# Patient Record
Sex: MALE | Race: BLACK OR AFRICAN AMERICAN | NOT HISPANIC OR LATINO | Employment: OTHER | URBAN - METROPOLITAN AREA
[De-identification: names, ages, dates, MRNs, and addresses within clinical notes are randomized per-mention and may not be internally consistent; named-entity substitution may affect disease eponyms.]

---

## 2017-06-22 ENCOUNTER — HOSPITAL ENCOUNTER (EMERGENCY)
Facility: HOSPITAL | Age: 47
Discharge: HOME | End: 2017-06-22
Attending: EMERGENCY MEDICINE

## 2017-06-22 VITALS
RESPIRATION RATE: 20 BRPM | BODY MASS INDEX: 22.53 KG/M2 | HEIGHT: 76 IN | HEART RATE: 83 BPM | DIASTOLIC BLOOD PRESSURE: 74 MMHG | SYSTOLIC BLOOD PRESSURE: 135 MMHG | OXYGEN SATURATION: 100 % | WEIGHT: 185 LBS | TEMPERATURE: 97 F

## 2017-06-22 DIAGNOSIS — R55 SYNCOPE: ICD-10-CM

## 2017-06-22 DIAGNOSIS — E87.1 HYPONATREMIA: Primary | ICD-10-CM

## 2017-06-22 DIAGNOSIS — R41.0 CONFUSION: ICD-10-CM

## 2017-06-22 LAB
ALBUMIN SERPL BCP-MCNC: 3.3 G/DL
ALP SERPL-CCNC: 98 U/L
ALT SERPL W/O P-5'-P-CCNC: 54 U/L
AMPHET+METHAMPHET UR QL: NEGATIVE
ANION GAP SERPL CALC-SCNC: 11 MMOL/L
AST SERPL-CCNC: 92 U/L
BARBITURATES UR QL SCN>200 NG/ML: NEGATIVE
BASOPHILS # BLD AUTO: 0 K/UL
BASOPHILS NFR BLD: 0.5 %
BENZODIAZ UR QL SCN>200 NG/ML: NEGATIVE
BILIRUB SERPL-MCNC: 0.8 MG/DL
BUN SERPL-MCNC: 2 MG/DL
BZE UR QL SCN: NEGATIVE
CALCIUM SERPL-MCNC: 8 MG/DL
CANNABINOIDS UR QL SCN: ABNORMAL
CHLORIDE SERPL-SCNC: 85 MMOL/L
CO2 SERPL-SCNC: 23 MMOL/L
CREAT SERPL-MCNC: 0.7 MG/DL
CREAT UR-MCNC: 20.4 MG/DL
D DIMER PPP IA.FEU-MCNC: 0.26 MG/L FEU
DIFFERENTIAL METHOD: ABNORMAL
EOSINOPHIL # BLD AUTO: 0 K/UL
EOSINOPHIL NFR BLD: 0.4 %
ERYTHROCYTE [DISTWIDTH] IN BLOOD BY AUTOMATED COUNT: 12.6 %
EST. GFR  (AFRICAN AMERICAN): >60 ML/MIN/1.73 M^2
EST. GFR  (NON AFRICAN AMERICAN): >60 ML/MIN/1.73 M^2
GLUCOSE SERPL-MCNC: 117 MG/DL
HCT VFR BLD AUTO: 34.1 %
HGB BLD-MCNC: 11.8 G/DL
LYMPHOCYTES # BLD AUTO: 0.7 K/UL
LYMPHOCYTES NFR BLD: 19.4 %
MAGNESIUM SERPL-MCNC: 1.5 MG/DL
MCH RBC QN AUTO: 35.8 PG
MCHC RBC AUTO-ENTMCNC: 34.7 %
MCV RBC AUTO: 103 FL
METHADONE UR QL SCN>300 NG/ML: NEGATIVE
MONOCYTES # BLD AUTO: 0.4 K/UL
MONOCYTES NFR BLD: 11.4 %
NEUTROPHILS # BLD AUTO: 2.6 K/UL
NEUTROPHILS NFR BLD: 68.3 %
OPIATES UR QL SCN: NEGATIVE
PCP UR QL SCN>25 NG/ML: NEGATIVE
PLATELET # BLD AUTO: 113 K/UL
PMV BLD AUTO: 7.4 FL
POTASSIUM SERPL-SCNC: 3.6 MMOL/L
PROT SERPL-MCNC: 5.7 G/DL
RBC # BLD AUTO: 3.31 M/UL
SODIUM SERPL-SCNC: 119 MMOL/L
SODIUM SERPL-SCNC: 122 MMOL/L
TOXICOLOGY INFORMATION: ABNORMAL
TROPONIN I SERPL DL<=0.01 NG/ML-MCNC: 0.03 NG/ML
WBC # BLD AUTO: 3.8 K/UL

## 2017-06-22 PROCEDURE — 80053 COMPREHEN METABOLIC PANEL: CPT

## 2017-06-22 PROCEDURE — 25000003 PHARM REV CODE 250: Performed by: EMERGENCY MEDICINE

## 2017-06-22 PROCEDURE — 84484 ASSAY OF TROPONIN QUANT: CPT

## 2017-06-22 PROCEDURE — 83735 ASSAY OF MAGNESIUM: CPT

## 2017-06-22 PROCEDURE — 99285 EMERGENCY DEPT VISIT HI MDM: CPT | Mod: 25

## 2017-06-22 PROCEDURE — 36415 COLL VENOUS BLD VENIPUNCTURE: CPT

## 2017-06-22 PROCEDURE — 96361 HYDRATE IV INFUSION ADD-ON: CPT

## 2017-06-22 PROCEDURE — 93005 ELECTROCARDIOGRAM TRACING: CPT

## 2017-06-22 PROCEDURE — 85025 COMPLETE CBC W/AUTO DIFF WBC: CPT

## 2017-06-22 PROCEDURE — 84295 ASSAY OF SERUM SODIUM: CPT

## 2017-06-22 PROCEDURE — 85379 FIBRIN DEGRADATION QUANT: CPT

## 2017-06-22 PROCEDURE — 80307 DRUG TEST PRSMV CHEM ANLYZR: CPT

## 2017-06-22 PROCEDURE — 96360 HYDRATION IV INFUSION INIT: CPT

## 2017-06-22 RX ORDER — SODIUM CHLORIDE 9 MG/ML
1000 INJECTION, SOLUTION INTRAVENOUS
Status: COMPLETED | OUTPATIENT
Start: 2017-06-22 | End: 2017-06-22

## 2017-06-22 RX ADMIN — SODIUM CHLORIDE 1000 ML: 0.9 INJECTION, SOLUTION INTRAVENOUS at 07:06

## 2017-06-22 RX ADMIN — SODIUM CHLORIDE 1000 ML: 0.9 INJECTION, SOLUTION INTRAVENOUS at 04:06

## 2017-06-22 NOTE — ED PROVIDER NOTES
Encounter Date: 6/22/2017    SCRIBE #1 NOTE: I Briasophia Guidry, am scribing for, and in the presence of, Dr. Hernandez.       History     Chief Complaint   Patient presents with    Loss of Consciousness     Found unconscious while in shower [place] the [employer].  Currently confused.  Hematoma to R forehead, bruising to right lower lip.       6/22/2017  4:04 PM     Chief Complaint: LOC    The patient is a 46 y.o. male who presents with loss of consciousness. EMS reports the patient was found unconscious while in the shower [place] the [employer]. Pt has swelling to the upper and lower lip. Patient reports he was cleaning and picking up towels before passing out. He is unaware why he passed out. He was feeling fine prior to the event. He denies any headache. He complains of constant, mild shortness of breath with palpations since passing out. No chest pain. Denies any recent fever, chills, abdominal pain, nausea, vomiting or diarrhea. Pt states he drunk some beer yesterday evening but denies any drug usage. No PMHx. No shx.      The history is provided by the patient and the EMS personnel.     Review of patient's allergies indicates:  Allergies not on file  No past medical history on file.  No past surgical history on file.  No family history on file.  Social History   Substance Use Topics    Smoking status: Not on file    Smokeless tobacco: Not on file    Alcohol use Not on file     Review of Systems   Constitutional: Negative for appetite change, chills and fever.   HENT: Negative for congestion, rhinorrhea and sore throat.         + Upper and lower lip swelling.   Respiratory: Positive for shortness of breath. Negative for cough.    Cardiovascular: Positive for palpitations. Negative for chest pain.   Gastrointestinal: Negative for abdominal pain, diarrhea, nausea and vomiting.   Genitourinary: Negative for dysuria.   Musculoskeletal: Negative for back pain and myalgias.   Skin: Negative for rash.   Neurological:  Positive for syncope. Negative for weakness, numbness and headaches.   Hematological: Does not bruise/bleed easily.   All other systems reviewed and are negative.      Physical Exam     Initial Vitals   BP Pulse Resp Temp SpO2   -- -- -- -- --      MAP       --         Physical Exam    Nursing note and vitals reviewed.  Constitutional: He appears well-developed and well-nourished. No distress.   HENT:   Head: Normocephalic and atraumatic.   Mouth/Throat: Uvula is midline, oropharynx is clear and moist and mucous membranes are normal. No lacerations.   Swelling to the upper and lower lip. No evidence of dental injury.   Eyes: EOM are normal. Pupils are equal, round, and reactive to light.   Neck: Normal range of motion.   Cardiovascular: Normal rate, regular rhythm, normal heart sounds and intact distal pulses. Exam reveals no gallop and no friction rub.    No murmur heard.  Pulmonary/Chest: Breath sounds normal. He has no wheezes. He has no rhonchi. He has no rales.   Abdominal: Soft. He exhibits no distension. There is no tenderness.   Musculoskeletal: Normal range of motion. He exhibits no edema.   Neurological: He is alert and oriented to person, place, and time.   Skin: Skin is dry. No rash noted.   Psychiatric: He has a normal mood and affect.         ED Course   Procedures  Labs Reviewed - No data to display  EKG Readings: (Independently Interpreted)   Rhythm: Normal Sinus Rhythm. Heart Rate: 83. Ectopy: No Ectopy. Conduction: Normal. ST Segments: Normal ST Segments. T Waves: Normal. Axis: Normal. Clinical Impression: Normal Sinus Rhythm          Medical Decision Making:   Clinical Tests:   Lab Tests: Ordered and Reviewed  The following lab test(s) were unremarkable: CBC, CMP, Troponin and D-Dimer  ED Management:  Josh Coulter is a 46 y.o. male who presents with  syncopal episode with contusion to the lip.  Circumstances leading up to the loss of consciousness remain unclear.  He initially denies any symptoms  prior to syncopal episode; however, prior to discharge she reports that he has had a 2 day history of nausea vomiting and diarrhea.  He also states he was working in very warm conditions making hyponatremia most likely hypovolemic hyponatremia.  The severity of the hyponatremia warrants admission.  He is strongly encouraged to seek admission for hydration with serial sodiums and evaluation of the etiology.  He refuses admission and understands that worsening of his hyponatremia may result in  seizures, brain damage or even death.            Scribe Attestation:   Scribe #1: I performed the above scribed service and the documentation accurately describes the services I performed. I attest to the accuracy of the note.    Attending Attestation:           Physician Attestation for Scribe:  Physician Attestation Statement for Scribe #1: I, Dr. Hernandez, reviewed documentation, as scribed by Brai Guidry in my presence, and it is both accurate and complete.                 ED Course     Clinical Impression:   The primary encounter diagnosis was Hyponatremia. Diagnoses of Syncope and Confusion were also pertinent to this visit.                           Guerrero Hernandez III, MD  06/22/17 9740

## 2019-11-22 ENCOUNTER — OFFICE VISIT (OUTPATIENT)
Dept: FAMILY MEDICINE | Facility: CLINIC | Age: 49
End: 2019-11-22
Payer: MEDICAID

## 2019-11-22 VITALS
OXYGEN SATURATION: 99 % | HEIGHT: 76 IN | TEMPERATURE: 98 F | DIASTOLIC BLOOD PRESSURE: 82 MMHG | WEIGHT: 163.19 LBS | HEART RATE: 103 BPM | SYSTOLIC BLOOD PRESSURE: 142 MMHG | BODY MASS INDEX: 19.87 KG/M2

## 2019-11-22 DIAGNOSIS — Z86.2 HISTORY OF ANEMIA: ICD-10-CM

## 2019-11-22 DIAGNOSIS — N52.9 ERECTILE DYSFUNCTION, UNSPECIFIED ERECTILE DYSFUNCTION TYPE: ICD-10-CM

## 2019-11-22 DIAGNOSIS — Z23 NEED FOR INFLUENZA VACCINATION: ICD-10-CM

## 2019-11-22 DIAGNOSIS — M43.16 SPONDYLOLISTHESIS OF LUMBAR REGION: Primary | ICD-10-CM

## 2019-11-22 DIAGNOSIS — Z23 NEED FOR TDAP VACCINATION: ICD-10-CM

## 2019-11-22 DIAGNOSIS — Z13.220 ENCOUNTER FOR LIPID SCREENING FOR CARDIOVASCULAR DISEASE: ICD-10-CM

## 2019-11-22 DIAGNOSIS — Z13.6 ENCOUNTER FOR LIPID SCREENING FOR CARDIOVASCULAR DISEASE: ICD-10-CM

## 2019-11-22 DIAGNOSIS — R79.89 ABNORMAL LFTS: ICD-10-CM

## 2019-11-22 DIAGNOSIS — Z12.11 ENCOUNTER FOR SCREENING COLONOSCOPY: ICD-10-CM

## 2019-11-22 DIAGNOSIS — H61.20 IMPACTED CERUMEN, UNSPECIFIED LATERALITY: ICD-10-CM

## 2019-11-22 PROCEDURE — 90472 IMMUNIZATION ADMIN EACH ADD: CPT | Mod: PBBFAC | Performed by: NURSE PRACTITIONER

## 2019-11-22 PROCEDURE — 99205 OFFICE O/P NEW HI 60 MIN: CPT | Performed by: NURSE PRACTITIONER

## 2019-11-22 PROCEDURE — 99203 PR OFFICE/OUTPT VISIT, NEW, LEVL III, 30-44 MIN: ICD-10-PCS | Mod: S$PBB,,, | Performed by: NURSE PRACTITIONER

## 2019-11-22 PROCEDURE — 99203 OFFICE O/P NEW LOW 30 MIN: CPT | Mod: S$PBB,,, | Performed by: NURSE PRACTITIONER

## 2019-11-22 PROCEDURE — 90715 TDAP VACCINE 7 YRS/> IM: CPT | Mod: PBBFAC | Performed by: NURSE PRACTITIONER

## 2019-11-22 PROCEDURE — 90471 IMMUNIZATION ADMIN: CPT | Mod: PBBFAC | Performed by: NURSE PRACTITIONER

## 2019-11-22 RX ORDER — SILDENAFIL CITRATE 20 MG/1
20 TABLET ORAL DAILY PRN
Qty: 30 TABLET | Refills: 1 | Status: SHIPPED | OUTPATIENT
Start: 2019-11-22 | End: 2021-03-22 | Stop reason: DRUGHIGH

## 2019-11-22 RX ORDER — CYCLOBENZAPRINE HCL 5 MG
10 TABLET ORAL 3 TIMES DAILY PRN
Qty: 30 TABLET | Refills: 0 | Status: SHIPPED | OUTPATIENT
Start: 2019-11-22 | End: 2020-05-12 | Stop reason: SDUPTHER
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
## 2019-12-04 ENCOUNTER — OFFICE VISIT (OUTPATIENT)
Dept: ORTHOPEDICS | Facility: CLINIC | Age: 49
End: 2019-12-04
Payer: MEDICARE

## 2019-12-04 VITALS — BODY MASS INDEX: 20.08 KG/M2 | DIASTOLIC BLOOD PRESSURE: 90 MMHG | WEIGHT: 165 LBS | SYSTOLIC BLOOD PRESSURE: 140 MMHG

## 2019-12-04 DIAGNOSIS — M43.16 SPONDYLOLISTHESIS AT L4-L5 LEVEL: ICD-10-CM

## 2019-12-04 DIAGNOSIS — Z98.1 HISTORY OF LUMBAR FUSION: ICD-10-CM

## 2019-12-04 PROCEDURE — 99203 OFFICE O/P NEW LOW 30 MIN: CPT | Mod: 25,S$GLB,, | Performed by: ORTHOPAEDIC SURGERY

## 2019-12-04 PROCEDURE — 99203 PR OFFICE/OUTPT VISIT, NEW, LEVL III, 30-44 MIN: ICD-10-PCS | Mod: 25,S$GLB,, | Performed by: ORTHOPAEDIC SURGERY

## 2019-12-04 RX ORDER — HYDROCODONE BITARTRATE AND ACETAMINOPHEN 7.5; 325 MG/1; MG/1
1 TABLET ORAL EVERY 6 HOURS PRN
Qty: 28 TABLET | Refills: 0 | Status: SHIPPED | OUTPATIENT
Start: 2019-12-04 | End: 2019-12-11

## 2019-12-04 NOTE — PROGRESS NOTES
Subjective:       Patient ID: Josh Coulter is a 49 y.o. male.    Chief Complaint: Pain of the Lumbar Spine (Lumbar pain x 9 years. He had surgery 2010. Pain radiates down both legs to feet with numbness, tingling and burning. No other treatment)      History of Present Illness  This man had lumbar surgery in 2010 and was improved or not pain free until 2017 and 2017 he fell and had a worsening of his symptoms he saw a doctor who told him that he damaged the disc both above and below his previous fusion.  Fairly previous had a fusion in complaints now of constant pain that radiates down both legs and feet on a daily basis worse with activity.  No bowel or bladder incontinence.  His right leg bothers him more than the left leg.    Current Medications  Current Outpatient Medications   Medication Sig Dispense Refill    cyclobenzaprine (FLEXERIL) 5 MG tablet Take 2 tablets (10 mg total) by mouth 3 (three) times daily as needed for Muscle spasms. 30 tablet 0    sildenafil (REVATIO) 20 mg Tab Take 1 tablet (20 mg total) by mouth daily as needed (erectile dysfunction). 30 tablet 1    COMPRESSION SOCKS, LARGE MISC Please use to help with your leg swelling.       No current facility-administered medications for this visit.        Allergies  Review of patient's allergies indicates:  No Known Allergies    Past Medical History  History reviewed. No pertinent past medical history.    Surgical History  Past Surgical History:   Procedure Laterality Date    HERNIA REPAIR      SPINE SURGERY  2009       Family History:   Family History   Problem Relation Age of Onset    Hyperlipidemia Mother     Hypertension Mother     Hyperlipidemia Father     Hypertension Father        Social History:   Social History     Socioeconomic History    Marital status:      Spouse name: Not on file    Number of children: 3    Years of education: Not on file    Highest education level: Not on file   Occupational History    Occupation:  unemployed   Social Needs    Financial resource strain: Not on file    Food insecurity:     Worry: Not on file     Inability: Not on file    Transportation needs:     Medical: Not on file     Non-medical: Not on file   Tobacco Use    Smoking status: Current Every Day Smoker     Packs/day: 1.00     Years: 19.00     Pack years: 19.00     Types: Cigarettes    Smokeless tobacco: Never Used   Substance and Sexual Activity    Alcohol use: Yes     Alcohol/week: 14.0 standard drinks     Types: 14 Cans of beer per week     Comment: 2 beers per night    Drug use: Yes     Types: Marijuana    Sexual activity: Yes     Partners: Female   Lifestyle    Physical activity:     Days per week: Not on file     Minutes per session: Not on file    Stress: Very much   Relationships    Social connections:     Talks on phone: Not on file     Gets together: Not on file     Attends Anabaptism service: Not on file     Active member of club or organization: Not on file     Attends meetings of clubs or organizations: Not on file     Relationship status: Not on file   Other Topics Concern    Not on file   Social History Narrative    Not on file       Hospitalization/Major Diagnostic Procedure:     Review of Systems     General/Constitutional:  Chills denies. Fatigue denies. Fever denies. Weight gain denies. Weight loss denies.    Respiratory:  Shortness of breath denies.    Cardiovascular:  Chest pain denies.    Gastrointestinal:  Constipation denies. Diarrhea denies. Nausea denies. Vomiting denies.     Hematology:  Easy bruising denies. Prolonged bleeding denies.     Genitourinary:  Frequent urination denies. Pain in lower back denies. Painful urination denies.     Musculoskeletal:  See HPI for details    Skin:  Rash denies.    Neurologic:  Dizziness denies. Gait abnormalities denies. Seizures denies. Tingling/Numbess denies.    Psychiatric:  Anxiety denies. Depressed mood denies.     Objective:   Vital Signs:   Vitals:    12/04/19  1330   BP: (!) 140/90        Physical Exam      General Examination:     Constitutional: The patient is alert and oriented to lace person and time. Mood is pleasant.     Head/Face: Normal facial features normal eyebrows    Eyes: Normal extraocular motion bilaterally    Lungs: Respirations are equal and unlabored    Gait is coordinated.    Cardiovascular: There are no swelling or varicosities present.    Lymphatic: Negative for adenopathy    Skin: Normal    Neurological: Level of consciousness normal. Oriented to place person and time and situation    Psychiatric: Oriented to time place person and situation    Lumbar exam shows well-healed lumbar incision L3-S1 bilateral spinous muscle spasm no active extension marked room restriction of flexion and bending of the trunk.  Patient unable to stand erect Straight leg raising positive right side patellar Achilles reflexes symmetrical the knee range of motion normal subjective numbness in the  Nerve root distribution.  No edema adenopathy or varicosities noted.  XRAY Report/ Interpretation :  AP pelvis x-ray was taken today. Indications low back pain and/or hip pain. Findings AP pelvis x-ray appears to be normal with no evidence of fractures or significant degenerative disease AP and lateral x-rays of lumbar spine will performed today. Indications low back pain. Findings:  AP and flexion-extension lateral x-rays of the lumbar spine were taken.  There is an instrumented lumbar fusion at L5-S1.  Flexion-extension lateral x-rays showed marked narrowing and spondylolisthesis at L4-5 flexion-extension view shows some translational movement at that level      Assessment:       1. Adjacent segment disease with spinal stenosis    2. History of lumbar fusion    3. Spondylolisthesis at L4-L5 level        Plan:       Josh was seen today for pain.    Diagnoses and all orders for this visit:    Adjacent segment disease with spinal stenosis  -     X-Ray Lumbar Spine Flexion And  Extension Only    History of lumbar fusion  -     X-Ray Lumbar Spine Ap And Lateral  -     X-Ray Pelvis Routine AP  -     X-Ray Lumbar Spine Flexion And Extension Only    Spondylolisthesis at L4-L5 level         No follow-ups on file.    Treatment options were discussed regards to the nature of the spinal condition conservative pain interventional and surgical options were discussed in detail and the probability of success of the separate treatment options was discussed in detail the patient expressed a clear understanding of the treatment options would regards to surgery the procedure risks benefits complications and outcomes were discussed.  No guarantees were given regards to surgical outcome.  Lumbar MRI with and without gadolinium advised at this time    This note was created using Dragon voice recognition software that occasionally misinterpreted phrases or words.

## 2019-12-16 ENCOUNTER — TELEPHONE (OUTPATIENT)
Dept: ORTHOPEDICS | Facility: CLINIC | Age: 49
End: 2019-12-16

## 2019-12-16 DIAGNOSIS — Z98.1 HISTORY OF LUMBAR FUSION: ICD-10-CM

## 2019-12-16 DIAGNOSIS — M43.16 SPONDYLOLISTHESIS AT L4-L5 LEVEL: ICD-10-CM

## 2019-12-16 NOTE — TELEPHONE ENCOUNTER
----- Message from Zaria Gonsalez sent at 12/16/2019 11:39 AM CST -----  Patients Lumbar MRI was denied.  He wants to be set up for 6w of PT.

## 2019-12-20 ENCOUNTER — CLINICAL SUPPORT (OUTPATIENT)
Dept: REHABILITATION | Facility: HOSPITAL | Age: 49
End: 2019-12-20
Attending: ORTHOPAEDIC SURGERY
Payer: MEDICARE

## 2019-12-20 DIAGNOSIS — Z98.1 HISTORY OF LUMBAR FUSION: ICD-10-CM

## 2019-12-20 DIAGNOSIS — R26.9 GAIT ABNORMALITY: ICD-10-CM

## 2019-12-20 PROCEDURE — 97161 PT EVAL LOW COMPLEX 20 MIN: CPT | Mod: PN

## 2019-12-20 RX ORDER — HYDROCODONE BITARTRATE AND ACETAMINOPHEN 7.5; 325 MG/1; MG/1
1 TABLET ORAL EVERY 6 HOURS PRN
Qty: 28 TABLET | Refills: 0 | Status: SHIPPED | OUTPATIENT
Start: 2019-12-20 | End: 2019-12-27

## 2019-12-20 NOTE — TELEPHONE ENCOUNTER
----- Message from Radha Rendon sent at 12/19/2019 10:27 AM CST -----  Contact: patient  Patient was calling to get a refill on pain medication Hydrocodone 7.5-325 mg, about to start therapy, call patient back at [phone].

## 2019-12-20 NOTE — PLAN OF CARE
"[facility]  Physical Therapy Initial Evaluation    Name: Josh Coulter  Clinic Number: [id number]    Therapy Diagnosis:   Encounter Diagnosis   Name Primary?    Gait abnormality      Physician: Cholo Mena MD    Physician Orders: PT Eval and Treat   Medical Diagnosis from Referral:   M48.00 (ICD-10-CM) - Adjacent segment disease with spinal stenosis   Z98.1 (ICD-10-CM) - History of lumbar fusion   M43.16 (ICD-10-CM) - Spondylolisthesis at L4-L5 level       Evaluation Date: 12/20/2019  Authorization Period Expiration: 12/15/2020  Plan of Care Expiration: 2/14/2020  Visit # / Visits authorized: 1/ 1    Time In: 1410  Time Out: 1500  Total Billable Time: 50 minutes    Precautions: Falls, standard    Subjective   Date of onset: 2010  History of current condition - Josh reports:  Lumbar surgery with fusion in 2010. Reports a fall and worsening in 2017 and was told he had "damaged," his discs above and below the fusion. Now complaint of constant pain and radiation down both legs to the feet. He reports that when he fell because he couldn't feel his legs getting out of bed, ended up in a w/c and needed help even bathing. Eventually started walking again but continues to have pain and weakness. Patient seen by Dr. Mena who recommended MRI. Patient reports insurance will not pay for MRI until he completes outpatient physical therapy. Reports previous PT treatment with no improvement and that the exercises worsened his symptoms.      Medical History:   No past medical history on file.    Surgical History:   Josh Coulter  has a past surgical history that includes Spine surgery (2009) and Hernia repair. (6 months before spine surgery)    Medications:   Josh has a current medication list which includes the following prescription(s): compression socks, large, cyclobenzaprine, hydrocodone-acetaminophen, and sildenafil.    Allergies:   Review of patient's allergies indicates:  No Known " "Allergies     Imaging, 12/4/2019 lumbar xray XRAY Report/ Interpretation :  AP pelvis x-ray was taken today. Indications low back pain and/or hip pain. Findings AP pelvis x-ray appears to be normal with no evidence of fractures or significant degenerative disease AP and lateral x-rays of lumbar spine will performed today. Indications low back pain. Findings:  AP and flexion-extension lateral x-rays of the lumbar spine were taken.  There is an instrumented lumbar fusion at L5-S1.  Flexion-extension lateral x-rays showed marked narrowing and spondylolisthesis at L4-5 flexion-extension view shows some translational movement at that level    Prior Therapy: yes  Social History: lives in 2 story home with bedroom on 1st floor, 3 TILA with no rail.   Occupation: not working for past 2 years; previously built houses for living  Prior Level of Function: independent prior to 6  Current Level of Function: Ambulatory with AD; drives    Pain:    Location:  Low back and lateral sides of hips, down bilateral "whole," legs to plantar feet  Description: shooting, muscles, "snakes,"  Aggravating Factors: standing, walking, bending, lifting, holding hands above head  Easing Factors: recliner, heat  Current 5/10, worst 9/10, best 9/10     Pts goals: "Get me ready for surgery."    Objective     Posture/Appearance: in standing with fwd trunk flexion, rounded shoulders and fwd head position  Palpation: TTP lumbar paraspinals  Sensation: Reports subjective numbness in B LE's      ROM/Strength:       Lumbar AROM: Pain/Dysfunction with Movement:   Flexion 14*    Extension Unable to stand erect    Right side bending 75%    Left side bending 75%    *patient unable to stand erect. Measurements taken with patient supporting himself with mat due to balance impairment    Lower Extremity Range of Motion:  Right Lower Extremity: guarded  Left Lower Extremity: guarded    Lower Extremity Strength:  Right Lower Extremity: 3/5 to 4-/5, some giving way "   Left Lower Extremity: 3/5 to 4-/5, some giving way      Transfers: mod I  Gait: antalgic, slow taniya  Special tests: SLR + L/R for increased low back pain, (-) for radiating symptoms, almita's + L/R  Functional Outcome Measure: The Revised Oswestry Low Back Pain Questionnaire: 42/50=84% impairment      TREATMENT     Education provided:   - Role/goal PT; POC  - Discussed monitoring symptoms and stopping activities which cause increased pain  - Unable to issue HEP this date secondary to decreased tolerance to attempted therex      Assessment   Josh is a 49 y.o. male referred to outpatient Physical Therapy with a medical diagnosis of   M48.00 (ICD-10-CM) - Adjacent segment disease with spinal stenosis   Z98.1 (ICD-10-CM) - History of lumbar fusion   M43.16 (ICD-10-CM) - Spondylolisthesis at L4-L5 level     . Pt presents with gait abnormality, decreased ROM, weakness, and decreased functional mobility.    Pt prognosis is Fair.   Pt will benefit from skilled outpatient Physical Therapy to address the deficits stated above and in the chart below, provide pt/family education, and to maximize pt's level of independence.     Plan of care discussed with patient: Yes  Pt's spiritual, cultural and educational needs considered and patient is agreeable to the plan of care and goals as stated below:     Anticipated Barriers for therapy: pain, attitude     Medical Necessity is demonstrated by the following  History  Co-morbidities and personal factors that may impact the plan of care Co-morbidities:   prior lumbar surgery    Personal Factors:   attitudes     low   Examination  Body Structures and Functions, activity limitations and participation restrictions that may impact the plan of care Body Regions:   back  lower extremities    Body Systems:    ROM  strength  balance  gait    Participation Restrictions:       Activity limitations:   Learning and applying knowledge  no deficits    General Tasks and Commands  no  deficits    Communication  no deficits    Mobility  walking    Self care  washing oneself (bathing, drying, washing hands)  dressing    Domestic Life      Interactions/Relationships  no deficits    Life Areas  no deficits    Community and Social Life  community life  recreation and leisure         low   Clinical Presentation stable and uncomplicated low   Decision Making/ Complexity Score: low     Goals:  Short Term Goals: 3 weeks   1) Patient will initiate HEP  2) Patient will improve LE strength 1/2 muscle grade    Long Term Goals: 6 weeks   1) Patient will be independent in HEP  2) Patient will return to I/ADL's with pain <6/10 and strength 4/5 or >  3) Patient will improve functional outcome measure Oswestry to <70% impairment    Plan   Plan of care Certification: 12/20/2019 to 2/14/2020.    Outpatient Physical Therapy 2 times weekly for 6 weeks to include the following interventions: Electrical Stimulation PRN, Gait Training, Manual Therapy, Moist Heat/ Ice, Patient Education, Therapeutic Activites and Therapeutic Exercise.     Genoveva Hughes, PT

## 2019-12-20 NOTE — TELEPHONE ENCOUNTER
----- Message from Hedy Porter sent at 12/20/2019 10:40 AM CST -----  Contact: Patient [phone]  Patient calling back checking on his pain medication Hydrocodone. Dr Mena

## 2019-12-31 ENCOUNTER — CLINICAL SUPPORT (OUTPATIENT)
Dept: REHABILITATION | Facility: HOSPITAL | Age: 49
End: 2019-12-31
Attending: ORTHOPAEDIC SURGERY
Payer: MEDICARE

## 2019-12-31 DIAGNOSIS — R26.9 GAIT ABNORMALITY: ICD-10-CM

## 2019-12-31 PROCEDURE — 97110 THERAPEUTIC EXERCISES: CPT | Mod: PN

## 2019-12-31 PROCEDURE — 97116 GAIT TRAINING THERAPY: CPT | Mod: PN

## 2019-12-31 NOTE — PROGRESS NOTES
"  Physical Therapy Daily Treatment Note     Name: Josh Coulter  Clinic Number: [id number]    Therapy Diagnosis:   Encounter Diagnosis   Name Primary?    Gait abnormality      Physician: Cholo Mena MD    Visit Date: 12/31/2019    Physician Orders: PT Eval and Treat   Medical Diagnosis from Referral:   M48.00 (ICD-10-CM) - Adjacent segment disease with spinal stenosis   Z98.1 (ICD-10-CM) - History of lumbar fusion   M43.16 (ICD-10-CM) - Spondylolisthesis at L4-L5 level     Evaluation Date: 12/20/2019  Authorization Period Expiration: 12/15/2020  Plan of Care Expiration: 2/14/2020  Visit # / Visits authorized:       Time In: 1310  Time Out: 1355  Total Billable Time: 45 minutes    Precautions: Fall, 2 person assist for ambulation safety    Subjective     Pt reports: "I just want my surgery after we are done with therapy", reports walking with SC in R hand for 2 years and has difficulty with cane in L hand.  He was given  today  home exercise program.  Response to previous treatment: Increased pain  Functional change: No changes    Pain: 10/10  Location: bilateral low back and R LE      Objective     Josh received therapeutic exercises to develop flexibility and core stabilization for 20 minutes including:    Sit<>Supine CGA with VC/TC for log roll  Stand>sit withCGA and VC for safety  PPT 2x10  LTR x10 R/L  Hip flexion supine with knees in flexion and feet on mat x10 R/L    Josh participated in gait training to improve functional mobility and safety for 25  minutes, including:    Gait training with SC 3x30' CGA > min A assist with sequencing and education for proper hand placement.  Chair was retrieved 2nd to attempt to lean forward on low mat in what appeared to be LOB, patient was assisted to upright with min A after mod A to restrict fwd lean, chair was brought to patient and he sat down safely.    Home Exercises Provided and Patient Education Provided     Education provided:   - Log roll with bed mobility, " Stand> sit safety, gait training with SC in L hand    Written Home Exercises Provided: yes.  Exercises were reviewed and Josh was able to demonstrate them prior to the end of the session.  Josh demonstrated fair  understanding of the education provided.     See EMR under Media for exercises provided 12/31/2019.    Assessment     Patient had difficulty with tolerating exercises and following cues for proper form without multiple cueing, multiple balance checks with ambulation would be safer to have 2nd person for safety.    Pt will continue to benefit from skilled outpatient physical therapy to address the deficits listed in the problem list box on initial evaluation, provide pt/family education and to maximize pt's level of independence in the home and community environment.     Pt's spiritual, cultural and educational needs considered and pt agreeable to plan of care and goals.     Goals: Progressing towards    Plan     Continue with POC to increase activities as patient tolerates.    Tea Mares, PTA

## 2020-01-03 ENCOUNTER — CLINICAL SUPPORT (OUTPATIENT)
Dept: REHABILITATION | Facility: HOSPITAL | Age: 50
End: 2020-01-03
Attending: ORTHOPAEDIC SURGERY
Payer: MEDICARE

## 2020-01-03 DIAGNOSIS — M43.16 SPONDYLOLISTHESIS AT L4-L5 LEVEL: ICD-10-CM

## 2020-01-03 DIAGNOSIS — R26.9 GAIT ABNORMALITY: ICD-10-CM

## 2020-01-03 DIAGNOSIS — Z98.1 HISTORY OF LUMBAR FUSION: ICD-10-CM

## 2020-01-03 PROCEDURE — 97110 THERAPEUTIC EXERCISES: CPT | Mod: PN

## 2020-01-03 RX ORDER — HYDROCODONE BITARTRATE AND ACETAMINOPHEN 7.5; 325 MG/1; MG/1
1 TABLET ORAL EVERY 12 HOURS PRN
Qty: 14 TABLET | Refills: 0 | Status: SHIPPED | OUTPATIENT
Start: 2020-01-03 | End: 2020-01-10

## 2020-01-03 NOTE — TELEPHONE ENCOUNTER
----- Message from Vivian Crystal sent at 1/3/2020 11:27 AM CST -----  Contact: Pt   Pt states that he called this AM for a Rx refill. He also states he will be coming by after PT to see if it will be ready for p/u. Pt call back # [phone].

## 2020-01-03 NOTE — TELEPHONE ENCOUNTER
----- Message from Hedy Porter sent at 1/3/2020  9:02 AM CST -----  Contact: Haley [phone]  Requesting refill on his Hydrocodone 7.5. Dr Mena

## 2020-01-03 NOTE — PROGRESS NOTES
"  Physical Therapy Daily Treatment Note     Time In: 1308  Time Out: 1351  Total Billable Time: 43 minutes    Name: Josh Coulter  Clinic Number: [id number]    Therapy Diagnosis:   Encounter Diagnosis   Name Primary?    Gait abnormality      Physician: Cholo Mena MD    Visit Date: 1/3/2020    Physician Orders: PT Eval and Treat   Medical Diagnosis from Referral:   M48.00 (ICD-10-CM) - Adjacent segment disease with spinal stenosis   Z98.1 (ICD-10-CM) - History of lumbar fusion   M43.16 (ICD-10-CM) - Spondylolisthesis at L4-L5 level      Evaluation Date: 12/20/2019  Authorization Period Expiration: 12/15/2020  Plan of Care Expiration: 2/14/2020  Visit # / Visits authorized:       Precautions: Fall, 2 person assist for ambulation safety    Subjective     Pt reports: "I'm so glad I'm finally being taken care of," in regards to his upcoming surgery. increased pain/soreness for 2-3 days after last treatment session. Reports he has trouble remembering to use the cane in his left hand, since he has been using it in his right hand for years.  He was compliant with home exercise program.  Response to previous treatment: increased pain/soreness for 2-3 days  Functional change: none    Pain: 9/10  Location:  Low back     Objective     Josh received therapeutic exercises to develop strength, endurance, ROM, flexibility and core stabilization for 18 minutes including:    PPT 2 x 10 c/ 5 sec holds  LTR's 2 x 10 L/R (gentle, small ROM)  Hooklying hip adduction (gentle)  ball squeezes 2 x 10 c/ 5 sec holds  Hooklying TA sets c/ PB x 10 c/ 5 sec holds    Josh participated in gait training to improve functional mobility and safety for 10 minutes: CGA>min A with SPC, cues for sequencing and pace    Josh received the following supervised modalities after being cleared for contradictions: IFC Electrical Stimulation:  Josh received IFC Electrical Stimulation for pain control applied to the low back. Pt received stimulation x 15 " minutes with moist hot pack in prone position. Josh tolderated treatment well without any adverse effects.        Education provided:     - Discussed benefits of core strengthening for pain reduction and mobility  - Continue with current home exercise program as instructed  - Discussed monitoring symptoms and stopping activities which cause increased pain    Written Home Exercises Provided: Patient instructed to cont prior HEP.  Exercises were reviewed and Josh was able to demonstrate them prior to the end of the session.  Josh demonstrated good  understanding of the education provided.     See EMR under Media for exercises provided 12/20/2019.    Assessment     Patient with significant gait deviations, difficulty ambulating with SPC in L hand, fwd trunk flexion and fwd head position with decreased floor clearance bilaterally, frequently dragging RLE. Generally ambulating CGA, however min A standing up from low mat and ambulating short distance to high mat. SBA for supine<>sit with increased time. Limited tolerance for exercises, although demonstrating good technique for PPT's and LTR's initiated last visit. Shaking in LE's while performing gentle ball squeezes. Patient with decreased pain to 6-7/10 following MHP and IFC application.     Josh is progressing well towards his goals.   Pt prognosis is Fair.     Pt will continue to benefit from skilled outpatient physical therapy to address the deficits listed in the problem list box on initial evaluation, provide pt/family education and to maximize pt's level of independence in the home and community environment.     Pt's spiritual, cultural and educational needs considered and pt agreeable to plan of care and goals.     Anticipated barriers to physical therapy: pain    Goals:     Goals:  Short Term Goals: 3 weeks   1) Patient will initiate HEP  2) Patient will improve LE strength 1/2 muscle grade     Long Term Goals: 6 weeks   1) Patient will be independent in  HEP  2) Patient will return to I/ADL's with pain <6/10 and strength 4/5 or >  3) Patient will improve functional outcome measure Oswestry to <70% impairment    Short Term Goal Status:  1) Met  2) Not met      Long Term Goal Status:  1) Met  2) Not met  3) Not met      Plan     Continue with established Plan of Care towards PT goals.

## 2020-01-07 ENCOUNTER — TELEPHONE (OUTPATIENT)
Dept: FAMILY MEDICINE | Facility: CLINIC | Age: 50
End: 2020-01-07

## 2020-01-07 NOTE — TELEPHONE ENCOUNTER
----- Message from JORGE Booth sent at 1/7/2020 10:59 AM CST -----  Please call pt and remind him to do labs prior to appointment tomorrow.

## 2020-01-08 ENCOUNTER — LAB VISIT (OUTPATIENT)
Dept: LAB | Facility: HOSPITAL | Age: 50
End: 2020-01-08
Attending: NURSE PRACTITIONER
Payer: MEDICAID

## 2020-01-08 ENCOUNTER — OFFICE VISIT (OUTPATIENT)
Dept: FAMILY MEDICINE | Facility: CLINIC | Age: 50
End: 2020-01-08
Payer: MEDICARE

## 2020-01-08 VITALS
OXYGEN SATURATION: 99 % | DIASTOLIC BLOOD PRESSURE: 88 MMHG | HEIGHT: 76 IN | BODY MASS INDEX: 19.95 KG/M2 | TEMPERATURE: 98 F | HEART RATE: 108 BPM | WEIGHT: 163.81 LBS | SYSTOLIC BLOOD PRESSURE: 120 MMHG

## 2020-01-08 DIAGNOSIS — Z13.220 ENCOUNTER FOR LIPID SCREENING FOR CARDIOVASCULAR DISEASE: ICD-10-CM

## 2020-01-08 DIAGNOSIS — M43.16 SPONDYLOLISTHESIS OF LUMBAR REGION: Primary | ICD-10-CM

## 2020-01-08 DIAGNOSIS — Z86.2 HISTORY OF ANEMIA: ICD-10-CM

## 2020-01-08 DIAGNOSIS — R79.89 ABNORMAL LFTS: ICD-10-CM

## 2020-01-08 DIAGNOSIS — Z13.6 ENCOUNTER FOR LIPID SCREENING FOR CARDIOVASCULAR DISEASE: ICD-10-CM

## 2020-01-08 LAB
ALBUMIN SERPL BCP-MCNC: 4.3 G/DL (ref 3.5–5.2)
ALP SERPL-CCNC: 92 U/L (ref 55–135)
ALT SERPL W/O P-5'-P-CCNC: 68 U/L (ref 10–44)
ANION GAP SERPL CALC-SCNC: 11 MMOL/L (ref 8–16)
AST SERPL-CCNC: 120 U/L (ref 10–40)
BASOPHILS # BLD AUTO: 0.04 K/UL (ref 0–0.2)
BASOPHILS NFR BLD: 0.8 % (ref 0–1.9)
BILIRUB SERPL-MCNC: 0.9 MG/DL (ref 0.1–1)
BUN SERPL-MCNC: <5 MG/DL (ref 6–20)
CALCIUM SERPL-MCNC: 8.7 MG/DL (ref 8.7–10.5)
CHLORIDE SERPL-SCNC: 84 MMOL/L (ref 95–110)
CHOLEST SERPL-MCNC: 167 MG/DL (ref 120–199)
CHOLEST/HDLC SERPL: 1.3 {RATIO} (ref 2–5)
CO2 SERPL-SCNC: 26 MMOL/L (ref 23–29)
CREAT SERPL-MCNC: 0.8 MG/DL (ref 0.5–1.4)
DIFFERENTIAL METHOD: ABNORMAL
EOSINOPHIL # BLD AUTO: 0.1 K/UL (ref 0–0.5)
EOSINOPHIL NFR BLD: 1.1 % (ref 0–8)
ERYTHROCYTE [DISTWIDTH] IN BLOOD BY AUTOMATED COUNT: 12.7 % (ref 11.5–14.5)
EST. GFR  (AFRICAN AMERICAN): >60 ML/MIN/1.73 M^2
EST. GFR  (NON AFRICAN AMERICAN): >60 ML/MIN/1.73 M^2
GLUCOSE SERPL-MCNC: 77 MG/DL (ref 70–110)
HCT VFR BLD AUTO: 38.6 % (ref 40–54)
HDLC SERPL-MCNC: 133 MG/DL (ref 40–75)
HDLC SERPL: 79.6 % (ref 20–50)
HGB BLD-MCNC: 13.7 G/DL (ref 14–18)
IMM GRANULOCYTES # BLD AUTO: 0.02 K/UL (ref 0–0.04)
IMM GRANULOCYTES NFR BLD AUTO: 0.4 % (ref 0–0.5)
LDLC SERPL CALC-MCNC: 22.6 MG/DL (ref 63–159)
LYMPHOCYTES # BLD AUTO: 1.9 K/UL (ref 1–4.8)
LYMPHOCYTES NFR BLD: 36.6 % (ref 18–48)
MCH RBC QN AUTO: 33.7 PG (ref 27–31)
MCHC RBC AUTO-ENTMCNC: 35.5 G/DL (ref 32–36)
MCV RBC AUTO: 95 FL (ref 82–98)
MONOCYTES # BLD AUTO: 0.6 K/UL (ref 0.3–1)
MONOCYTES NFR BLD: 12.1 % (ref 4–15)
NEUTROPHILS # BLD AUTO: 2.6 K/UL (ref 1.8–7.7)
NEUTROPHILS NFR BLD: 49 % (ref 38–73)
NONHDLC SERPL-MCNC: 34 MG/DL
NRBC BLD-RTO: 0 /100 WBC
PLATELET # BLD AUTO: 172 K/UL (ref 150–350)
PMV BLD AUTO: 9.3 FL (ref 9.2–12.9)
POTASSIUM SERPL-SCNC: 4 MMOL/L (ref 3.5–5.1)
PROT SERPL-MCNC: 7.4 G/DL (ref 6–8.4)
RBC # BLD AUTO: 4.07 M/UL (ref 4.6–6.2)
SODIUM SERPL-SCNC: 121 MMOL/L (ref 136–145)
TRIGL SERPL-MCNC: 57 MG/DL (ref 30–150)
WBC # BLD AUTO: 5.27 K/UL (ref 3.9–12.7)

## 2020-01-08 PROCEDURE — 80061 LIPID PANEL: CPT

## 2020-01-08 PROCEDURE — 99213 PR OFFICE/OUTPT VISIT, EST, LEVL III, 20-29 MIN: ICD-10-PCS | Mod: S$PBB,,, | Performed by: NURSE PRACTITIONER

## 2020-01-08 PROCEDURE — 36415 COLL VENOUS BLD VENIPUNCTURE: CPT

## 2020-01-08 PROCEDURE — 99214 OFFICE O/P EST MOD 30 MIN: CPT | Performed by: NURSE PRACTITIONER

## 2020-01-08 PROCEDURE — 85025 COMPLETE CBC W/AUTO DIFF WBC: CPT

## 2020-01-08 PROCEDURE — 80053 COMPREHEN METABOLIC PANEL: CPT

## 2020-01-08 PROCEDURE — 99213 OFFICE O/P EST LOW 20 MIN: CPT | Mod: S$PBB,,, | Performed by: NURSE PRACTITIONER

## 2020-01-08 NOTE — PROGRESS NOTES
SUBJECTIVE:      Patient ID: Josh Coulter is a 49 y.o. male.    Chief Complaint: Follow-up (labs)    Pt presents for F/U appointment with lab review. However, he had just completed his labs prior to coming to the appointment and they are not resulted yet. He saw Dr. Mena for his spondylolisthesis to his lumbar region and he is currently completing 6 weeks of physical therapy before getting MRI and possible surgery. Reports he is doing well with PT and he is attending every session as scheduled. Dr. Mena is filling his pain medications for the chronic back pain and his pain is relieved at this time. He is requesting his muscle relaxers to be refilled but I requested he contact Dr. Mena for refills since he is managing his back pain. In reviewing his preventive care interventions, PNA and shingles vaccines are recommended but he is refusing at this time. He reports he had an appointment with GI for colonoscopy, but failed to make the appointment. He will reschedule. Denies CP, SOB, wheezing, fevers, nausea, vomiting, diarrhea, constipation, numbness, weakness, dizziness, palpitations, or any other concerns at this time.      Past Surgical History:   Procedure Laterality Date    HERNIA REPAIR      SPINE SURGERY  2009     Family History   Problem Relation Age of Onset    Hyperlipidemia Mother     Hypertension Mother     Hyperlipidemia Father     Hypertension Father       Social History     Socioeconomic History    Marital status:      Spouse name: Not on file    Number of children: 3    Years of education: Not on file    Highest education level: Not on file   Occupational History    Occupation: unemployed   Social Needs    Financial resource strain: Not on file    Food insecurity:     Worry: Not on file     Inability: Not on file    Transportation needs:     Medical: Not on file     Non-medical: Not on file   Tobacco Use    Smoking status: Current Every Day Smoker     Packs/day: 1.00      Years: 19.00     Pack years: 19.00     Types: Cigarettes    Smokeless tobacco: Never Used   Substance and Sexual Activity    Alcohol use: Yes     Alcohol/week: 14.0 standard drinks     Types: 14 Cans of beer per week     Comment: 2 beers per night    Drug use: Yes     Types: Marijuana    Sexual activity: Yes     Partners: Female   Lifestyle    Physical activity:     Days per week: Not on file     Minutes per session: Not on file    Stress: Very much   Relationships    Social connections:     Talks on phone: Not on file     Gets together: Not on file     Attends Yazdanism service: Not on file     Active member of club or organization: Not on file     Attends meetings of clubs or organizations: Not on file     Relationship status: Not on file   Other Topics Concern    Not on file   Social History Narrative    Not on file     Current Outpatient Medications   Medication Sig Dispense Refill    COMPRESSION SOCKS, LARGE MISC Please use to help with your leg swelling.      cyclobenzaprine (FLEXERIL) 5 MG tablet Take 2 tablets (10 mg total) by mouth 3 (three) times daily as needed for Muscle spasms. 30 tablet 0    HYDROcodone-acetaminophen (NORCO) 7.5-325 mg per tablet Take 1 tablet by mouth every 12 (twelve) hours as needed for Pain. 14 tablet 0    sildenafil (REVATIO) 20 mg Tab Take 1 tablet (20 mg total) by mouth daily as needed (erectile dysfunction). 30 tablet 1     No current facility-administered medications for this visit.      Review of patient's allergies indicates:  No Known Allergies   History reviewed. No pertinent past medical history.  Past Surgical History:   Procedure Laterality Date    HERNIA REPAIR      SPINE SURGERY  2009       Review of Systems   Constitutional: Negative for activity change, appetite change, chills, fatigue, fever and unexpected weight change.   HENT: Negative for congestion, ear pain, mouth sores, nosebleeds, rhinorrhea, sinus pressure, sinus pain, sneezing, sore throat  "and trouble swallowing.    Eyes: Negative for pain and visual disturbance.   Respiratory: Negative for apnea, cough, chest tightness, shortness of breath, wheezing and stridor.    Cardiovascular: Negative for chest pain, palpitations and leg swelling.   Gastrointestinal: Negative for abdominal pain, blood in stool, constipation, diarrhea, nausea and vomiting.   Genitourinary: Negative for dysuria, flank pain, frequency and hematuria.   Musculoskeletal: Positive for back pain and gait problem (walks with cane). Negative for arthralgias, myalgias and neck stiffness.   Skin: Negative for color change, rash and wound.   Neurological: Positive for weakness and numbness. Negative for dizziness, tremors, seizures, syncope and headaches.   Hematological: Negative for adenopathy.   Psychiatric/Behavioral: Negative for confusion, sleep disturbance and suicidal ideas.      OBJECTIVE:      Vitals:    01/08/20 1303 01/08/20 1330   BP: (!) 142/84 120/88   BP Location: Left arm Left arm   Patient Position: Sitting Sitting   BP Method: Medium (Manual)    Pulse: 108    Temp: 98.2 °F (36.8 °C)    TempSrc: Oral    SpO2: 99%    Weight: 74.3 kg (163 lb 12.8 oz)    Height: 6' 4" (1.93 m)      Physical Exam   Constitutional: He is oriented to person, place, and time. Vital signs are normal. He appears well-developed and well-nourished. No distress.   Walks with cane   HENT:   Head: Normocephalic and atraumatic.   Right Ear: Hearing, tympanic membrane, external ear and ear canal normal.   Left Ear: Hearing, tympanic membrane, external ear and ear canal normal.   Nose: Nose normal. No mucosal edema or rhinorrhea.   Mouth/Throat: Uvula is midline, oropharynx is clear and moist and mucous membranes are normal. No oropharyngeal exudate, posterior oropharyngeal edema or posterior oropharyngeal erythema.   Eyes: Pupils are equal, round, and reactive to light. Conjunctivae, EOM and lids are normal. Right eye exhibits no discharge. Left eye " exhibits no discharge. No scleral icterus.   Neck: Trachea normal, normal range of motion, full passive range of motion without pain and phonation normal. Neck supple. No tracheal deviation present. No thyromegaly present.   Cardiovascular: Normal rate, regular rhythm, normal heart sounds, intact distal pulses and normal pulses. Exam reveals no gallop and no friction rub.   No murmur heard.  Pulmonary/Chest: Effort normal and breath sounds normal. No stridor. No respiratory distress. He has no decreased breath sounds. He has no wheezes. He has no rhonchi. He has no rales.   Abdominal: Soft. Normal appearance and bowel sounds are normal. There is no tenderness.   Musculoskeletal: Normal range of motion. He exhibits no edema.   Lymphadenopathy:     He has no cervical adenopathy.        Right: No supraclavicular adenopathy present.        Left: No supraclavicular adenopathy present.   Neurological: He is alert and oriented to person, place, and time.   Skin: Skin is warm, dry and intact. Capillary refill takes less than 2 seconds. No rash noted. He is not diaphoretic.   Psychiatric: He has a normal mood and affect. His speech is normal and behavior is normal. Judgment and thought content normal. Cognition and memory are normal. He expresses no suicidal plans.   Vitals reviewed.     Assessment:       1. Spondylolisthesis of lumbar region        Plan:       Spondylolisthesis of lumbar region  Will call with lab results when they are completed. Continue F/U with Dr. Mena for care of this diagnosis and continue physical therapy as ordered. Pt will have pharmacy call for any refills needed. F/U in 6 months or sooner if any lab abnormalities.      Follow up in about 6 months (around 7/8/2020) for F/U back pain.      1/8/2020 Elana Lopez, EVANS, FNP

## 2020-01-09 NOTE — PROGRESS NOTES
Please call patient. There are some abnormal lab values. Please schedule appointment next week to discuss.

## 2020-01-10 ENCOUNTER — TELEPHONE (OUTPATIENT)
Dept: FAMILY MEDICINE | Facility: CLINIC | Age: 50
End: 2020-01-10

## 2020-01-10 ENCOUNTER — CLINICAL SUPPORT (OUTPATIENT)
Dept: REHABILITATION | Facility: HOSPITAL | Age: 50
End: 2020-01-10
Attending: ORTHOPAEDIC SURGERY
Payer: MEDICARE

## 2020-01-10 DIAGNOSIS — R26.9 GAIT ABNORMALITY: ICD-10-CM

## 2020-01-10 PROCEDURE — 97110 THERAPEUTIC EXERCISES: CPT | Mod: PN

## 2020-01-10 NOTE — TELEPHONE ENCOUNTER
----- Message from JORGE Booth sent at 1/9/2020  5:04 PM CST -----  Please call patient. There are some abnormal lab values. Please schedule appointment next week to discuss.

## 2020-01-10 NOTE — PROGRESS NOTES
Physical Therapy Daily Treatment Note     Time In: 1306  Time Out: 1350  Total Billable Time: 44 minutes    Name: Josh Coulter  Clinic Number: [id number]    Therapy Diagnosis:   Encounter Diagnosis   Name Primary?    Gait abnormality      Physician: Cholo Mena MD    Visit Date: 1/10/2020    Physician Orders: PT Eval and Treat   Medical Diagnosis from Referral:   M48.00 (ICD-10-CM) - Adjacent segment disease with spinal stenosis   Z98.1 (ICD-10-CM) - History of lumbar fusion   M43.16 (ICD-10-CM) - Spondylolisthesis at L4-L5 level      Evaluation Date: 12/20/2019  Authorization Period Expiration: 12/19/2020  Plan of Care Expiration: 2/14/2020  Visit # / Visits authorized: 2/12        Precautions: Fall, 2 person assist for ambulation safety    Subjective     Pt reports: rainy weather is bothering his back. Wants to get his surgery so he can start being active again. Reports no significant soreness following last treatment due to MHP and IFC after exercises.   Response to previous treatment: good  Functional change: none    Pain: 8/10  Location:  Low back     Objective       Josh received therapeutic exercises to develop strength, endurance, ROM, flexibility and core stabilization for 29 minutes including:     PPT 2 x 10 c/ 5 sec holds  LTR's 2 x 10 L/R (gentle, small ROM)  Hooklying hip adduction (gentle)  ball squeezes 2 x 10 c/ 5 sec holds  Hooklying TA sets c/ PB 2 x 10 c/ 5 sec holds     Gait: CGA to close SBA with SPC, cues for sequencing/pace      Josh received the following supervised modalities after being cleared for contradictions: IFC Electrical Stimulation:  Josh received IFC Electrical Stimulation for pain control applied to the low back. Pt received stimulation x 15 minutes with moist hot pack in prone position. Josh tolderated treatment well without any adverse effects.        Education provided:     - Continue with current home exercise program as instructed  - Discussed monitoring symptoms  and stopping activities which cause increased pain    Written Home Exercises Provided: Patient instructed to cont prior HEP.  Exercises were reviewed and Josh was able to demonstrate them prior to the end of the session.  Josh demonstrated good  understanding of the education provided.      See EMR under Media for exercises provided 12/31/2019.     Assessment     Cues for therex technique and recall. LE trembling with all hip adduction ball squeezes. Improved pain level following MHP + IFC application in prone, pillow under abdomen for comfort. Continues to ambulate slowly with SPC, CGA to close SBA for safety. Pain level post-session 8/10.     Josh is progressing well towards his goals.   Pt prognosis is Fair.     Pt will continue to benefit from skilled outpatient physical therapy to address the deficits listed in the problem list box on initial evaluation, provide pt/family education and to maximize pt's level of independence in the home and community environment.     Pt's spiritual, cultural and educational needs considered and pt agreeable to plan of care and goals.     Anticipated barriers to physical therapy: pain    Goals:  Short Term Goals: 3 weeks   1) Patient will initiate HEP  2) Patient will improve LE strength 1/2 muscle grade     Long Term Goals: 6 weeks   1) Patient will be independent in HEP  2) Patient will return to I/ADL's with pain <6/10 and strength 4/5 or >  3) Patient will improve functional outcome measure Oswestry to <70% impairment     Short Term Goal Status:  1) Met  2) Not met        Long Term Goal Status:  1) Met  2) Not met  3) Not met       Plan     Continue with established Plan of Care towards PT goals.

## 2020-01-13 RX ORDER — HYDROCODONE BITARTRATE AND ACETAMINOPHEN 7.5; 325 MG/1; MG/1
1 TABLET ORAL EVERY 12 HOURS PRN
Qty: 14 TABLET | Refills: 0 | Status: SHIPPED | OUTPATIENT
Start: 2020-01-13 | End: 2020-01-20

## 2020-01-13 NOTE — TELEPHONE ENCOUNTER
----- Message from Radha Rendon sent at 1/10/2020 11:13 AM CST -----  Contact: patient  Patient is calling to get a refill on Hydrocodone 7.5 mg, call patient back at [phone].

## 2020-01-13 NOTE — TELEPHONE ENCOUNTER
Spoke to patient about abnormal labs. Expressed that the provider wanted him to come in to discuss labs and patient stated that he needed a week to get himself together and would call back then to make an appointment.

## 2020-01-15 ENCOUNTER — CLINICAL SUPPORT (OUTPATIENT)
Dept: REHABILITATION | Facility: HOSPITAL | Age: 50
End: 2020-01-15
Attending: ORTHOPAEDIC SURGERY
Payer: MEDICARE

## 2020-01-15 DIAGNOSIS — R26.9 GAIT ABNORMALITY: ICD-10-CM

## 2020-01-15 PROCEDURE — 97032 APPL MODALITY 1+ESTIM EA 15: CPT | Mod: PN,CQ

## 2020-01-15 PROCEDURE — 97110 THERAPEUTIC EXERCISES: CPT | Mod: PN,CQ

## 2020-01-15 PROCEDURE — 97010 HOT OR COLD PACKS THERAPY: CPT | Mod: PN,CQ

## 2020-01-15 NOTE — PROGRESS NOTES
Physical Therapy Daily Treatment Note     Name: Josh Coulter  Clinic Number: [id number]    Therapy Diagnosis:   Encounter Diagnosis   Name Primary?    Gait abnormality      Physician: Cholo Mena MD    Visit Date: 1/15/2020    Physician Orders: PT Eval and Treat   Medical Diagnosis from Referral:   M48.00 (ICD-10-CM) - Adjacent segment disease with spinal stenosis   Z98.1 (ICD-10-CM) - History of lumbar fusion   M43.16 (ICD-10-CM) - Spondylolisthesis at L4-L5 level     Evaluation Date: 12/20/2019  Authorization Period Expiration: 12/19/2020  Plan of Care Expiration: 2/14/2020  Visit # / Visits authorized: 4/16    Time In: 1305  Time Out: 1355  Total Billable Time: 50 minutes    Precautions: Fall    Subjective     Pt reports: Not as painful today 2nd to not being a cold day..  He was compliant with home exercise program.  Response to previous treatment: no problems reported, reported liking heat with IFC at end of session  Functional change: none stated    Pain: 8/10  Location: bilateral back      Objective     Josh received therapeutic exercises to develop strength, endurance, flexibility and core stabilization for 35 minutes including:    Sit<>Supine SBA with VC/TC for log roll    PPT x30  LTR 2x10 with Tball R/L  Heel slide 2x10 R/LL   TrA sets x30 supine with Tball  Ball squeeze x30 supine    Gait with SC in L hand 2x130' SBA    Josh received the following supervised modalities after being cleared for contradictions: IFC Electrical Stimulation:  Josh received IFC Electrical Stimulation for pain control applied to the low back B. Pt received stimulation at 100 % scan at a frequency of 58.5mA for 15 minutes along with hot pack. Josh tolerated treatment well without any adverse effects.      Josh received hot pack for 15 minutes to low back during IFC.    Home Exercises Provided and Patient Education Provided     Education provided:   - VC for proper form    Written Home Exercises Provided: Patient  instructed to cont prior HEP.  Exercises were reviewed and Josh was able to demonstrate them prior to the end of the session.  Josh demonstrated good  understanding of the education provided.     See EMR under Patient Instructions for exercises provided prior visit.    Assessment     Patient tolerated activities with rest breaks    Josh is progressing towards his goals, reports doing HEP and tolerating increased activities.    Pt will continue to benefit from skilled outpatient physical therapy to address the deficits listed in the problem list box on initial evaluation, provide pt/family education and to maximize pt's level of independence in the home and community environment.     Pt's spiritual, cultural and educational needs considered and pt agreeable to plan of care and goals.     Goals: progressing towards    Plan     Continue with POC to increase activities as patient tolerates.    Tea Mares, PTA

## 2020-01-22 ENCOUNTER — CLINICAL SUPPORT (OUTPATIENT)
Dept: REHABILITATION | Facility: HOSPITAL | Age: 50
End: 2020-01-22
Attending: ORTHOPAEDIC SURGERY
Payer: MEDICARE

## 2020-01-22 DIAGNOSIS — R26.9 GAIT ABNORMALITY: ICD-10-CM

## 2020-01-22 DIAGNOSIS — Z98.1 HISTORY OF LUMBAR FUSION: Primary | ICD-10-CM

## 2020-01-22 DIAGNOSIS — M43.16 SPONDYLOLISTHESIS AT L4-L5 LEVEL: ICD-10-CM

## 2020-01-22 PROCEDURE — 97110 THERAPEUTIC EXERCISES: CPT | Mod: PN,CQ

## 2020-01-22 RX ORDER — HYDROCODONE BITARTRATE AND ACETAMINOPHEN 7.5; 325 MG/1; MG/1
1 TABLET ORAL EVERY 6 HOURS PRN
Qty: 28 TABLET | Refills: 0 | Status: SHIPPED | OUTPATIENT
Start: 2020-01-22 | End: 2020-01-29

## 2020-01-22 NOTE — PROGRESS NOTES
"  Physical Therapy Daily Treatment Note     Name: Josh Coulter  Clinic Number: [id number]    Therapy Diagnosis:   Encounter Diagnosis   Name Primary?    Gait abnormality      Physician: Cholo Mena MD    Visit Date: 1/22/2020    Physician Orders: PT Eval and Treat   Medical Diagnosis from Referral:   M48.00 (ICD-10-CM) - Adjacent segment disease with spinal stenosis   Z98.1 (ICD-10-CM) - History of lumbar fusion   M43.16 (ICD-10-CM) - Spondylolisthesis at L4-L5 level      Evaluation Date: 12/20/2019  Authorization Period Expiration: 12/19/2020  Plan of Care Expiration: 2/14/2020  Visit # / Visits authorized: 5/16    Time In: 1323 (patient late for appt)  Time Out: 1400  Total Billable Time: 47 minutes    Precautions: Fall    Subjective     Pt reports: Increased pain today, reports getting "some relief" with Hot pack and IFC treatments (reports 6/10 pain while on IFC and heat, 8/10 after treatment).  He was not compliant with home exercise program.  Response to previous treatment: No problems  Functional change: none    Pain: 10/10  Location: bilateral back      Objective     Josh received therapeutic exercises to develop strength, endurance, flexibility and core stabilization for 37 minutes including:    PPT x30  Ball squeeze x30 supine  LTR 3x10 R/L    Gait with SC in L hand 2x130' Supervision     Josh received the following supervised modalities after being cleared for contradictions: IFC Electrical Stimulation:  Josh received IFC Electrical Stimulation for pain control applied to B low back. Pt received stimulation at 100 % scan at a frequency of 54.5mA for 10 minutes along with hot pack. Josh tolerated treatment well without any adverse effects.      Josh received hot pack for 10 minutes to low back along with IFC teatment.      Home Exercises Provided and Patient Education Provided     Education provided:   - Continued HEP even on bad days.    Written Home Exercises Provided: Patient instructed to " cont prior HEP.  Exercises were reviewed and Josh was able to demonstrate them prior to the end of the session.  Josh demonstrated fair  understanding of the education provided.     See EMR under Patient Instructions for exercises provided prior visit.    Assessment     Patient with poor tolerance to exercises today.    Josh is not progressing well towards his goals.     Pt will continue to benefit from skilled outpatient physical therapy to address the deficits listed in the problem list box on initial evaluation, provide pt/family education and to maximize pt's level of independence in the home and community environment.     Pt's spiritual, cultural and educational needs considered and pt agreeable to plan of care and goals.    Plan     Continue with POC to increase activities as patient tolerates.    Tea Mares, PTA

## 2020-01-22 NOTE — TELEPHONE ENCOUNTER
Patient notified rx is ready. He will be completed therapy on 2/4. Made the patient an appt for 2/5.

## 2020-01-22 NOTE — TELEPHONE ENCOUNTER
----- Message from Radha Rendon sent at 1/21/2020 10:25 AM CST -----  Contact: patient  Patient is calling to get a refill on Hydrocodone 7.5mg, please call patient back at [phone].

## 2020-01-29 ENCOUNTER — CLINICAL SUPPORT (OUTPATIENT)
Dept: REHABILITATION | Facility: HOSPITAL | Age: 50
End: 2020-01-29
Attending: ORTHOPAEDIC SURGERY
Payer: MEDICARE

## 2020-01-29 DIAGNOSIS — R26.9 GAIT ABNORMALITY: ICD-10-CM

## 2020-01-29 PROCEDURE — 97110 THERAPEUTIC EXERCISES: CPT | Mod: PN,CQ

## 2020-01-29 NOTE — PROGRESS NOTES
Physical Therapy Daily Treatment Note     Name: Josh Coulter  Clinic Number: [id number]    Therapy Diagnosis:   Encounter Diagnosis   Name Primary?    Gait abnormality      Physician: Cholo Mena MD    Visit Date: 1/29/2020    Physician Orders: PT Eval and Treat   Medical Diagnosis from Referral:   M48.00 (ICD-10-CM) - Adjacent segment disease with spinal stenosis   Z98.1 (ICD-10-CM) - History of lumbar fusion   M43.16 (ICD-10-CM) - Spondylolisthesis at L4-L5 level      Evaluation Date: 12/20/2019  Authorization Period Expiration: 12/19/2020  Plan of Care Expiration: 2/14/2020  Visit # / Visits authorized: 6/16    Time In: 1320 (late for appt)  Time Out: 1355  Total Billable Time: 20 minutes    Precautions: Fall    Subjective     Pt reports: Took pain meds 2 hours ago, reports ambulating with SC in R hand when outside 2nd to not feeling safe with cane in L hand.  He was not compliant with home exercise program.  Response to previous treatment: no complaints  Functional change: none stated, none observed    Pain: 9/10  Location: bilateral back      Objective     Josh received therapeutic exercises to develop strength, endurance and flexibility for 20 minutes including:    Gait with SC with cues to place in L hand and cues for sequencing    Bike Lv1 7' (reported felt good on his legs, instructed patient to stop if fatigued or hurting, stopped 2nd to pain)  PPT supine 3x10    Josh received the following supervised modalities after being cleared for contradictions: IFC Electrical Stimulation:  Josh received IFC Electrical Stimulation for pain control applied to B low back. Pt received stimulation at 100 % scan at a frequency of 60.0mA for 10 minutes along with hot pack. Josh tolerated treatment well without any adverse effects.       Josh received hot pack for 10 minutes to low back along with IFC teatment.    Home Exercises Provided and Patient Education Provided     Education provided:   - Stop exercises  when increased pain is felt.    Written Home Exercises Provided: Patient instructed to cont prior HEP.  Exercises were reviewed and Josh was able to demonstrate them prior to the end of the session.  Josh demonstrated poor understanding of the education provided.     See EMR under Media for exercises provided prior visit.    Assessment     Patient with poor tolerance to all activities 2nd to c/o pain, poor follow up with HEP, and poor follow through with gait training with SC.  Josh is not progressing well towards his goals.   Pt prognosis is Guarded.     Pt will continue to benefit from skilled outpatient physical therapy to address the deficits listed in the problem list box on initial evaluation, provide pt/family education and to maximize pt's level of independence in the home and community environment.     Pt's spiritual, cultural and educational needs considered and pt agreeable to plan of care and goals.     Anticipated barriers to physical therapy: Pain    Goals:     Short Term Goals: 3 weeks   1) Patient will initiate HEP  2) Patient will improve LE strength 1/2 muscle grade     Long Term Goals: 6 weeks   1) Patient will be independent in HEP  2) Patient will return to I/ADL's with pain <6/10 and strength 4/5 or >  3) Patient will improve functional outcome measure Oswestry to <70% impairment    Plan     Continue with POC to increase activities as patient tolerates as well as encouraging use of cane in L hand to support R LE.    Tea Mares, PTA

## 2020-02-05 DIAGNOSIS — Z98.1 HISTORY OF LUMBAR FUSION: Primary | ICD-10-CM

## 2020-02-05 DIAGNOSIS — M43.16 SPONDYLOLISTHESIS OF LUMBAR REGION: ICD-10-CM

## 2020-02-05 DIAGNOSIS — M43.16 SPONDYLOLISTHESIS AT L4-L5 LEVEL: ICD-10-CM

## 2020-02-05 RX ORDER — HYDROCODONE BITARTRATE AND ACETAMINOPHEN 7.5; 325 MG/1; MG/1
1 TABLET ORAL EVERY 6 HOURS PRN
Qty: 28 TABLET | Refills: 0 | Status: SHIPPED | OUTPATIENT
Start: 2020-02-05 | End: 2020-02-12 | Stop reason: SDUPTHER

## 2020-02-05 RX ORDER — CYCLOBENZAPRINE HCL 5 MG
TABLET ORAL
Qty: 30 TABLET | Refills: 0 | OUTPATIENT
Start: 2020-02-05

## 2020-02-05 NOTE — TELEPHONE ENCOUNTER
----- Message from Radha Rendon sent at 2/4/2020  3:24 PM CST -----  Contact: patient  We had to r/s his appt tomorrow to 2/12 with Dr. Mena and he was wondering if he could get a refill on his pain medication until his next scheduled appt, call him back at [phone].

## 2020-02-10 ENCOUNTER — CLINICAL SUPPORT (OUTPATIENT)
Dept: REHABILITATION | Facility: HOSPITAL | Age: 50
End: 2020-02-10
Attending: ORTHOPAEDIC SURGERY
Payer: MEDICAID

## 2020-02-10 DIAGNOSIS — R26.9 GAIT ABNORMALITY: ICD-10-CM

## 2020-02-10 PROCEDURE — 97010 HOT OR COLD PACKS THERAPY: CPT | Mod: PN

## 2020-02-10 PROCEDURE — 97014 ELECTRIC STIMULATION THERAPY: CPT | Mod: PN

## 2020-02-11 NOTE — PLAN OF CARE
Outpatient Therapy Discharge Summary     Name: Josh Coulter  [facility] Number: [id number]    Therapy Diagnosis:   Encounter Diagnosis   Name Primary?    Gait abnormality      Physician: Cholo Mena MD    Physician Orders: PT Eval and Treat  Medical Diagnosis:   M48.00 (ICD-10-CM) - Adjacent segment disease with spinal stenosis   Z98.1 (ICD-10-CM) - History of lumbar fusion   M43.16 (ICD-10-CM) - Spondylolisthesis at L4-L5 level      Evaluation Date: 12/20/2019      Date of Last visit: 1/29/2020  Total Visits Received: 8  Cancelled Visits: 5  No Show Visits: 0    Subjective      Patient reporting 9/10 pain today. States therapy has not helped his pain, he is staying sore. Reports compliance with HEP. Patient becoming tearful and stating he just wants surgery.    Objective      Reassessment performed for POC update purposes:    Posture: unable to stand completely erect; rounded shoulders and fwd head position    Gait: antalgic, slow with SPC; dragging feet and using cane on incorrect side despite education given in previous sessions    Lower Extremity Range of Motion:  Right Lower Extremity: guarded  Left Lower Extremity: guarded     Lower Extremity Strength:  Right Lower Extremity: 2+/5 hip flex, knee ext 3/5, knee flex 3/5, ankle DF 4-/5; some giving way  Left Lower Extremity: 3-/5 hip flex, knee ext 3/5, knee flex 3/5, ankle DF 3+/5; some giving way     The Revised Oswestry Low Back Pain Questionnaire: 43/50=86% impairment    Patient receiving MHP + IFC to low back in prone position (pillow under hips for comfort) x 15 minutes     Assessment      Patient has attended 8 out of 12 prescribed therapy appointments since SOC and reports worsening of pain level overall despite his compliance with HEP. Strength upon resistive testing worse today than at SOC and no improvement in gait pattern. Worsened functional mobility reported using standardized questionnaire. Patient with decreased pain following MHP application  and IFC in prone position for 15 minutes. Discussed HEP with patient, asked if he was doing the number of repetitions issued in handout. He reports he just does as much as he can because he's trying to get better. Recommended to patient that he decrease repetitions to tolerable number, for example 1 set of 10, rather than performing more than issued. He should stop if his pain starts to increase. Also recommended that patient speak to doctor regarding home TENS unit to decrease his pain, as this has been helpful during his treatments here. Patient verbalizing understanding and agreement.    Goals: 1/2 short term goals attained and 1 out of 3 long term goals attained    Discharge reason: Patient has completed the physician's prescription and Patient has reached the maximum rehab potential for the present time    Plan   This patient is discharged from Physical Therapy

## 2020-02-12 ENCOUNTER — OFFICE VISIT (OUTPATIENT)
Dept: ORTHOPEDICS | Facility: CLINIC | Age: 50
End: 2020-02-12
Payer: MEDICARE

## 2020-02-12 VITALS — DIASTOLIC BLOOD PRESSURE: 90 MMHG | BODY MASS INDEX: 19.48 KG/M2 | SYSTOLIC BLOOD PRESSURE: 130 MMHG | WEIGHT: 160 LBS

## 2020-02-12 DIAGNOSIS — R26.9 GAIT ABNORMALITY: ICD-10-CM

## 2020-02-12 DIAGNOSIS — Z98.1 HISTORY OF LUMBAR FUSION: ICD-10-CM

## 2020-02-12 DIAGNOSIS — M43.16 SPONDYLOLISTHESIS AT L4-L5 LEVEL: ICD-10-CM

## 2020-02-12 PROCEDURE — 99213 OFFICE O/P EST LOW 20 MIN: CPT | Mod: S$GLB,,, | Performed by: ORTHOPAEDIC SURGERY

## 2020-02-12 PROCEDURE — 99213 PR OFFICE/OUTPT VISIT, EST, LEVL III, 20-29 MIN: ICD-10-PCS | Mod: S$GLB,,, | Performed by: ORTHOPAEDIC SURGERY

## 2020-02-12 RX ORDER — HYDROCODONE BITARTRATE AND ACETAMINOPHEN 7.5; 325 MG/1; MG/1
1 TABLET ORAL EVERY 6 HOURS PRN
Qty: 28 TABLET | Refills: 0 | Status: SHIPPED | OUTPATIENT
Start: 2020-02-12 | End: 2020-02-19

## 2020-02-12 NOTE — PROGRESS NOTES
Subjective:       Patient ID: Josh Coulter is a 49 y.o. male.    Chief Complaint: Pain of the Lumbar Spine (Lumbar pain is now severe. Pain radiates down both legs to feet with numbness. PT completed. need to resubmit for Lumbar MRI to medicaid.)      History of Present Illness  Patient is here to follow-up for constant low back pain with bilateral lower extremity radiculitis dysesthesia to the feet. No bowel or bladder incontinence.  His right leg bothers him more than the left leg.    History as below.  This man had lumbar surgery in 2010 and was improved but not pain free until 2017. In 2017 he fell and had a worsening of his symptoms. He saw a doctor who told him that he damaged the disc both above and below his previous fusion.      When he was originally evaluated in early December 2019 we recommended an updated lumbar MRI without contrast.  Of course this was denied by Medicaid until he completed a course of physical therapy.  He has since completed the structured physical therapy program and it did not help with his pain.    Current Medications  Current Outpatient Medications   Medication Sig Dispense Refill    cyclobenzaprine (FLEXERIL) 5 MG tablet Take 2 tablets (10 mg total) by mouth 3 (three) times daily as needed for Muscle spasms. 30 tablet 0    HYDROcodone-acetaminophen (NORCO) 7.5-325 mg per tablet Take 1 tablet by mouth every 6 (six) hours as needed for Pain. 28 tablet 0    sildenafil (REVATIO) 20 mg Tab Take 1 tablet (20 mg total) by mouth daily as needed (erectile dysfunction). 30 tablet 1     No current facility-administered medications for this visit.        Allergies  Review of patient's allergies indicates:  No Known Allergies    Past Medical History  History reviewed. No pertinent past medical history.    Surgical History  Past Surgical History:   Procedure Laterality Date    HERNIA REPAIR      SPINE SURGERY  2009       Family History:   Family History   Problem Relation Age of Onset     Hyperlipidemia Mother     Hypertension Mother     Hyperlipidemia Father     Hypertension Father        Social History:   Social History     Socioeconomic History    Marital status:      Spouse name: Not on file    Number of children: 3    Years of education: Not on file    Highest education level: Not on file   Occupational History    Occupation: unemployed   Social Needs    Financial resource strain: Not on file    Food insecurity:     Worry: Not on file     Inability: Not on file    Transportation needs:     Medical: Not on file     Non-medical: Not on file   Tobacco Use    Smoking status: Current Every Day Smoker     Packs/day: 1.00     Years: 19.00     Pack years: 19.00     Types: Cigarettes    Smokeless tobacco: Never Used   Substance and Sexual Activity    Alcohol use: Yes     Alcohol/week: 14.0 standard drinks     Types: 14 Cans of beer per week     Comment: 2 beers per night    Drug use: Yes     Types: Marijuana    Sexual activity: Yes     Partners: Female   Lifestyle    Physical activity:     Days per week: Not on file     Minutes per session: Not on file    Stress: Very much   Relationships    Social connections:     Talks on phone: Not on file     Gets together: Not on file     Attends [employer] service: Not on file     Active member of club or organization: Not on file     Attends meetings of clubs or organizations: Not on file     Relationship status: Not on file   Other Topics Concern    Not on file   Social History Narrative    Not on file       Hospitalization/Major Diagnostic Procedure:     Review of Systems     General/Constitutional:  Chills denies. Fatigue denies. Fever denies. Weight gain denies. Weight loss denies.    Respiratory:  Shortness of breath denies.    Cardiovascular:  Chest pain denies.    Gastrointestinal:  Constipation denies. Diarrhea denies. Nausea denies. Vomiting denies.     Hematology:  Easy bruising denies. Prolonged bleeding denies.      Genitourinary:  Frequent urination denies. Pain in lower back denies. Painful urination denies.     Musculoskeletal:  See HPI for details    Skin:  Rash denies.    Neurologic:  Dizziness denies. Gait abnormalities denies. Seizures denies. Tingling/Numbess denies.    Psychiatric:  Anxiety denies. Depressed mood denies.     Objective:   Vital Signs:   Vitals:    02/12/20 1425   BP: (!) 130/90        Physical Exam      General Examination:     Constitutional: The patient is alert and oriented to lace person and time. Mood is pleasant.     Head/Face: Normal facial features normal eyebrows    Eyes: Normal extraocular motion bilaterally    Lungs: Respirations are equal and unlabored    Gait is coordinated.    Cardiovascular: There are no swelling or varicosities present.    Lymphatic: Negative for adenopathy    Skin: Normal    Neurological: Level of consciousness normal. Oriented to place person and time and situation    Psychiatric: Oriented to time place person and situation    Lumbar exam shows well-healed lumbar incision L3-S1 bilateral spinous muscle spasm no active extension marked room restriction of flexion and bending of the trunk.  Patient unable to stand erect Straight leg raising positive right side patellar Achilles reflexes symmetrical the knee range of motion normal subjective numbness in the  Nerve root distribution.  No edema adenopathy or varicosities noted.    XRAY Report/ Interpretation:  No new studies today but previous x-rays demonstrate AP pelvis x-ray was taken today. Indications low back pain and/or hip pain. Findings AP pelvis x-ray appears to be normal with no evidence of fractures or significant degenerative disease AP and lateral x-rays of lumbar spine will performed today. Indications low back pain. Findings:  AP and flexion-extension lateral x-rays of the lumbar spine were taken.  There is an instrumented lumbar fusion at L5-S1.  Flexion-extension lateral x-rays showed marked narrowing  "and spondylolisthesis at L4-5 flexion-extension view shows some translational movement at that level      Assessment:       1. Adjacent segment disease with spinal stenosis    2. History of lumbar fusion    3. Spondylolisthesis at L4-L5 level    4. Gait abnormality        Plan:       Josh was seen today for pain.    Diagnoses and all orders for this visit:    Adjacent segment disease with spinal stenosis    History of lumbar fusion    Spondylolisthesis at L4-L5 level    Gait abnormality         No follow-ups on file.  Pete Guzmán, physician's assistant served in the capacity as a "scribe" for this patient encounter  A "face to face" encounter occurred with Dr. Mena on this date  The treatment plan and medical decision making is outlined below:  Proceed with lumbar MRI without contrast.  Follow up in approximately 3 weeks review and discuss further treatment options. Patient continues to complain of neck pain in issues and we will address his cervical spine in the future.    This note was created using Dragon voice recognition software that occasionally misinterpreted phrases or words.  "

## 2020-02-17 ENCOUNTER — HOSPITAL ENCOUNTER (EMERGENCY)
Facility: HOSPITAL | Age: 50
Discharge: HOME | End: 2020-02-17
Attending: EMERGENCY MEDICINE
Payer: MEDICAID

## 2020-02-17 ENCOUNTER — HOSPITAL ENCOUNTER (OUTPATIENT)
Dept: RADIOLOGY | Facility: HOSPITAL | Age: 50
Discharge: HOME | End: 2020-02-17
Attending: PHYSICIAN ASSISTANT
Payer: MEDICARE

## 2020-02-17 VITALS
TEMPERATURE: 98 F | HEART RATE: 86 BPM | DIASTOLIC BLOOD PRESSURE: 72 MMHG | HEIGHT: 76 IN | BODY MASS INDEX: 19.48 KG/M2 | WEIGHT: 160 LBS | RESPIRATION RATE: 16 BRPM | SYSTOLIC BLOOD PRESSURE: 170 MMHG | OXYGEN SATURATION: 100 %

## 2020-02-17 DIAGNOSIS — M43.16 SPONDYLOLISTHESIS AT L4-L5 LEVEL: ICD-10-CM

## 2020-02-17 DIAGNOSIS — R26.9 GAIT ABNORMALITY: ICD-10-CM

## 2020-02-17 DIAGNOSIS — Z98.1 HISTORY OF LUMBAR FUSION: ICD-10-CM

## 2020-02-17 DIAGNOSIS — R55 SYNCOPE, UNSPECIFIED SYNCOPE TYPE: Primary | ICD-10-CM

## 2020-02-17 DIAGNOSIS — Z53.29 LEFT AGAINST MEDICAL ADVICE: ICD-10-CM

## 2020-02-17 PROCEDURE — 99283 EMERGENCY DEPT VISIT LOW MDM: CPT | Mod: 25

## 2020-02-17 PROCEDURE — 25500020 PHARM REV CODE 255: Mod: PO | Performed by: PHYSICIAN ASSISTANT

## 2020-02-17 PROCEDURE — A9585 GADOBUTROL INJECTION: HCPCS | Mod: PO | Performed by: PHYSICIAN ASSISTANT

## 2020-02-17 PROCEDURE — 72158 MRI LUMBAR SPINE W/O & W/DYE: CPT | Mod: TC,PO

## 2020-02-17 RX ORDER — GADOBUTROL 604.72 MG/ML
7.5 INJECTION INTRAVENOUS
Status: COMPLETED | OUTPATIENT
Start: 2020-02-17 | End: 2020-02-17

## 2020-02-17 RX ADMIN — GADOBUTROL 7.5 ML: 604.72 INJECTION INTRAVENOUS at 09:02

## 2020-02-17 NOTE — ED TRIAGE NOTES
EMS states pt was getting a CT with contrast at the imaging center and after receiving the contrast he kept getting hotter and hotter and when he went to get up he kept getting very dizzy and someone said he passed out. EMS states upon arrival he became hypotensive when they tried to get him up.

## 2020-02-17 NOTE — ED PROVIDER NOTES
Encounter Date: 2/17/2020       History     Chief Complaint   Patient presents with    Loss of Consciousness     Appearing male presents to the emergency department reports that he was at the imaging center he was in the MRI machine states he was feeling fine he reports the tech Clifton injected IV contrast he states he has never had this before and it started making him feel hot which made him nervous.  He states when he got out of the MRI machine he was very anxious felt like he was going to pass out he states that after the MRI EMS  was called for him to get checked out but reports that prior to even arriving the hospital he had no symptoms he states he feels fine and does not wish to have any further ER workup he wishes to leave.        Review of patient's allergies indicates:  No Known Allergies  No past medical history on file.  Past Surgical History:   Procedure Laterality Date    HERNIA REPAIR      SPINE SURGERY  2009     Family History   Problem Relation Age of Onset    Hyperlipidemia Mother     Hypertension Mother     Hyperlipidemia Father     Hypertension Father      Social History     Tobacco Use    Smoking status: Current Every Day Smoker     Packs/day: 1.00     Years: 19.00     Pack years: 19.00     Types: Cigarettes    Smokeless tobacco: Never Used   Substance Use Topics    Alcohol use: Yes     Alcohol/week: 14.0 standard drinks     Types: 14 Cans of beer per week     Comment: 2 beers per night    Drug use: Yes     Types: Marijuana     Review of Systems   Constitutional: Negative.    HENT: Negative.    Eyes: Negative.    Respiratory: Negative.    Cardiovascular: Negative.    Gastrointestinal: Negative.    Endocrine: Negative.    Genitourinary: Negative.    Musculoskeletal: Negative.    Skin: Negative.    Neurological: Positive for syncope.   Hematological: Negative.    Psychiatric/Behavioral: Negative.    All other systems reviewed and are negative.      Physical Exam     Initial Vitals  [02/17/20 1050]   BP Pulse Resp Temp SpO2   (!) 170/72 86 16 97.6 °F (36.4 °C) 100 %      MAP       --         Physical Exam    Nursing note and vitals reviewed.  Constitutional: He appears well-developed and well-nourished.   HENT:   Head: Normocephalic.   Right Ear: External ear normal.   Left Ear: External ear normal.   Eyes: EOM are normal. Pupils are equal, round, and reactive to light.   Cardiovascular: Normal rate, regular rhythm and normal heart sounds.   Pulmonary/Chest: Breath sounds normal. No respiratory distress.   Neurological: He is alert and oriented to person, place, and time.   Skin: Skin is warm and dry.   Psychiatric: He has a normal mood and affect.         ED Course- patient is awake alert and oriented had a reported syncopal episode after receiving a MRI with contrast the patient states that he thought that he just got nervous because he was not aware that the contrast was going to make it feel hot he states that he had no symptoms upon EMS arrival and has no symptoms now he reports he wishes to go home he is awake alert and oriented his no clinical signs of intoxication the patient did sign out against medical advice he understands the risks and the benefits.   Procedures  Labs Reviewed - No data to display       Imaging Results    None                       Attending Attestation:     Physician Attestation Statement for NP/PA:   I discussed this assessment and plan of this patient with the NP/PA, but I did not personally examine the patient. The face to face encounter was performed by the NP/PA.                                Clinical Impression:       ICD-10-CM ICD-9-CM   1. Syncope, unspecified syncope type R55 780.2   2. Left against medical advice Z53.20 V64.2                             JORGE Vallejo  02/17/20 1141       Channing Bonds MD  02/17/20 1716

## 2020-02-17 NOTE — DISCHARGE INSTRUCTIONS
Follow-up as directed  Return if you change of mind regarding further evaluation as offered for possible syncopal episode

## 2020-03-02 ENCOUNTER — OFFICE VISIT (OUTPATIENT)
Dept: ORTHOPEDICS | Facility: CLINIC | Age: 50
End: 2020-03-02
Payer: MEDICARE

## 2020-03-02 VITALS — DIASTOLIC BLOOD PRESSURE: 80 MMHG | BODY MASS INDEX: 21.18 KG/M2 | WEIGHT: 174 LBS | SYSTOLIC BLOOD PRESSURE: 150 MMHG

## 2020-03-02 DIAGNOSIS — M43.16 SPONDYLOLISTHESIS AT L4-L5 LEVEL: ICD-10-CM

## 2020-03-02 DIAGNOSIS — M53.2X6 LUMBAR SPINE INSTABILITY: ICD-10-CM

## 2020-03-02 DIAGNOSIS — M47.816 LUMBAR FACET ARTHROPATHY: ICD-10-CM

## 2020-03-02 DIAGNOSIS — Z98.1 HISTORY OF LUMBAR FUSION: ICD-10-CM

## 2020-03-02 DIAGNOSIS — M48.061 LUMBAR STENOSIS WITHOUT NEUROGENIC CLAUDICATION: Primary | ICD-10-CM

## 2020-03-02 DIAGNOSIS — M51.36 DISC DEGENERATION, LUMBAR: ICD-10-CM

## 2020-03-02 PROCEDURE — 99213 OFFICE O/P EST LOW 20 MIN: CPT | Mod: S$GLB,,, | Performed by: ORTHOPAEDIC SURGERY

## 2020-03-02 PROCEDURE — 99213 PR OFFICE/OUTPT VISIT, EST, LEVL III, 20-29 MIN: ICD-10-PCS | Mod: S$GLB,,, | Performed by: ORTHOPAEDIC SURGERY

## 2020-03-02 RX ORDER — HYDROCODONE BITARTRATE AND ACETAMINOPHEN 7.5; 325 MG/1; MG/1
1 TABLET ORAL EVERY 6 HOURS PRN
Qty: 28 TABLET | Refills: 0 | Status: SHIPPED | OUTPATIENT
Start: 2020-03-02 | End: 2020-03-09

## 2020-03-02 RX ORDER — HYDROCODONE BITARTRATE AND ACETAMINOPHEN 7.5; 325 MG/1; MG/1
1 TABLET ORAL EVERY 6 HOURS PRN
COMMUNITY
End: 2020-03-18 | Stop reason: SDUPTHER

## 2020-03-02 NOTE — PROGRESS NOTES
Subjective:       Patient ID: Josh Coulter is a 49 y.o. male.    Chief Complaint: Pain of the Lumbar Spine (MRI results. Lumbar is the same.)      History of Present Illness  Patient is here to follow-up for constant low back pain with bilateral lower extremity radiculitis dysesthesia to the feet. No bowel or bladder incontinence.  His right leg bothers him more than the left leg. He has an updated lumbar MRI to review.     History as below.  This man had lumbar surgery in 2010 and was improved but not pain free until 2017. In 2017 he fell and had a worsening of his symptoms. He saw a doctor who told him that he damaged the disc both above and below his previous fusion.       When he was originally evaluated in early December 2019 we recommended an updated lumbar MRI without contrast.  Of course this was denied by Medicaid until he completed a course of physical therapy.  He has since completed the structured physical therapy program and it did not help with his pain.    Current Medications  Current Outpatient Medications   Medication Sig Dispense Refill    cyclobenzaprine (FLEXERIL) 5 MG tablet Take 2 tablets (10 mg total) by mouth 3 (three) times daily as needed for Muscle spasms. 30 tablet 0    HYDROcodone-acetaminophen (NORCO) 7.5-325 mg per tablet Take 1 tablet by mouth every 6 (six) hours as needed for Pain.      sildenafil (REVATIO) 20 mg Tab Take 1 tablet (20 mg total) by mouth daily as needed (erectile dysfunction). 30 tablet 1    HYDROcodone-acetaminophen (NORCO) 7.5-325 mg per tablet Take 1 tablet by mouth every 6 (six) hours as needed for Pain. 28 tablet 0     No current facility-administered medications for this visit.        Allergies  Review of patient's allergies indicates:   Allergen Reactions    Contrast media Other (See Comments)     syncopy       Past Medical History  History reviewed. No pertinent past medical history.    Surgical History  Past Surgical History:   Procedure Laterality Date     HERNIA REPAIR      SPINE SURGERY  2009       Family History:   Family History   Problem Relation Age of Onset    Hyperlipidemia Mother     Hypertension Mother     Hyperlipidemia Father     Hypertension Father        Social History:   Social History     Socioeconomic History    Marital status: Legally      Spouse name: Not on file    Number of children: 3    Years of education: Not on file    Highest education level: Not on file   Occupational History    Occupation: unemployed   Social Needs    Financial resource strain: Not on file    Food insecurity:     Worry: Not on file     Inability: Not on file    Transportation needs:     Medical: Not on file     Non-medical: Not on file   Tobacco Use    Smoking status: Current Every Day Smoker     Packs/day: 1.00     Years: 19.00     Pack years: 19.00     Types: Cigarettes    Smokeless tobacco: Never Used   Substance and Sexual Activity    Alcohol use: Yes     Alcohol/week: 14.0 standard drinks     Types: 14 Cans of beer per week     Comment: 2 beers per night    Drug use: Yes     Types: Marijuana    Sexual activity: Yes     Partners: Female   Lifestyle    Physical activity:     Days per week: Not on file     Minutes per session: Not on file    Stress: Very much   Relationships    Social connections:     Talks on phone: Not on file     Gets together: Not on file     Attends Baptism service: Not on file     Active member of club or organization: Not on file     Attends meetings of clubs or organizations: Not on file     Relationship status: Not on file   Other Topics Concern    Not on file   Social History Narrative    Not on file       Hospitalization/Major Diagnostic Procedure:     Review of Systems     General/Constitutional:  Chills denies. Fatigue denies. Fever denies. Weight gain denies. Weight loss denies.    Respiratory:  Shortness of breath denies.    Cardiovascular:  Chest pain denies.    Gastrointestinal:  Constipation denies.  Diarrhea denies. Nausea denies. Vomiting denies.     Hematology:  Easy bruising denies. Prolonged bleeding denies.     Genitourinary:  Frequent urination denies. Pain in lower back denies. Painful urination denies.     Musculoskeletal:  See HPI for details    Skin:  Rash denies.    Neurologic:  Dizziness denies. Gait abnormalities denies. Seizures denies. Tingling/Numbess denies.    Psychiatric:  Anxiety denies. Depressed mood denies.     Objective:   Vital Signs:   Vitals:    03/02/20 1357   BP: (!) 150/80        Physical Exam      General Examination:     Constitutional: The patient is alert and oriented to lace person and time. Mood is pleasant.     Head/Face: Normal facial features normal eyebrows    Eyes: Normal extraocular motion bilaterally    Lungs: Respirations are equal and unlabored    Gait is coordinated.    Cardiovascular: There are no swelling or varicosities present.    Lymphatic: Negative for adenopathy    Skin: Normal    Neurological: Level of consciousness normal. Oriented to place person and time and situation    Psychiatric: Oriented to time place person and situation    Lumbar exam shows well-healed lumbar incision L3-S1 bilateral spinous muscle spasm no active extension marked room restriction of flexion and bending of the trunk.  Patient unable to stand erect Straight leg raising positive right side patellar Achilles reflexes symmetrical the knee range of motion normal subjective numbness in the  Nerve root distribution.  No edema adenopathy or varicosities noted.    XRAY Report/ Interpretation:  Lumbar MRI without contrast reviewed with the patient in the office today demonstrates previous interbody fusion which appears solid and stable and posterior lateral fusion with instrumentation at L5-S1.  There is a grade 1 anterolisthesis of L4 on L5 noted on the sagittal images.  Adjacent level degenerative disc disease at L4-5 with facet arthropathy causing foraminal stenosis right greater than  "left.    Incidentally noted he has a grade 1/2 spondylolisthesis of L4 on L5 on his lateral x-ray which is standing.  The MRI was done with him in a supine position. This indicates to me some instability at that level.    Lumbar lateral flexion-extension x-rays taken in the office today and reviewed the patient demonstrate      Assessment:       1. Lumbar stenosis without neurogenic claudication    2. Disc degeneration, lumbar    3. Lumbar facet arthropathy    4. Spondylolisthesis at L4-L5 level    5. Lumbar spine instability    6. History of lumbar fusion        Plan:       Josh was seen today for pain.    Diagnoses and all orders for this visit:    Lumbar stenosis without neurogenic claudication  Comments:  L4-5    Disc degeneration, lumbar  Comments:  L4-5  Orders:  -     X-Ray Lumbar Spine Flexion And Extension Only    Lumbar facet arthropathy    Spondylolisthesis at L4-L5 level  -     X-Ray Lumbar Spine Flexion And Extension Only    Lumbar spine instability  Comments:  L4-5    History of lumbar fusion  Comments:  L5-S1    Other orders  -     HYDROcodone-acetaminophen (NORCO) 7.5-325 mg per tablet; Take 1 tablet by mouth every 6 (six) hours as needed for Pain.         No follow-ups on file.  Pete Guzmán, physician's assistant served in the capacity as a "scribe" for this patient encounter  A "face to face" encounter occurred with Dr. Mena on this date  The treatment plan and medical decision making is outlined below:  Treatment options were discussed regards to the nature of the spinal condition conservative pain interventional and surgical options were discussed in detail and the probability of success of the separate treatment options was discussed in detail the patient expressed a clear understanding of the treatment options would regards to surgery the procedure risks benefits complications and outcomes were discussed.  No guarantees were given regards to surgical outcome.  The risk of retrograde " ejaculation or sexual dysfunction was discussed as possible consequence of the anterior surgical approach though I think that this is the best means of reducing and stabilizing his unstable motion segment  The technical aspects of the surgery were discussed in detail with the patient today. The patient was able to verbalize an understanding. The procedure risk benefits alternatives and possible complications of the surgical procedure was discussed including expected outcomes. No guarantees were given regards to outcomes. Consent forms were will be signed at a later date. All questions regarding the surgery itself were answered. The patient wishes to proceed with surgery and will be scheduled. The patient may require preoperative medical clearance.  Patient feels pains are intolerable due to his dynamic spondylolisthesis his surgery require a combined anterior posterior procedure with the help of Dr. Pete Fagan the service co surgery we will plan and anterior lumbar diskectomy and interbody fusion at L4-5 since spondylolisthesis is noted to nearly completely reduce with the patient in the supine position this a procedure that will be followed by a posterior L4 laminectomy removal of nonsegmental instrumentation L5-S1 posterior instrumentation L4 to the L5 and posterolateral fusion L4-5 thus a 360 degree in fusion  Patient is ready to proceed with surgery.     This note was created using Dragon voice recognition software that occasionally misinterpreted phrases or words.

## 2020-03-06 DIAGNOSIS — M53.2X6 LUMBAR SPINE INSTABILITY: ICD-10-CM

## 2020-03-06 DIAGNOSIS — M43.16 SPONDYLOLISTHESIS AT L4-L5 LEVEL: ICD-10-CM

## 2020-03-06 DIAGNOSIS — M48.062 LUMBAR STENOSIS WITH NEUROGENIC CLAUDICATION: ICD-10-CM

## 2020-03-06 DIAGNOSIS — M48.061 LUMBAR STENOSIS WITHOUT NEUROGENIC CLAUDICATION: ICD-10-CM

## 2020-03-06 DIAGNOSIS — Z98.1 HISTORY OF LUMBAR FUSION: ICD-10-CM

## 2020-03-06 DIAGNOSIS — M51.36 DISC DEGENERATION, LUMBAR: Primary | ICD-10-CM

## 2020-03-18 DIAGNOSIS — M48.061 LUMBAR STENOSIS WITHOUT NEUROGENIC CLAUDICATION: ICD-10-CM

## 2020-03-18 DIAGNOSIS — M53.2X6 LUMBAR SPINE INSTABILITY: Primary | ICD-10-CM

## 2020-03-18 RX ORDER — HYDROCODONE BITARTRATE AND ACETAMINOPHEN 7.5; 325 MG/1; MG/1
1 TABLET ORAL EVERY 6 HOURS PRN
Qty: 28 TABLET | Refills: 0 | Status: SHIPPED | OUTPATIENT
Start: 2020-03-18 | End: 2020-03-25

## 2020-03-18 NOTE — TELEPHONE ENCOUNTER
----- Message from Marisol Alva sent at 3/17/2020  4:43 PM CDT -----  Contact: Patient  Patient needs refill hydrocodone. Please call [phone]

## 2020-03-20 ENCOUNTER — TELEPHONE (OUTPATIENT)
Dept: RADIOLOGY | Facility: HOSPITAL | Age: 50
End: 2020-03-20

## 2020-03-20 NOTE — TELEPHONE ENCOUNTER
----- Message from Marie Rhodes sent at 3/20/2020  3:31 PM CDT -----  Due to concerns associated with Covid19 the radiology team is seeking to reschedule outpatient diagnostic exams between 3/21  4/21 that have been ordered prior to 3/19.  Pt above is scheduled for XR on 03/24/20 at [facility].  Please respond to this message if you believe it is safe to postpone this exam and the radiology team will reschedule and update you on the patients response.  If you have concerns that postponement is not safe (urgent or time sensitive diagnostic study), then reply and it will be performed as ordered.   If further discussion needed, please provide a contact number and a Radiologist will reach out to you shortly.

## 2020-03-23 ENCOUNTER — TELEPHONE (OUTPATIENT)
Dept: ORTHOPEDICS | Facility: CLINIC | Age: 50
End: 2020-03-23

## 2020-03-23 NOTE — TELEPHONE ENCOUNTER
I spoke with Andie in pre-op. She stated she will call and cancel his pre-op appts for tomorrow and give him further instructions.

## 2020-03-23 NOTE — TELEPHONE ENCOUNTER
----- Message from Marie Rhodes sent at 3/20/2020  3:31 PM CDT -----  Due to concerns associated with Covid19 the radiology team is seeking to reschedule outpatient diagnostic exams between 3/21  4/21 that have been ordered prior to 3/19.  Pt above is scheduled for XR on 03/24/20 at [facility].  Please respond to this message if you believe it is safe to postpone this exam and the radiology team will reschedule and update you on the patients response.  If you have concerns that postponement is not safe (urgent or time sensitive diagnostic study), then reply and it will be performed as ordered.   If further discussion needed, please provide a contact number and a Radiologist will reach out to you shortly.

## 2020-03-24 ENCOUNTER — HOSPITAL ENCOUNTER (OUTPATIENT)
Dept: PREADMISSION TESTING | Facility: HOSPITAL | Age: 50
Discharge: HOME | End: 2020-03-24

## 2020-03-25 ENCOUNTER — ANESTHESIA EVENT (OUTPATIENT)
Dept: SURGERY | Facility: HOSPITAL | Age: 50
DRG: 454 | End: 2020-03-25
Payer: MEDICARE

## 2020-03-28 ENCOUNTER — HOSPITAL ENCOUNTER (EMERGENCY)
Facility: HOSPITAL | Age: 50
Discharge: HOME | End: 2020-03-28
Attending: EMERGENCY MEDICINE
Payer: MEDICAID

## 2020-03-28 VITALS
WEIGHT: 165 LBS | DIASTOLIC BLOOD PRESSURE: 75 MMHG | SYSTOLIC BLOOD PRESSURE: 139 MMHG | OXYGEN SATURATION: 99 % | BODY MASS INDEX: 20.09 KG/M2 | HEART RATE: 102 BPM | HEIGHT: 76 IN | RESPIRATION RATE: 17 BRPM | TEMPERATURE: 98 F

## 2020-03-28 DIAGNOSIS — F43.0 ANXIETY IN ACUTE STRESS REACTION: Primary | ICD-10-CM

## 2020-03-28 DIAGNOSIS — F41.1 ANXIETY IN ACUTE STRESS REACTION: Primary | ICD-10-CM

## 2020-03-28 PROCEDURE — 99283 EMERGENCY DEPT VISIT LOW MDM: CPT

## 2020-03-28 RX ORDER — CLONAZEPAM 0.5 MG/1
0.5 TABLET ORAL 3 TIMES DAILY PRN
Qty: 15 TABLET | Refills: 0 | Status: SHIPPED | OUTPATIENT
Start: 2020-03-28 | End: 2021-03-22

## 2020-03-29 NOTE — ED PROVIDER NOTES
Encounter Date: 3/28/2020    SCRIBE #1 NOTE: I, Emiliano Nicole , am scribing for, and in the presence of, Dr. Hernandez.       History     Chief Complaint   Patient presents with    Panic Attack     Nervous about upcoming back surgery        Time seen by provider: 7:15 PM on 03/28/2020    Josh Coulter is a 49 y.o. male who presents to the ED with c/o anxiety along with a panic attack. The patient blames his anxiety of his surgery that's due in 3 days. He endorses that he feels like he is going to die. He notes sob but mostly when he is smoking. He denies any tingling, trouble swallowing, papillations, and heart attacks. PMHx includes hyponitremia and syncope. SHx includes spine surgery and a hernia repair. The patient is allergic to contrast media.     The history is provided by the patient.     Review of patient's allergies indicates:   Allergen Reactions    Contrast media Other (See Comments)     syncopy     Past Medical History:   Diagnosis Date    Hyponatremia     Syncope      Past Surgical History:   Procedure Laterality Date    HERNIA REPAIR      SPINE SURGERY  2009     Family History   Problem Relation Age of Onset    Hyperlipidemia Mother     Hypertension Mother     Hyperlipidemia Father     Hypertension Father      Social History     Tobacco Use    Smoking status: Current Every Day Smoker     Packs/day: 1.00     Years: 19.00     Pack years: 19.00     Types: Cigarettes    Smokeless tobacco: Never Used   Substance Use Topics    Alcohol use: Yes     Alcohol/week: 14.0 standard drinks     Types: 14 Cans of beer per week     Comment: 2 beers per night    Drug use: Yes     Types: Marijuana     Review of Systems   Constitutional: Negative for fever.   Respiratory: Positive for shortness of breath.    Genitourinary: Negative for flank pain.   Musculoskeletal: Negative for gait problem.   Neurological: Negative for weakness.        - tingling   Psychiatric/Behavioral: Negative for confusion. The  patient is nervous/anxious.        Physical Exam     Initial Vitals [03/28/20 1852]   BP Pulse Resp Temp SpO2   139/75 102 17 98.2 °F (36.8 °C) 99 %      MAP       --         Physical Exam    Nursing note and vitals reviewed.  Constitutional: He appears well-developed and well-nourished.   HENT:   Head: Normocephalic and atraumatic.   Eyes: Conjunctivae are normal.   Neck: Normal range of motion. Neck supple.   Cardiovascular: Normal rate, regular rhythm and normal heart sounds. Exam reveals no gallop and no friction rub.    No murmur heard.  Pulmonary/Chest: Breath sounds normal. No respiratory distress. He has no wheezes. He has no rhonchi. He has no rales.   Abdominal: Soft. He exhibits no distension. There is no tenderness.   Musculoskeletal: Normal range of motion.   Neurological: He is alert and oriented to person, place, and time.   Skin: Skin is warm and dry.   Psychiatric: He has a normal mood and affect.         ED Course   Procedures  Labs Reviewed - No data to display       Imaging Results    None          Medical Decision Making:   ED Management:  49-year-old male presents with recurrent panic attacks.  He denies any chest discomfort, shortness of breath or fever.  There are no symptoms to suggest COVID-19.  The absence of chest pain makes cardiac etiology unlikely.  He is prescribed Klonopin and is referred to his primary care physician for further management of his anxiety reaction.       APC / Resident Notes:   I, Dr. Guerrero Hernandez III, personally performed the services described in this documentation. All medical record entries made by the scribe were at my direction and in my presence.  I have reviewed the chart and agree that the record reflects my personal performance and is accurate and complete       Scribe Attestation:   Scribe #1: I performed the above scribed service and the documentation accurately describes the services I performed. I attest to the accuracy of the note.                           Clinical Impression:       ICD-10-CM ICD-9-CM   1. Anxiety in acute stress reaction F41.1 308.0    F43.0          Disposition:   Disposition: Discharged  Condition: Stable     ED Disposition Condition    Discharge Stable        ED Prescriptions     None        Follow-up Information    None                                    Guerrero Hernandez III, MD  03/28/20 1911

## 2020-03-30 DIAGNOSIS — M53.2X6 LUMBAR SPINE INSTABILITY: Primary | ICD-10-CM

## 2020-03-30 RX ORDER — HYDROCODONE BITARTRATE AND ACETAMINOPHEN 7.5; 325 MG/1; MG/1
1 TABLET ORAL EVERY 6 HOURS PRN
Qty: 16 TABLET | Refills: 0 | Status: SHIPPED | OUTPATIENT
Start: 2020-03-30 | End: 2020-04-01

## 2020-03-30 NOTE — OR NURSING
Results for JULES CHAPMAN (MRN [id number]) as of 3/30/2020 11:57   Ref. Range 1/8/2020 12:40   Sodium Latest Ref Range: 136 - 145 mmol/L 121 (L)     Informed .  Repeat will be done tomorrow morning and if still too low then may have to cancel patient.  Informed Kacy with Dr. Mena.

## 2020-03-30 NOTE — TELEPHONE ENCOUNTER
----- Message from Hedy Porter sent at 3/30/2020 10:50 AM CDT -----  Contact: Patient [phone] or [phone]  Requesting refill on his hydrocodone 7.5. Dr Mena

## 2020-03-31 ENCOUNTER — HOSPITAL ENCOUNTER (INPATIENT)
Facility: HOSPITAL | Age: 50
LOS: 2 days | Discharge: HOME | DRG: 454 | End: 2020-04-02
Attending: ORTHOPAEDIC SURGERY | Admitting: ORTHOPAEDIC SURGERY
Payer: MEDICARE

## 2020-03-31 ENCOUNTER — ANESTHESIA (OUTPATIENT)
Dept: SURGERY | Facility: HOSPITAL | Age: 50
DRG: 454 | End: 2020-03-31
Payer: MEDICARE

## 2020-03-31 ENCOUNTER — HOSPITAL ENCOUNTER (OUTPATIENT)
Dept: RADIOLOGY | Facility: HOSPITAL | Age: 50
Discharge: HOME | DRG: 454 | End: 2020-03-31
Attending: ORTHOPAEDIC SURGERY | Admitting: ORTHOPAEDIC SURGERY
Payer: MEDICAID

## 2020-03-31 DIAGNOSIS — Z98.1 HISTORY OF LUMBAR FUSION: ICD-10-CM

## 2020-03-31 DIAGNOSIS — M48.061 LUMBAR STENOSIS WITHOUT NEUROGENIC CLAUDICATION: ICD-10-CM

## 2020-03-31 DIAGNOSIS — M48.062 LUMBAR STENOSIS WITH NEUROGENIC CLAUDICATION: ICD-10-CM

## 2020-03-31 DIAGNOSIS — M43.16 SPONDYLOLISTHESIS OF LUMBAR REGION: Primary | ICD-10-CM

## 2020-03-31 DIAGNOSIS — M53.2X6 LUMBAR SPINE INSTABILITY: ICD-10-CM

## 2020-03-31 DIAGNOSIS — M43.16 SPONDYLOLISTHESIS AT L4-L5 LEVEL: ICD-10-CM

## 2020-03-31 DIAGNOSIS — M51.36 DISC DEGENERATION, LUMBAR: ICD-10-CM

## 2020-03-31 LAB
ABO + RH BLD: NORMAL
ALBUMIN SERPL BCP-MCNC: 4.1 G/DL (ref 3.5–5.2)
ALP SERPL-CCNC: 101 U/L (ref 55–135)
ALT SERPL W/O P-5'-P-CCNC: 48 U/L (ref 10–44)
ANION GAP SERPL CALC-SCNC: 11 MMOL/L (ref 8–16)
ANION GAP SERPL CALC-SCNC: 12 MMOL/L (ref 8–16)
AST SERPL-CCNC: 94 U/L (ref 10–40)
BASOPHILS # BLD AUTO: 0.03 K/UL (ref 0–0.2)
BASOPHILS # BLD AUTO: 0.03 K/UL (ref 0–0.2)
BASOPHILS NFR BLD: 0.3 % (ref 0–1.9)
BASOPHILS NFR BLD: 0.7 % (ref 0–1.9)
BILIRUB SERPL-MCNC: 0.6 MG/DL (ref 0.1–1)
BLD GP AB SCN CELLS X3 SERPL QL: NORMAL
BUN SERPL-MCNC: 3 MG/DL (ref 6–20)
BUN SERPL-MCNC: 3 MG/DL (ref 6–20)
CALCIUM SERPL-MCNC: 7.5 MG/DL (ref 8.7–10.5)
CALCIUM SERPL-MCNC: 8.9 MG/DL (ref 8.7–10.5)
CHLORIDE SERPL-SCNC: 82 MMOL/L (ref 95–110)
CHLORIDE SERPL-SCNC: 88 MMOL/L (ref 95–110)
CO2 SERPL-SCNC: 20 MMOL/L (ref 23–29)
CO2 SERPL-SCNC: 24 MMOL/L (ref 23–29)
CREAT SERPL-MCNC: 0.7 MG/DL (ref 0.5–1.4)
CREAT SERPL-MCNC: 0.8 MG/DL (ref 0.5–1.4)
DIFFERENTIAL METHOD: ABNORMAL
DIFFERENTIAL METHOD: ABNORMAL
EOSINOPHIL # BLD AUTO: 0.1 K/UL (ref 0–0.5)
EOSINOPHIL # BLD AUTO: 0.2 K/UL (ref 0–0.5)
EOSINOPHIL NFR BLD: 1.6 % (ref 0–8)
EOSINOPHIL NFR BLD: 2.2 % (ref 0–8)
ERYTHROCYTE [DISTWIDTH] IN BLOOD BY AUTOMATED COUNT: 11.8 % (ref 11.5–14.5)
ERYTHROCYTE [DISTWIDTH] IN BLOOD BY AUTOMATED COUNT: 12 % (ref 11.5–14.5)
EST. GFR  (AFRICAN AMERICAN): >60 ML/MIN/1.73 M^2
EST. GFR  (AFRICAN AMERICAN): >60 ML/MIN/1.73 M^2
EST. GFR  (NON AFRICAN AMERICAN): >60 ML/MIN/1.73 M^2
EST. GFR  (NON AFRICAN AMERICAN): >60 ML/MIN/1.73 M^2
GLUCOSE SERPL-MCNC: 82 MG/DL (ref 70–110)
GLUCOSE SERPL-MCNC: 99 MG/DL (ref 70–110)
HCT VFR BLD AUTO: 34.4 % (ref 40–54)
HCT VFR BLD AUTO: 37.2 % (ref 40–54)
HGB BLD-MCNC: 12.2 G/DL (ref 14–18)
HGB BLD-MCNC: 13.6 G/DL (ref 14–18)
IMM GRANULOCYTES # BLD AUTO: 0.01 K/UL (ref 0–0.04)
IMM GRANULOCYTES # BLD AUTO: 0.04 K/UL (ref 0–0.04)
IMM GRANULOCYTES NFR BLD AUTO: 0.2 % (ref 0–0.5)
IMM GRANULOCYTES NFR BLD AUTO: 0.4 % (ref 0–0.5)
LYMPHOCYTES # BLD AUTO: 0.8 K/UL (ref 1–4.8)
LYMPHOCYTES # BLD AUTO: 1.4 K/UL (ref 1–4.8)
LYMPHOCYTES NFR BLD: 33.3 % (ref 18–48)
LYMPHOCYTES NFR BLD: 8.5 % (ref 18–48)
MCH RBC QN AUTO: 34.2 PG (ref 27–31)
MCH RBC QN AUTO: 34.5 PG (ref 27–31)
MCHC RBC AUTO-ENTMCNC: 35.5 G/DL (ref 32–36)
MCHC RBC AUTO-ENTMCNC: 36.6 G/DL (ref 32–36)
MCV RBC AUTO: 94 FL (ref 82–98)
MCV RBC AUTO: 97 FL (ref 82–98)
MONOCYTES # BLD AUTO: 0.5 K/UL (ref 0.3–1)
MONOCYTES # BLD AUTO: 0.5 K/UL (ref 0.3–1)
MONOCYTES NFR BLD: 11.8 % (ref 4–15)
MONOCYTES NFR BLD: 5.7 % (ref 4–15)
NEUTROPHILS # BLD AUTO: 2.3 K/UL (ref 1.8–7.7)
NEUTROPHILS # BLD AUTO: 7.8 K/UL (ref 1.8–7.7)
NEUTROPHILS NFR BLD: 52.4 % (ref 38–73)
NEUTROPHILS NFR BLD: 82.9 % (ref 38–73)
NRBC BLD-RTO: 0 /100 WBC
NRBC BLD-RTO: 0 /100 WBC
PLATELET # BLD AUTO: 131 K/UL (ref 150–350)
PLATELET # BLD AUTO: 92 K/UL (ref 150–350)
PMV BLD AUTO: 9.7 FL (ref 9.2–12.9)
PMV BLD AUTO: 9.9 FL (ref 9.2–12.9)
POTASSIUM SERPL-SCNC: 3.7 MMOL/L (ref 3.5–5.1)
POTASSIUM SERPL-SCNC: 4.1 MMOL/L (ref 3.5–5.1)
PROT SERPL-MCNC: 7.2 G/DL (ref 6–8.4)
RBC # BLD AUTO: 3.54 M/UL (ref 4.6–6.2)
RBC # BLD AUTO: 3.98 M/UL (ref 4.6–6.2)
SODIUM SERPL-SCNC: 117 MMOL/L (ref 136–145)
SODIUM SERPL-SCNC: 120 MMOL/L (ref 136–145)
WBC # BLD AUTO: 4.32 K/UL (ref 3.9–12.7)
WBC # BLD AUTO: 9.38 K/UL (ref 3.9–12.7)

## 2020-03-31 PROCEDURE — 63600175 PHARM REV CODE 636 W HCPCS: Performed by: NURSE ANESTHETIST, CERTIFIED REGISTERED

## 2020-03-31 PROCEDURE — 99900104 DSU ONLY-NO CHARGE-EA ADD'L HR (STAT): Performed by: ORTHOPAEDIC SURGERY

## 2020-03-31 PROCEDURE — 27201423 OPTIME MED/SURG SUP & DEVICES STERILE SUPPLY: Performed by: ORTHOPAEDIC SURGERY

## 2020-03-31 PROCEDURE — C1729 CATH, DRAINAGE: HCPCS | Performed by: ORTHOPAEDIC SURGERY

## 2020-03-31 PROCEDURE — P9045 ALBUMIN (HUMAN), 5%, 250 ML: HCPCS | Performed by: NURSE ANESTHETIST, CERTIFIED REGISTERED

## 2020-03-31 PROCEDURE — 25000003 PHARM REV CODE 250: Performed by: PHYSICIAN ASSISTANT

## 2020-03-31 PROCEDURE — 12000002 HC ACUTE/MED SURGE SEMI-PRIVATE ROOM

## 2020-03-31 PROCEDURE — 86901 BLOOD TYPING SEROLOGIC RH(D): CPT

## 2020-03-31 PROCEDURE — 63600175 PHARM REV CODE 636 W HCPCS: Performed by: PHYSICIAN ASSISTANT

## 2020-03-31 PROCEDURE — 99900103 DSU ONLY-NO CHARGE-INITIAL HR (STAT): Performed by: ORTHOPAEDIC SURGERY

## 2020-03-31 PROCEDURE — 85025 COMPLETE CBC W/AUTO DIFF WBC: CPT

## 2020-03-31 PROCEDURE — 27800903 OPTIME MED/SURG SUP & DEVICES OTHER IMPLANTS: Performed by: ORTHOPAEDIC SURGERY

## 2020-03-31 PROCEDURE — 63600175 PHARM REV CODE 636 W HCPCS: Performed by: ORTHOPAEDIC SURGERY

## 2020-03-31 PROCEDURE — 36415 COLL VENOUS BLD VENIPUNCTURE: CPT

## 2020-03-31 PROCEDURE — 71046 X-RAY EXAM CHEST 2 VIEWS: CPT | Mod: TC,FY

## 2020-03-31 PROCEDURE — 37000009 HC ANESTHESIA EA ADD 15 MINS: Performed by: ORTHOPAEDIC SURGERY

## 2020-03-31 PROCEDURE — C1713 ANCHOR/SCREW BN/BN,TIS/BN: HCPCS | Performed by: ORTHOPAEDIC SURGERY

## 2020-03-31 PROCEDURE — 71046 X-RAY EXAM CHEST 2 VIEWS: CPT | Mod: 26,,, | Performed by: RADIOLOGY

## 2020-03-31 PROCEDURE — 80048 BASIC METABOLIC PNL TOTAL CA: CPT

## 2020-03-31 PROCEDURE — D9220A PRA ANESTHESIA: ICD-10-PCS | Mod: ANES,,, | Performed by: ANESTHESIOLOGY

## 2020-03-31 PROCEDURE — 71000033 HC RECOVERY, INTIAL HOUR: Performed by: ORTHOPAEDIC SURGERY

## 2020-03-31 PROCEDURE — 36000711: Performed by: ORTHOPAEDIC SURGERY

## 2020-03-31 PROCEDURE — 25000003 PHARM REV CODE 250: Performed by: NURSE ANESTHETIST, CERTIFIED REGISTERED

## 2020-03-31 PROCEDURE — 25000003 PHARM REV CODE 250: Performed by: ANESTHESIOLOGY

## 2020-03-31 PROCEDURE — 80053 COMPREHEN METABOLIC PANEL: CPT

## 2020-03-31 PROCEDURE — 93005 ELECTROCARDIOGRAM TRACING: CPT

## 2020-03-31 PROCEDURE — 36000710: Performed by: ORTHOPAEDIC SURGERY

## 2020-03-31 PROCEDURE — D9220A PRA ANESTHESIA: ICD-10-PCS | Mod: CRNA,,, | Performed by: NURSE ANESTHETIST, CERTIFIED REGISTERED

## 2020-03-31 PROCEDURE — D9220A PRA ANESTHESIA: Mod: ANES,,, | Performed by: ANESTHESIOLOGY

## 2020-03-31 PROCEDURE — 37000008 HC ANESTHESIA 1ST 15 MINUTES: Performed by: ORTHOPAEDIC SURGERY

## 2020-03-31 PROCEDURE — D9220A PRA ANESTHESIA: Mod: CRNA,,, | Performed by: NURSE ANESTHETIST, CERTIFIED REGISTERED

## 2020-03-31 PROCEDURE — 25000003 PHARM REV CODE 250: Performed by: ORTHOPAEDIC SURGERY

## 2020-03-31 PROCEDURE — 27000221 HC OXYGEN, UP TO 24 HOURS

## 2020-03-31 PROCEDURE — 63600175 PHARM REV CODE 636 W HCPCS: Performed by: ANESTHESIOLOGY

## 2020-03-31 PROCEDURE — 71000039 HC RECOVERY, EACH ADD'L HOUR: Performed by: ORTHOPAEDIC SURGERY

## 2020-03-31 PROCEDURE — 71046 XR CHEST PA AND LATERAL: ICD-10-PCS | Mod: 26,,, | Performed by: RADIOLOGY

## 2020-03-31 DEVICE — ALLOGRAFT REVITA 4X6CM: Type: IMPLANTABLE DEVICE | Site: SPINE LUMBAR | Status: FUNCTIONAL

## 2020-03-31 DEVICE — IMPLANTABLE DEVICE: Type: IMPLANTABLE DEVICE | Site: SPINE LUMBAR | Status: FUNCTIONAL

## 2020-03-31 DEVICE — SET SCREW HORIZON SOLERA 5.5-6: Type: IMPLANTABLE DEVICE | Site: SPINE LUMBAR | Status: FUNCTIONAL

## 2020-03-31 DEVICE — KIT MED BONE INFUSE: Type: IMPLANTABLE DEVICE | Site: SPINE LUMBAR | Status: FUNCTIONAL

## 2020-03-31 DEVICE — ROD CD HORIZON SOLERA 5.5-6X40: Type: IMPLANTABLE DEVICE | Site: SPINE LUMBAR | Status: FUNCTIONAL

## 2020-03-31 RX ORDER — HYDROMORPHONE HYDROCHLORIDE 2 MG/ML
0.2 INJECTION, SOLUTION INTRAMUSCULAR; INTRAVENOUS; SUBCUTANEOUS EVERY 5 MIN PRN
Status: DISCONTINUED | OUTPATIENT
Start: 2020-03-31 | End: 2020-03-31 | Stop reason: HOSPADM

## 2020-03-31 RX ORDER — DIPHENHYDRAMINE HYDROCHLORIDE 50 MG/ML
12.5 INJECTION INTRAMUSCULAR; INTRAVENOUS EVERY 4 HOURS PRN
Status: DISCONTINUED | OUTPATIENT
Start: 2020-03-31 | End: 2020-04-02 | Stop reason: HOSPADM

## 2020-03-31 RX ORDER — OXYCODONE HYDROCHLORIDE 5 MG/1
5 TABLET ORAL EVERY 4 HOURS PRN
Status: DISCONTINUED | OUTPATIENT
Start: 2020-03-31 | End: 2020-04-01

## 2020-03-31 RX ORDER — CEFAZOLIN SODIUM 2 G/50ML
2 SOLUTION INTRAVENOUS
Status: COMPLETED | OUTPATIENT
Start: 2020-03-31 | End: 2020-03-31

## 2020-03-31 RX ORDER — ACETAMINOPHEN 10 MG/ML
INJECTION, SOLUTION INTRAVENOUS
Status: DISCONTINUED | OUTPATIENT
Start: 2020-03-31 | End: 2020-03-31

## 2020-03-31 RX ORDER — LIDOCAINE HCL/PF 100 MG/5ML
SYRINGE (ML) INTRAVENOUS
Status: DISCONTINUED | OUTPATIENT
Start: 2020-03-31 | End: 2020-03-31

## 2020-03-31 RX ORDER — ONDANSETRON 4 MG/1
8 TABLET, ORALLY DISINTEGRATING ORAL EVERY 8 HOURS PRN
Status: DISCONTINUED | OUTPATIENT
Start: 2020-03-31 | End: 2020-04-02 | Stop reason: HOSPADM

## 2020-03-31 RX ORDER — LIDOCAINE HYDROCHLORIDE 10 MG/ML
1 INJECTION, SOLUTION EPIDURAL; INFILTRATION; INTRACAUDAL; PERINEURAL ONCE
Status: COMPLETED | OUTPATIENT
Start: 2020-03-31 | End: 2020-03-31

## 2020-03-31 RX ORDER — BISACODYL 10 MG
10 SUPPOSITORY, RECTAL RECTAL DAILY
Status: DISCONTINUED | OUTPATIENT
Start: 2020-04-01 | End: 2020-04-02 | Stop reason: HOSPADM

## 2020-03-31 RX ORDER — MUPIROCIN 20 MG/G
1 OINTMENT TOPICAL 2 TIMES DAILY
Status: DISCONTINUED | OUTPATIENT
Start: 2020-03-31 | End: 2020-04-02 | Stop reason: HOSPADM

## 2020-03-31 RX ORDER — SODIUM CHLORIDE, SODIUM LACTATE, POTASSIUM CHLORIDE, CALCIUM CHLORIDE 600; 310; 30; 20 MG/100ML; MG/100ML; MG/100ML; MG/100ML
INJECTION, SOLUTION INTRAVENOUS CONTINUOUS
Status: DISCONTINUED | OUTPATIENT
Start: 2020-03-31 | End: 2020-04-01

## 2020-03-31 RX ORDER — SODIUM CHLORIDE 0.9 % (FLUSH) 0.9 %
3 SYRINGE (ML) INJECTION EVERY 8 HOURS
Status: DISCONTINUED | OUTPATIENT
Start: 2020-03-31 | End: 2020-03-31 | Stop reason: HOSPADM

## 2020-03-31 RX ORDER — GLYCOPYRROLATE 0.2 MG/ML
INJECTION INTRAMUSCULAR; INTRAVENOUS
Status: DISCONTINUED | OUTPATIENT
Start: 2020-03-31 | End: 2020-03-31

## 2020-03-31 RX ORDER — NEOSTIGMINE METHYLSULFATE 1 MG/ML
INJECTION, SOLUTION INTRAVENOUS
Status: DISCONTINUED | OUTPATIENT
Start: 2020-03-31 | End: 2020-03-31

## 2020-03-31 RX ORDER — NALOXONE HCL 0.4 MG/ML
0.04 VIAL (ML) INJECTION CONTINUOUS PRN
Status: ACTIVE | OUTPATIENT
Start: 2020-03-31 | End: 2020-04-01

## 2020-03-31 RX ORDER — ONDANSETRON 2 MG/ML
8 INJECTION INTRAMUSCULAR; INTRAVENOUS EVERY 6 HOURS PRN
Status: DISCONTINUED | OUTPATIENT
Start: 2020-03-31 | End: 2020-04-02 | Stop reason: HOSPADM

## 2020-03-31 RX ORDER — OXYCODONE HYDROCHLORIDE 5 MG/1
15 TABLET ORAL EVERY 4 HOURS PRN
Status: DISCONTINUED | OUTPATIENT
Start: 2020-03-31 | End: 2020-04-01

## 2020-03-31 RX ORDER — BACITRACIN 50000 [IU]/1
INJECTION, POWDER, FOR SOLUTION INTRAMUSCULAR
Status: DISCONTINUED | OUTPATIENT
Start: 2020-03-31 | End: 2020-03-31 | Stop reason: HOSPADM

## 2020-03-31 RX ORDER — MAG HYDROX/ALUMINUM HYD/SIMETH 200-200-20
30 SUSPENSION, ORAL (FINAL DOSE FORM) ORAL EVERY 4 HOURS PRN
Status: DISCONTINUED | OUTPATIENT
Start: 2020-03-31 | End: 2020-04-02 | Stop reason: HOSPADM

## 2020-03-31 RX ORDER — DOCUSATE SODIUM 100 MG/1
100 CAPSULE, LIQUID FILLED ORAL DAILY
Status: DISCONTINUED | OUTPATIENT
Start: 2020-04-01 | End: 2020-04-02 | Stop reason: HOSPADM

## 2020-03-31 RX ORDER — NALOXONE HCL 0.4 MG/ML
0.02 VIAL (ML) INJECTION
Status: DISCONTINUED | OUTPATIENT
Start: 2020-03-31 | End: 2020-03-31 | Stop reason: SDUPTHER

## 2020-03-31 RX ORDER — MIDAZOLAM HYDROCHLORIDE 1 MG/ML
INJECTION, SOLUTION INTRAMUSCULAR; INTRAVENOUS
Status: DISCONTINUED | OUTPATIENT
Start: 2020-03-31 | End: 2020-03-31

## 2020-03-31 RX ORDER — HYDROMORPHONE HYDROCHLORIDE 2 MG/ML
2 INJECTION, SOLUTION INTRAMUSCULAR; INTRAVENOUS; SUBCUTANEOUS
Status: DISCONTINUED | OUTPATIENT
Start: 2020-03-31 | End: 2020-04-02

## 2020-03-31 RX ORDER — AMOXICILLIN 250 MG
2 CAPSULE ORAL NIGHTLY PRN
Status: DISCONTINUED | OUTPATIENT
Start: 2020-03-31 | End: 2020-04-02 | Stop reason: HOSPADM

## 2020-03-31 RX ORDER — CYCLOBENZAPRINE HCL 5 MG
10 TABLET ORAL 3 TIMES DAILY PRN
Status: DISCONTINUED | OUTPATIENT
Start: 2020-03-31 | End: 2020-04-02 | Stop reason: HOSPADM

## 2020-03-31 RX ORDER — HYDROMORPHONE HYDROCHLORIDE 2 MG/ML
1 INJECTION, SOLUTION INTRAMUSCULAR; INTRAVENOUS; SUBCUTANEOUS
Status: DISCONTINUED | OUTPATIENT
Start: 2020-03-31 | End: 2020-04-02

## 2020-03-31 RX ORDER — HYDROMORPHONE HCL IN 0.9% NACL 6 MG/30 ML
PATIENT CONTROLLED ANALGESIA SYRINGE INTRAVENOUS CONTINUOUS
Status: DISCONTINUED | OUTPATIENT
Start: 2020-03-31 | End: 2020-04-01

## 2020-03-31 RX ORDER — CLONAZEPAM 0.5 MG/1
0.5 TABLET ORAL 2 TIMES DAILY PRN
Status: DISCONTINUED | OUTPATIENT
Start: 2020-03-31 | End: 2020-04-02 | Stop reason: HOSPADM

## 2020-03-31 RX ORDER — OXYCODONE HYDROCHLORIDE 5 MG/1
10 TABLET ORAL EVERY 4 HOURS PRN
Status: DISCONTINUED | OUTPATIENT
Start: 2020-03-31 | End: 2020-04-01

## 2020-03-31 RX ORDER — ACETAMINOPHEN 325 MG/1
650 TABLET ORAL EVERY 6 HOURS PRN
Status: DISCONTINUED | OUTPATIENT
Start: 2020-04-01 | End: 2020-04-02 | Stop reason: HOSPADM

## 2020-03-31 RX ORDER — ONDANSETRON 2 MG/ML
4 INJECTION INTRAMUSCULAR; INTRAVENOUS ONCE AS NEEDED
Status: DISCONTINUED | OUTPATIENT
Start: 2020-03-31 | End: 2020-03-31 | Stop reason: HOSPADM

## 2020-03-31 RX ORDER — METOCLOPRAMIDE HYDROCHLORIDE 5 MG/ML
10 INJECTION INTRAMUSCULAR; INTRAVENOUS EVERY 8 HOURS
Status: COMPLETED | OUTPATIENT
Start: 2020-04-01 | End: 2020-04-01

## 2020-03-31 RX ORDER — HYDROMORPHONE HCL IN 0.9% NACL 6 MG/30 ML
PATIENT CONTROLLED ANALGESIA SYRINGE INTRAVENOUS CONTINUOUS
Status: DISCONTINUED | OUTPATIENT
Start: 2020-03-31 | End: 2020-03-31

## 2020-03-31 RX ORDER — VECURONIUM BROMIDE FOR INJECTION 1 MG/ML
INJECTION, POWDER, LYOPHILIZED, FOR SOLUTION INTRAVENOUS
Status: DISCONTINUED | OUTPATIENT
Start: 2020-03-31 | End: 2020-03-31

## 2020-03-31 RX ORDER — PROPOFOL 10 MG/ML
VIAL (ML) INTRAVENOUS CONTINUOUS PRN
Status: DISCONTINUED | OUTPATIENT
Start: 2020-03-31 | End: 2020-03-31

## 2020-03-31 RX ORDER — ROCURONIUM BROMIDE 10 MG/ML
INJECTION, SOLUTION INTRAVENOUS
Status: DISCONTINUED | OUTPATIENT
Start: 2020-03-31 | End: 2020-03-31

## 2020-03-31 RX ORDER — FENTANYL CITRATE 50 UG/ML
INJECTION, SOLUTION INTRAMUSCULAR; INTRAVENOUS
Status: DISCONTINUED | OUTPATIENT
Start: 2020-03-31 | End: 2020-03-31

## 2020-03-31 RX ORDER — DIPHENHYDRAMINE HCL 25 MG
50 CAPSULE ORAL EVERY 6 HOURS PRN
Status: DISCONTINUED | OUTPATIENT
Start: 2020-03-31 | End: 2020-04-02 | Stop reason: HOSPADM

## 2020-03-31 RX ORDER — ONDANSETRON HYDROCHLORIDE 2 MG/ML
INJECTION, SOLUTION INTRAMUSCULAR; INTRAVENOUS
Status: DISCONTINUED | OUTPATIENT
Start: 2020-03-31 | End: 2020-03-31

## 2020-03-31 RX ORDER — DEXAMETHASONE SODIUM PHOSPHATE 4 MG/ML
INJECTION, SOLUTION INTRA-ARTICULAR; INTRALESIONAL; INTRAMUSCULAR; INTRAVENOUS; SOFT TISSUE
Status: DISCONTINUED | OUTPATIENT
Start: 2020-03-31 | End: 2020-03-31

## 2020-03-31 RX ORDER — SUCCINYLCHOLINE CHLORIDE 20 MG/ML
INJECTION INTRAMUSCULAR; INTRAVENOUS
Status: DISCONTINUED | OUTPATIENT
Start: 2020-03-31 | End: 2020-03-31

## 2020-03-31 RX ORDER — ACETAMINOPHEN 10 MG/ML
1000 INJECTION, SOLUTION INTRAVENOUS EVERY 8 HOURS
Status: COMPLETED | OUTPATIENT
Start: 2020-03-31 | End: 2020-04-01

## 2020-03-31 RX ORDER — PROPOFOL 10 MG/ML
VIAL (ML) INTRAVENOUS
Status: DISCONTINUED | OUTPATIENT
Start: 2020-03-31 | End: 2020-03-31

## 2020-03-31 RX ORDER — ALBUMIN HUMAN 50 G/1000ML
SOLUTION INTRAVENOUS CONTINUOUS PRN
Status: DISCONTINUED | OUTPATIENT
Start: 2020-03-31 | End: 2020-03-31

## 2020-03-31 RX ORDER — CEFAZOLIN SODIUM 2 G/50ML
2 SOLUTION INTRAVENOUS
Status: COMPLETED | OUTPATIENT
Start: 2020-03-31 | End: 2020-04-01

## 2020-03-31 RX ORDER — OXYCODONE HYDROCHLORIDE 5 MG/1
5 TABLET ORAL ONCE AS NEEDED
Status: DISCONTINUED | OUTPATIENT
Start: 2020-03-31 | End: 2020-03-31 | Stop reason: HOSPADM

## 2020-03-31 RX ORDER — SODIUM CHLORIDE, SODIUM LACTATE, POTASSIUM CHLORIDE, CALCIUM CHLORIDE 600; 310; 30; 20 MG/100ML; MG/100ML; MG/100ML; MG/100ML
10 INJECTION, SOLUTION INTRAVENOUS CONTINUOUS
Status: DISCONTINUED | OUTPATIENT
Start: 2020-03-31 | End: 2020-04-01

## 2020-03-31 RX ORDER — KETAMINE HYDROCHLORIDE 100 MG/ML
INJECTION, SOLUTION INTRAMUSCULAR; INTRAVENOUS
Status: DISCONTINUED | OUTPATIENT
Start: 2020-03-31 | End: 2020-03-31

## 2020-03-31 RX ORDER — HYDROMORPHONE HYDROCHLORIDE 2 MG/ML
INJECTION, SOLUTION INTRAMUSCULAR; INTRAVENOUS; SUBCUTANEOUS
Status: DISCONTINUED | OUTPATIENT
Start: 2020-03-31 | End: 2020-03-31

## 2020-03-31 RX ADMIN — LIDOCAINE HYDROCHLORIDE 100 MG: 20 INJECTION, SOLUTION INTRAVENOUS at 09:03

## 2020-03-31 RX ADMIN — KETAMINE HYDROCHLORIDE 25 MG: 100 INJECTION, SOLUTION, CONCENTRATE INTRAMUSCULAR; INTRAVENOUS at 10:03

## 2020-03-31 RX ADMIN — ROCURONIUM BROMIDE 5 MG: 10 INJECTION, SOLUTION INTRAVENOUS at 09:03

## 2020-03-31 RX ADMIN — CEFAZOLIN SODIUM 2 G: 2 SOLUTION INTRAVENOUS at 05:03

## 2020-03-31 RX ADMIN — HYDROMORPHONE HYDROCHLORIDE 0.5 MG: 2 INJECTION, SOLUTION INTRAMUSCULAR; INTRAVENOUS; SUBCUTANEOUS at 12:03

## 2020-03-31 RX ADMIN — CEFAZOLIN SODIUM 2 G: 2 SOLUTION INTRAVENOUS at 10:03

## 2020-03-31 RX ADMIN — GLYCOPYRROLATE 0.4 MG: 0.2 INJECTION, SOLUTION INTRAMUSCULAR; INTRAVENOUS at 02:03

## 2020-03-31 RX ADMIN — HYDROMORPHONE HYDROCHLORIDE 0.5 MG: 2 INJECTION, SOLUTION INTRAMUSCULAR; INTRAVENOUS; SUBCUTANEOUS at 02:03

## 2020-03-31 RX ADMIN — PROPOFOL 50 MCG/KG/MIN: 10 INJECTION, EMULSION INTRAVENOUS at 10:03

## 2020-03-31 RX ADMIN — HYDROMORPHONE HYDROCHLORIDE 0.5 MG: 2 INJECTION, SOLUTION INTRAMUSCULAR; INTRAVENOUS; SUBCUTANEOUS at 01:03

## 2020-03-31 RX ADMIN — ACETAMINOPHEN 1000 MG: 10 INJECTION, SOLUTION INTRAVENOUS at 09:03

## 2020-03-31 RX ADMIN — DEXAMETHASONE SODIUM PHOSPHATE 4 MG: 4 INJECTION, SOLUTION INTRAMUSCULAR; INTRAVENOUS at 09:03

## 2020-03-31 RX ADMIN — PHENYLEPHRINE HYDROCHLORIDE 0.2 MCG: 10 INJECTION INTRAVENOUS at 10:03

## 2020-03-31 RX ADMIN — PROPOFOL 150 MG: 10 INJECTION, EMULSION INTRAVENOUS at 09:03

## 2020-03-31 RX ADMIN — Medication: at 03:03

## 2020-03-31 RX ADMIN — HYDROMORPHONE HYDROCHLORIDE 0.5 MG: 2 INJECTION, SOLUTION INTRAMUSCULAR; INTRAVENOUS; SUBCUTANEOUS at 10:03

## 2020-03-31 RX ADMIN — MIDAZOLAM 2 MG: 1 INJECTION INTRAMUSCULAR; INTRAVENOUS at 09:03

## 2020-03-31 RX ADMIN — SODIUM CHLORIDE, SODIUM GLUCONATE, SODIUM ACETATE, POTASSIUM CHLORIDE, MAGNESIUM CHLORIDE, SODIUM PHOSPHATE, DIBASIC, AND POTASSIUM PHOSPHATE: .53; .5; .37; .037; .03; .012; .00082 INJECTION, SOLUTION INTRAVENOUS at 10:03

## 2020-03-31 RX ADMIN — MUPIROCIN 1 G: 20 OINTMENT TOPICAL at 09:03

## 2020-03-31 RX ADMIN — ALBUMIN (HUMAN): 12.5 SOLUTION INTRAVENOUS at 01:03

## 2020-03-31 RX ADMIN — ONDANSETRON 4 MG: 2 INJECTION, SOLUTION INTRAMUSCULAR; INTRAVENOUS at 09:03

## 2020-03-31 RX ADMIN — FENTANYL CITRATE 100 MCG: 50 INJECTION, SOLUTION INTRAMUSCULAR; INTRAVENOUS at 09:03

## 2020-03-31 RX ADMIN — KETAMINE HYDROCHLORIDE 25 MG: 100 INJECTION, SOLUTION, CONCENTRATE INTRAMUSCULAR; INTRAVENOUS at 11:03

## 2020-03-31 RX ADMIN — VECURONIUM BROMIDE 4 MG: 10 INJECTION, POWDER, LYOPHILIZED, FOR SOLUTION INTRAVENOUS at 10:03

## 2020-03-31 RX ADMIN — SUCCINYLCHOLINE CHLORIDE 120 MG: 20 INJECTION, SOLUTION INTRAMUSCULAR; INTRAVENOUS; PARENTERAL at 09:03

## 2020-03-31 RX ADMIN — LIDOCAINE HYDROCHLORIDE 10 MG: 10 INJECTION, SOLUTION EPIDURAL; INFILTRATION; INTRACAUDAL; PERINEURAL at 08:03

## 2020-03-31 RX ADMIN — SODIUM CHLORIDE, SODIUM LACTATE, POTASSIUM CHLORIDE, AND CALCIUM CHLORIDE: .6; .31; .03; .02 INJECTION, SOLUTION INTRAVENOUS at 03:03

## 2020-03-31 RX ADMIN — SODIUM CHLORIDE, SODIUM GLUCONATE, SODIUM ACETATE, POTASSIUM CHLORIDE, MAGNESIUM CHLORIDE, SODIUM PHOSPHATE, DIBASIC, AND POTASSIUM PHOSPHATE 10 ML: .53; .5; .37; .037; .03; .012; .00082 INJECTION, SOLUTION INTRAVENOUS at 08:03

## 2020-03-31 RX ADMIN — SODIUM CHLORIDE, SODIUM GLUCONATE, SODIUM ACETATE, POTASSIUM CHLORIDE, MAGNESIUM CHLORIDE, SODIUM PHOSPHATE, DIBASIC, AND POTASSIUM PHOSPHATE: .53; .5; .37; .037; .03; .012; .00082 INJECTION, SOLUTION INTRAVENOUS at 01:03

## 2020-03-31 RX ADMIN — ACETAMINOPHEN 1000 MG: 10 INJECTION, SOLUTION INTRAVENOUS at 10:03

## 2020-03-31 RX ADMIN — NEOSTIGMINE METHYLSULFATE 3 MG: 1 INJECTION INTRAVENOUS at 02:03

## 2020-03-31 NOTE — TRANSFER OF CARE
"Anesthesia Transfer of Care Note    Patient: Josh Coulter    Procedure(s) Performed: Procedure(s) (LRB):  LAMINECTOMY, SPINE, LUMBAR, FOR DECOMPRESSION L4 (Bilateral)  FUSION, SPINE, WITH INSTRUMENTATION L4-5 (N/A)  FUSION, SPINE, LUMBAR, ALIF L4-5 (N/A)  BONE GRAFT (N/A)    Patient location: PACU    Anesthesia Type: general    Transport from OR: Transported from OR on 2-3 L/min O2 by NC with adequate spontaneous ventilation    Post pain: adequate analgesia    Post assessment: no apparent anesthetic complications and tolerated procedure well    Post vital signs: stable    Level of consciousness: awake, alert and oriented    Nausea/Vomiting: no nausea/vomiting    Complications: none    Transfer of care protocol was followed      Last vitals:   Visit Vitals  /85   Pulse 93   Temp 36.2 °C (97.2 °F) (Temporal)   Resp 16   Ht 6' 4" (1.93 m)   Wt 74.8 kg (165 lb)   SpO2 99%   BMI 20.08 kg/m²     "

## 2020-03-31 NOTE — OP NOTE
Preop diagnosis-lumbar disc disease  Postop diagnosis-same  Operation-left retroperitoneal exploration with anterior diskectomy and cage fusion L4-L5  General surgeon-Gracia Santos  Anesthesia-general  Drains-none  Estimated blood loss-50 cc     The patient is brought to the operating room and given a general endotracheal anesthetic.  Intravenous antibiotics were given.  A roll was placed beneath the spine and then the entire abdomen and chest and flank were prepped and draped into a sterile field using fluoroscopy an incision is made in the left lower quadrant and extending toward the left iliac crest is deepened through the subcutaneous tissues.  The fascia of the rectus is incised the rectus muscle was mobilized medially the external oblique internal oblique transversus abdominus and posterior rectus are incised in the retroperitoneum is entered.  Dissection was carried up and over the shoulders care was taken to preserve the genitofemoral nerve.  The aorta iliac artery iliac vein and ureter are mobilized medially and preserved.  The soleus muscles retracted out laterally and the L4-L5 disc space is identified. A needle was placed in the disc and under fluoroscopy Dr. Mena confirms that this is indeed L4-L5.  Dr. Singer would dictate his portion which removed the disc at L4-L5 in places a cage fusion in this area after this been accomplished hemostasis appears adequate.  All structures are patent and intact without injury. Wounds closed in layers by closing the posterior rectus transverse abdominus with interrupted 1.  Vicryl suture the internal oblique external block and anterior rectus were individually closed with interrupted 1.  Vicryl suture skin was closed with skin clips sterile dressings are applied sponge lap and instrument counts were correct x2.  Patient tolerated procedure well and should not undergo posterior surgery as well thank you

## 2020-03-31 NOTE — BRIEF OP NOTE
[facility]  Brief Operative Note    SUMMARY     Surgery Date: 3/31/2020     Surgeon(s) and Role:     * Cholo Mena MD - Primary     * Pete Fagan MD - Co-Surgeon for cpt 17503-19    Assisting Surgeon:  Pete Guzmán physician's assistant    Pre-op Diagnosis:  Disc degeneration, lumbar [M51.36]  Lumbar stenosis without neurogenic claudication [M48.061]  Spondylolisthesis at L4-L5 level [M43.16]  Lumbar spine instability [M53.2X6]  History of lumbar fusion [Z98.1]  Bilateral leg weakness    Post-op Diagnosis:  Post-Op Diagnosis Codes:     * Disc degeneration, lumbar [M51.36]     * Lumbar stenosis without neurogenic claudication [M48.061]     * Spondylolisthesis at L4-L5 level [M43.16]     * Lumbar spine instability [M53.2X6]     * History of lumbar fusion [Z98.1]  Bilateral leg weakness    Procedure(s) (LRB):  LAMINECTOMY, SPINE, LUMBAR, FOR DECOMPRESSION L4 (Bilateral)  FUSION, SPINE, WITH INSTRUMENTATION L4-5 (N/A)  FUSION, SPINE, LUMBAR, ALIF L4-5 (N/A)  BONE GRAFT (N/A)    Anesthesia: General    Description of Procedure:  Anterior lumbar interbody fusion L4-5 with interbody device, laminectomy L4, posterolateral fusion L4-5, removal of instrumentation L5-S1, nonsegmental instrumentation L4-5,    Description of the findings of the procedure:  Lumbar stenosis with spondylolisthesis L4-5 level    Estimated Blood Loss: 400 mL         Specimens:  L4-5 disc   Specimen (12h ago, onward)    None

## 2020-03-31 NOTE — ANESTHESIA PREPROCEDURE EVALUATION
03/31/2020  Josh Coulter is a 49 y.o., male.    Anesthesia Evaluation    I have reviewed the Patient Summary Reports.    I have reviewed the Nursing Notes.   I have reviewed the Medications.     Review of Systems  Anesthesia Hx:  No problems with previous Anesthesia    Social:  Smoker, Alcohol Use +Marijuana    Hematology/Oncology:     Oncology Normal   Hematology Comments: Hyponatremia    EENT/Dental:EENT/Dental Normal   Cardiovascular:  Cardiovascular Normal     Pulmonary:  Pulmonary Normal    Renal/:  Renal/ Normal     Musculoskeletal:  Spine Disorders: lumbar    Neurological:   Syncope    Endocrine:  Endocrine Normal    Dermatological:  Skin Normal    Psych:  Psychiatric Normal           Physical Exam  General:  Well nourished    Airway/Jaw/Neck:  Airway Findings: Mouth Opening: Normal Tongue: Normal  General Airway Assessment: Adult  Mallampati: II  Improves to I with phonation.  TM Distance: Normal, at least 6 cm        Eyes/Ears/Nose:  EYES/EARS/NOSE FINDINGS: Normal   Dental:  Dental Findings: In tact   Chest/Lungs:  Chest/Lungs Findings: Clear to auscultation, Normal Respiratory Rate     Heart/Vascular:  Heart Findings: Rate: Normal  Rhythm: Regular Rhythm        Mental Status:  Mental Status Findings:  Cooperative, Alert and Oriented         Anesthesia Plan  Type of Anesthesia, risks & benefits discussed:  Anesthesia Type:  general  Patient's Preference:   Intra-op Monitoring Plan: standard ASA monitors  Intra-op Monitoring Plan Comments:   Post Op Pain Control Plan: multimodal analgesia and IV/PO Opioids PRN  Post Op Pain Control Plan Comments:   Induction:   IV  Beta Blocker:  Patient is not currently on a Beta-Blocker (No further documentation required).       Informed Consent: Patient understands risks and agrees with Anesthesia plan.  Questions answered. Anesthesia consent signed with  patient.  ASA Score: 2     Day of Surgery Review of History & Physical: I have interviewed and examined the patient. I have reviewed the patient's H&P dated:    H&P update referred to the surgeon.         Ready For Surgery From Anesthesia Perspective.

## 2020-03-31 NOTE — PLAN OF CARE
Report to Bing. Patient denies pain/ no nausea present, easily arousable, sleeping  comfortably, vs stable. B/P  90's/ no adverse affect, cleared with Dr. Stapleton to transfer patient, Dressing to abdomen dry, intact, no drainage hemovac to back, dressing in back dry intact, no drainage. PCA not in use, patient sleepy, not using button, no complaint of pain, O2 100%. Wife aware of patients room and report of post surgical care.

## 2020-03-31 NOTE — H&P
Dictation #1  MRN:[id number]  [facility]:[id number]     CC/Indication for Procedure: 49 y.o. male with Disc degeneration, lumbar [M51.36]  Lumbar stenosis without neurogenic claudication [M48.061]  Spondylolisthesis at L4-L5 level [M43.16]  Lumbar spine instability [M53.2X6]  History of lumbar fusion [Z98.1]  Lumbar stenosis with neurogenic claudication [M48.062].    Patient scheduled for LAMINECTOMY, SPINE, LUMBAR, FOR DECOMPRESSION L4  FUSION, SPINE, WITH INSTRUMENTATION L4-5  FUSION, SPINE, LUMBAR, ALIF L4-5  BONE GRAFT  GA LAMINECTOMY,FACETECTOMY,LUMBAR [82279]  GA ARTHRODESIS POSTERIOR/POSTEROLATERAL LUMBAR [78319]  GA POSTERIOR NON-SEGMENTAL INSTRUMENTATION [05421]  GA INSERT BIOMECH DEV W/INTERBODY ARTHRODESIS, EA CONTIGUOUS DEFECT [26343]  GA ARTHRODESIS ANT INTERBODY MIN DISCECTOMY,LUMBAR [96782]  GA AUTOGRAFT SPINE SURGERY MORSELIZED SEP INCISION [43603].    Past Medical History:   Diagnosis Date    Hyponatremia     Syncope      Past Surgical History:   Procedure Laterality Date    HERNIA REPAIR      SPINE SURGERY  2009     Family History   Problem Relation Age of Onset    Hyperlipidemia Mother     Hypertension Mother     Hyperlipidemia Father     Hypertension Father      Social History     Socioeconomic History    Marital status: Legally      Spouse name: Not on file    Number of children: 3    Years of education: Not on file    Highest education level: Not on file   Occupational History    Occupation: unemployed   Social Needs    Financial resource strain: Not on file    Food insecurity:     Worry: Not on file     Inability: Not on file    Transportation needs:     Medical: Not on file     Non-medical: Not on file   Tobacco Use    Smoking status: Current Every Day Smoker     Packs/day: 1.00     Years: 19.00     Pack years: 19.00     Types: Cigarettes    Smokeless tobacco: Never Used   Substance and Sexual Activity    Alcohol use: Yes     Alcohol/week: 14.0 standard drinks     Types: 14  Cans of beer per week     Comment: 2 beers per night    Drug use: Yes     Types: Marijuana    Sexual activity: Yes     Partners: Female   Lifestyle    Physical activity:     Days per week: Not on file     Minutes per session: Not on file    Stress: Very much   Relationships    Social connections:     Talks on phone: Not on file     Gets together: Not on file     Attends Buddhism service: Not on file     Active member of club or organization: Not on file     Attends meetings of clubs or organizations: Not on file     Relationship status: Not on file   Other Topics Concern    Not on file   Social History Narrative    Not on file       Review of patient's allergies indicates:   Allergen Reactions    Contrast media Other (See Comments)     syncopy         Current Facility-Administered Medications:     cefazolin (ANCEF) 2 gram in dextrose 5% 50 mL IVPB (premix), 2 g, Intravenous, On Call Procedure, Cholo Mena MD    electrolyte-S (ISOLYTE), , Intravenous, Continuous, Senthil Stapleton MD, Last Rate: 10 mL/hr at 03/31/20 0835, 10 mL at 03/31/20 0835    lactated ringers infusion, 10 mL/hr, Intravenous, Continuous, Senthil Stapleton MD    sodium chloride 0.9% flush 3 mL, 3 mL, Intravenous, Q8H, Senthil Stapleton MD    ROS:    Denies chest pain or palpitations  Denies shortness of breath  Denies fevers or chills  Denies chest pain  Denies abdominal pain    PE:    General Appearance: Well nourished  Orientation: Oriented to time, place, person  Mental Status: Alert  Heart: RRR  Lungs: CTA  Abdomen: Soft and non-tender    Anesthesia/Surgery risks, benefits and alternative options discussed and understood by patient/family.    This note was created using Dragon voice recognition software that occasionally misinterpreted phrases or words.

## 2020-03-31 NOTE — ANESTHESIA POSTPROCEDURE EVALUATION
Anesthesia Post Evaluation    Patient: Josh Coulter    Procedure(s) Performed: Procedure(s) (LRB):  LAMINECTOMY, SPINE, LUMBAR, FOR DECOMPRESSION L4 (Bilateral)  FUSION, SPINE, WITH INSTRUMENTATION L4-5 (N/A)  FUSION, SPINE, LUMBAR, ALIF L4-5 (N/A)  BONE GRAFT (N/A)    Final Anesthesia Type: general    Patient location during evaluation: PACU  Patient participation: Yes- Able to Participate  Level of consciousness: awake and alert and oriented  Post-procedure vital signs: reviewed and stable  Pain management: adequate  Airway patency: patent    PONV status at discharge: No PONV  Anesthetic complications: no      Cardiovascular status: blood pressure returned to baseline  Respiratory status: unassisted, spontaneous ventilation and room air  Hydration status: euvolemic  Follow-up not needed.          Vitals Value Taken Time   BP 98/67 3/31/2020  2:50 PM   Temp 36.3 °C (97.3 °F) 3/31/2020  2:45 PM   Pulse 107 3/31/2020  2:50 PM   Resp 16 3/31/2020  2:50 PM   SpO2 99 % 3/31/2020  2:50 PM         No case tracking events are documented in the log.      Pain/Kait Score: No data recorded

## 2020-03-31 NOTE — PLAN OF CARE
Pt in pre op pt came in wheelchair from X ray, uses cane to walk, noticeable in pain, rates pain in back 8/10 at present  Sodium level =117anesthesia notified  Pt states he always runs a low sodium level  Will contimue to monitor

## 2020-03-31 NOTE — OP NOTE
Dictation #2  MRN:[id number]  CSN:[id number]   [facility]  Orthopedic  Operative Note    SUMMARY     Date of Procedure: 3/31/2020     Procedure:   1.  Anterior lumbar interbody fusion L4-5 level CPT code 60943-39  2.  Insertion of intervertebral prosthetic interbody device L4-5 level CPT code 52124  3.  L4 laminectomy for spondylolisthesis CPT code 23652  Posterolateral fusion L4-5 level CPT code 93850  4.  Posterior nonsegmental instrumentation L4-5 level using Medtronic titanium pedicle screw and mike system CPT code 81578  5.  Removal of posterior segmental instrumentation L5-S1 level CPT code 25694  6.  Harvesting local autograft through same incision for spinal fusion CPT code 11345  7.  Computer assisted spinal navigation for insertion of pedicle screws CPT code 43783      Surgeon(s) and Role:     * Cholo Mena MD - Primary     * Pete Fagan MD - Co-Surgeon for anterior lumbar interbody fusion CPT code 2255 8-62 only    Assisting Surgeon:  Pete Guzmán physician's assistant for anterior and posterior procedures    Pre-Operative Diagnosis: Disc degeneration, lumbar [M51.36]  Lumbar stenosis without neurogenic claudication [M48.061]  Spondylolisthesis at L4-L5 level [M43.16]  Lumbar spine instability [M53.2X6]  History of lumbar fusion [Z98.1]  Progressive weakness both legs    Post-Operative Diagnosis: Post-Op Diagnosis Codes:     * Disc degeneration, lumbar [M51.36]     * Lumbar stenosis without neurogenic claudication [M48.061]     * Spondylolisthesis at L4-L5 level [M43.16]     * Lumbar spine instability [M53.2X6]     * History of lumbar fusion [Z98.1]  Progressive weakness both legs    Anesthesia: General    Procedure in General:  Indications for demonst less we felt an anterior approach without mild main maintenance of the reduction as opposed to a lateral approach.  The anterior abdomen we saved and then cleansed with alcohol and prepped with ChloraPrep solution.  The  abdomen was draped in the usual fashion and the patient was given intravenous antibiotics 5 will by a time out procedure.  rating that the spondylolisthesis reduced from a grade 2 spondylolisthesis to almost near anatomic alignment.  Taken with fluoroscopy On the table in supine position with a roll behind his back a lateral x-ray was This gentleman 49 years of age had noted progressively increasing complaints of pain in his back and weakness in both legs with the right leg being greater than left.  Diagnostic studies showed a a unstable spondylolisthesis at the L4-5 level.  Because of continued symptoms and progressively increasing symptoms of weakness in both legs surgery was indicated.  The procedure risks benefits complications and alternatives were discussed.  The patient was brought to the operating room and placed on the table in the supine position.  His abdomen was shaved prepped and draped in the usual fashion.  A time-out was performed.  He was given intravenous antibiotics.  Dr. Fagan will dictate his portion of the procedure then made a left lower quadrant incision and a retroperitoneal approach down to the spine where we identified the L4-5 level with fluoroscopy carefully retract vital structures we incised the disc annulus at L4-5 and removed the disc in a piecemeal fashion using disc Boyd as well as curette and pituitary rongeur.  Trial interbody sizers were inserted and a 12 mm trial gave good distraction of the disc space and maintenance of disc space height with maintenance of the near anatomic reduction of the spondylolisthesis.  Infuse bone morphogenic protein was repaired in a routine fashion and a 12 mm Medtronic peek interbody cage was brought onto the field and filled with infuse bone morphogenic protein.  Under direct visualization interbody device was impacted into the L4-5 disc space level and position checked with fluoroscopy.  Position was satisfactory.  Not adenopathy then  closed the wound in layers without a drain and staples were used on the skin.  This completed Dr. huerta is but this patient and the surgical procedure  The patient was then transferred to a stretcher transverse growth placed on the table and he was returned to the operating table now in the supine position with his abdomen allowed to hang free to maintain lumbar lordosis is back with cleansed with alcohol then prepped with ChloraPrep solution and draped in the usual fashion.  An incision was made from L4 to the sacrum and we identified the previous hardware at L5-S1.  We identified the L4 lamina and transverse process of L4 bilaterally as well as the facet joints at L4-5 the hardware was removed by using the universal screw removal system removing the pedicle screw and rods at L5-S1 bilaterally.  The pedicle tract that L5 was intact.  We started in the midline removing the spinous process of L4 and then performed a complete laminectomy of the L4 level with partial facetectomy and foraminotomies in order to decompress both L4 and both L3 nerve roots.  All bone graft harvested during the decompression saved to later be used for the spinal fusion.  The [employer] reference arc was then placed on the L3 spinous process and the O arm navigation device was brought onto the operative field we visualized the L4 and L5 pedicles and then performed an intraoperative CT scan.  The CT scan was interpreted and then using normal anatomic landmarks and computer-assisted navigation we inserted pedicle screws into L4 and L5 bilaterally 6.5 mm screws Medtronic titanium screws were used at L4 bilaterally and 7.5 mm screws were used at L5 bilaterally.  7.5 mm screws were used at L5 because the previous screws removed were 6.5 mm in diameter intraoperative EMG pedicle screw testing was then performed of all 4 screws in all 4 levels were tested at greater than 10 milliamps.  We then decorticated the transverse process of L4 and  superior aspect of the fusion mass at L5 and placed the bone graft previously harvested and and in the lateral gutters.  A 40 mm titanium mike was then used to connect the pedicle screws from L4-L5 bilaterally and set screws were inserted and tightened to proper torque the entire construct was then visualized using fluoroscopy and looked excellent.  The lumbodorsal fascia was closed with 1.  Vicryl suture after placing small pieces of Gelfoam over the exposed dura and nerve roots.  A drain was brought through a separate stab incision the subcutaneous tissues were closed and then staples were used on the skin sterile dressings were applied and the patient was turned supine and extubated and brought to recovery room estimated blood loss for the anterior procedure was 50 cc an estimated blood loss for the posterior procedure was 350 cc for a total of 400 cc sponge and needle counts were correct            Complications: None    Estimated Blood Loss (EBL):            Implants:   Implant Name Type Inv. Item Serial No.  Lot No. LRB No. Used   KIT MED BONE INFUSE - [id number]  KIT MED BONE INFUSE  [employer] [facility] [id number]  1   ALLOGRAFT REVITA [id number] - [id number]  ALLOGRAFT REVITA [id number]  [employer]   1   INTERBODY SPACER 8 DEGREE, 12MM X 30MM X 24MM    [employer] (IMPLANTS) [id number]  1       Specimens:   Specimen (12h ago, onward)    None                  Condition: Good    Disposition: PACU - hemodynamically stable.    Attestation: I was present and scrubbed for the entire procedure.

## 2020-03-31 NOTE — PLAN OF CARE
Dr pickard also notified of pts sodium level =117  Also notified dr pickard that UA was not collected yet, will run fluids in order to help pt urinate, but dr benjamin states if not its ok

## 2020-03-31 NOTE — CONSULTS
General surgery consult    Asked to see this 49-year-old male who had been evaluated by Dr. Mena orthopedic surgery because of lumbar stenosis and spondylolisthesis at L4-L5.  The patient had been evaluated and worked up and it was felt he would benefit from an anterior diskectomy and fusion at L4-L5 and I was asked to see the patient to explain the general surgical aspects of this procedure to expose the spine anteriorly    Operations previous umbilical hernia, posterior spinal surgery  Allergies to intravenous dye  Medicines include Flexeril and codeine    Social history patient is a smoker and admits to approximately 2 beers at night    Review of systems is positive for lumbar disc disease, patient admits to occasional what he would describe as erectile dysfunction    This is an alert 49-year-old male in no acute distress  Sclera nonicteric conjunctiva pink  Lungs are clear  patient has a large tattoo across his chest  Abdomen soft nontender    The general surgical portion of the procedure and risks are explained including but not limited to scar incisional numbness hernia bleeding infection pneumonia and blood clots damage to the internal organs or blood vessel weak hip former left leg impotence retrograde ejaculation and others he understands and consents

## 2020-04-01 LAB
ANION GAP SERPL CALC-SCNC: 10 MMOL/L (ref 8–16)
BASO STIPL BLD QL SMEAR: ABNORMAL
BASOPHILS # BLD AUTO: ABNORMAL K/UL (ref 0–0.2)
BASOPHILS NFR BLD: 0 % (ref 0–1.9)
BUN SERPL-MCNC: 3 MG/DL (ref 6–20)
CALCIUM SERPL-MCNC: 8 MG/DL (ref 8.7–10.5)
CHLORIDE SERPL-SCNC: 91 MMOL/L (ref 95–110)
CO2 SERPL-SCNC: 25 MMOL/L (ref 23–29)
CREAT SERPL-MCNC: 0.8 MG/DL (ref 0.5–1.4)
DIFFERENTIAL METHOD: ABNORMAL
EOSINOPHIL # BLD AUTO: ABNORMAL K/UL (ref 0–0.5)
EOSINOPHIL NFR BLD: 0 % (ref 0–8)
ERYTHROCYTE [DISTWIDTH] IN BLOOD BY AUTOMATED COUNT: 12.3 % (ref 11.5–14.5)
EST. GFR  (AFRICAN AMERICAN): >60 ML/MIN/1.73 M^2
EST. GFR  (NON AFRICAN AMERICAN): >60 ML/MIN/1.73 M^2
GLUCOSE SERPL-MCNC: 133 MG/DL (ref 70–110)
HCT VFR BLD AUTO: 30.4 % (ref 40–54)
HGB BLD-MCNC: 10.2 G/DL (ref 14–18)
IMM GRANULOCYTES # BLD AUTO: ABNORMAL K/UL (ref 0–0.04)
IMM GRANULOCYTES NFR BLD AUTO: ABNORMAL % (ref 0–0.5)
LYMPHOCYTES # BLD AUTO: ABNORMAL K/UL (ref 1–4.8)
LYMPHOCYTES NFR BLD: 9 % (ref 18–48)
MCH RBC QN AUTO: 32.5 PG (ref 27–31)
MCHC RBC AUTO-ENTMCNC: 33.6 G/DL (ref 32–36)
MCV RBC AUTO: 97 FL (ref 82–98)
MONOCYTES # BLD AUTO: ABNORMAL K/UL (ref 0.3–1)
MONOCYTES NFR BLD: 9 % (ref 4–15)
NEUTROPHILS NFR BLD: 76 % (ref 38–73)
NEUTS BAND NFR BLD MANUAL: 6 %
NRBC BLD-RTO: 0 /100 WBC
PLATELET # BLD AUTO: 92 K/UL (ref 150–350)
PLATELET BLD QL SMEAR: ABNORMAL
PMV BLD AUTO: 10.4 FL (ref 9.2–12.9)
POTASSIUM SERPL-SCNC: 4.7 MMOL/L (ref 3.5–5.1)
RBC # BLD AUTO: 3.14 M/UL (ref 4.6–6.2)
SODIUM SERPL-SCNC: 126 MMOL/L (ref 136–145)
WBC # BLD AUTO: 7.84 K/UL (ref 3.9–12.7)

## 2020-04-01 PROCEDURE — 12000002 HC ACUTE/MED SURGE SEMI-PRIVATE ROOM

## 2020-04-01 PROCEDURE — 97116 GAIT TRAINING THERAPY: CPT

## 2020-04-01 PROCEDURE — 94770 HC EXHALED C02 TEST: CPT

## 2020-04-01 PROCEDURE — 94799 UNLISTED PULMONARY SVC/PX: CPT

## 2020-04-01 PROCEDURE — 97161 PT EVAL LOW COMPLEX 20 MIN: CPT

## 2020-04-01 PROCEDURE — 25000003 PHARM REV CODE 250: Performed by: PHYSICIAN ASSISTANT

## 2020-04-01 PROCEDURE — 63600175 PHARM REV CODE 636 W HCPCS: Performed by: PHYSICIAN ASSISTANT

## 2020-04-01 PROCEDURE — 97530 THERAPEUTIC ACTIVITIES: CPT

## 2020-04-01 PROCEDURE — 99900035 HC TECH TIME PER 15 MIN (STAT)

## 2020-04-01 PROCEDURE — 80048 BASIC METABOLIC PNL TOTAL CA: CPT

## 2020-04-01 PROCEDURE — 85027 COMPLETE CBC AUTOMATED: CPT

## 2020-04-01 PROCEDURE — 97165 OT EVAL LOW COMPLEX 30 MIN: CPT

## 2020-04-01 PROCEDURE — 97535 SELF CARE MNGMENT TRAINING: CPT

## 2020-04-01 PROCEDURE — 27000221 HC OXYGEN, UP TO 24 HOURS

## 2020-04-01 PROCEDURE — 25000003 PHARM REV CODE 250: Performed by: ORTHOPAEDIC SURGERY

## 2020-04-01 PROCEDURE — 85007 BL SMEAR W/DIFF WBC COUNT: CPT

## 2020-04-01 PROCEDURE — 36415 COLL VENOUS BLD VENIPUNCTURE: CPT

## 2020-04-01 PROCEDURE — 25000003 PHARM REV CODE 250: Performed by: ANESTHESIOLOGY

## 2020-04-01 RX ORDER — HYDROCODONE BITARTRATE AND ACETAMINOPHEN 10; 325 MG/1; MG/1
1 TABLET ORAL EVERY 6 HOURS PRN
Status: DISCONTINUED | OUTPATIENT
Start: 2020-04-01 | End: 2020-04-02 | Stop reason: HOSPADM

## 2020-04-01 RX ADMIN — CEFAZOLIN SODIUM 2 G: 2 SOLUTION INTRAVENOUS at 10:04

## 2020-04-01 RX ADMIN — ALUMINUM HYDROXIDE, MAGNESIUM HYDROXIDE, AND SIMETHICONE 30 ML: 200; 200; 20 SUSPENSION ORAL at 12:04

## 2020-04-01 RX ADMIN — SENNOSIDES AND DOCUSATE SODIUM 2 TABLET: 8.6; 5 TABLET ORAL at 08:04

## 2020-04-01 RX ADMIN — HYDROCODONE BITARTRATE AND ACETAMINOPHEN 1 TABLET: 10; 325 TABLET ORAL at 04:04

## 2020-04-01 RX ADMIN — METOCLOPRAMIDE 10 MG: 5 INJECTION, SOLUTION INTRAMUSCULAR; INTRAVENOUS at 08:04

## 2020-04-01 RX ADMIN — DOCUSATE SODIUM 100 MG: 100 CAPSULE, LIQUID FILLED ORAL at 10:04

## 2020-04-01 RX ADMIN — METOCLOPRAMIDE 10 MG: 5 INJECTION, SOLUTION INTRAMUSCULAR; INTRAVENOUS at 03:04

## 2020-04-01 RX ADMIN — MUPIROCIN 1 G: 20 OINTMENT TOPICAL at 08:04

## 2020-04-01 RX ADMIN — HYDROMORPHONE HYDROCHLORIDE 2 MG: 2 INJECTION, SOLUTION INTRAMUSCULAR; INTRAVENOUS; SUBCUTANEOUS at 05:04

## 2020-04-01 RX ADMIN — OXYCODONE HYDROCHLORIDE 15 MG: 5 TABLET ORAL at 08:04

## 2020-04-01 RX ADMIN — ACETAMINOPHEN 1000 MG: 10 INJECTION, SOLUTION INTRAVENOUS at 03:04

## 2020-04-01 RX ADMIN — ALUMINUM HYDROXIDE, MAGNESIUM HYDROXIDE, AND SIMETHICONE 30 ML: 200; 200; 20 SUSPENSION ORAL at 10:04

## 2020-04-01 RX ADMIN — CEFAZOLIN SODIUM 2 G: 2 SOLUTION INTRAVENOUS at 02:04

## 2020-04-01 RX ADMIN — MUPIROCIN 1 G: 20 OINTMENT TOPICAL at 10:04

## 2020-04-01 RX ADMIN — ACETAMINOPHEN 1000 MG: 10 INJECTION, SOLUTION INTRAVENOUS at 05:04

## 2020-04-01 RX ADMIN — OXYCODONE HYDROCHLORIDE 15 MG: 5 TABLET ORAL at 12:04

## 2020-04-01 RX ADMIN — CYCLOBENZAPRINE HYDROCHLORIDE 10 MG: 5 TABLET, FILM COATED ORAL at 12:04

## 2020-04-01 RX ADMIN — METOCLOPRAMIDE 10 MG: 5 INJECTION, SOLUTION INTRAMUSCULAR; INTRAVENOUS at 12:04

## 2020-04-01 RX ADMIN — CLONAZEPAM 0.5 MG: 0.5 TABLET ORAL at 05:04

## 2020-04-01 RX ADMIN — ONDANSETRON 8 MG: 4 TABLET, ORALLY DISINTEGRATING ORAL at 12:04

## 2020-04-01 RX ADMIN — CLONAZEPAM 0.5 MG: 0.5 TABLET ORAL at 10:04

## 2020-04-01 NOTE — PLAN OF CARE
Problem: Occupational Therapy Goal  Goal: Occupational Therapy Goal  Description  Goals to be met by: 4/15/2020     Patient will increase functional independence with ADLs by performing:    LE Dressing with Supervision and Assistive Devices as needed.  Grooming while standing at sink with Supervision.  Toileting from toilet with Supervision for hygiene and clothing management.   Supine to sit with Supervision.  Toilet transfer to toilet with Supervision.     Outcome: Ongoing, Progressing

## 2020-04-01 NOTE — PLAN OF CARE
Patient alert and oriented resting in bed. NAD. Denies pain or SOB. VSS. Brothers to gravity. PCA pump in reach and monitored closely do to low blood pressure.  Bed alarm active. Plan of care reviewed with patient. Verbalizes understanding.Call light in reach. Pt free from fall or injury. Will monitor.

## 2020-04-01 NOTE — PT/OT/SLP EVAL
Physical Therapy Evaluation    Patient Name:  Josh Coulter   MRN:  [id number]    Recommendations:     Discharge Recommendations:  home   Discharge Equipment Recommendations: none   Barriers to discharge: None    Assessment:     Josh Coulter is a 49 y.o. male admitted with a medical diagnosis of Lumbar stenosis with neurogenic claudication.  He presents with the following impairments/functional limitations:  weakness, impaired endurance, pain, gait instability, impaired functional mobilty, impaired balance, decreased lower extremity function .  Patient eagerly agreeable to PT evaluation but did report feeling very dizzy when sitting and standing.  Patient also reported low back pain between a 3-4/10 with all of treatment.  Patient presented supine in bed and was able to transfer supine to sit with SBA and sit to stand with CGA but then became too dizzy to stand long or attempt to walk.  Patient stood 3 times but had same symptoms each time so did not ambulate this session.  Patient BP monitored as follows:  After first stand 75/41, after sitting for several minutes 91/64 and after second attempt to stand 86/47 so patient returned to bed and nurse informed.    Rehab Prognosis: Good; patient would benefit from acute skilled PT services to address these deficits and reach maximum level of function.    Recent Surgery: Procedure(s) (LRB):  LAMINECTOMY, SPINE, LUMBAR, FOR DECOMPRESSION L4 (Bilateral)  FUSION, SPINE, WITH INSTRUMENTATION L4-5 (N/A)  FUSION, SPINE, LUMBAR, ALIF L4-5 (N/A)  BONE GRAFT (N/A) 1 Day Post-Op    Plan:     During this hospitalization, patient to be seen BID to address the identified rehab impairments via gait training, therapeutic activities, therapeutic exercises and progress toward the following goals:    · Plan of Care Expires:  05/06/20    Subjective     Chief Complaint: feeling dizzy  Patient/Family Comments/goals: go home ASAP  Pain/Comfort:  · Pain Rating 1: 4/10  · Location - Side 1:  Bilateral  · Location - Orientation 1: posterior  · Location 1: lumbar spine  · Pain Addressed 1: Cessation of Activity, Reposition  · Pain Rating Post-Intervention 1: 3/10    Patients cultural, spiritual, Taoist conflicts given the current situation:      Living Environment:  Currently lives with son in 2 story home but patient bedroom on ground floor so patient does not have to go upstairs if does not want to.  Prior to admission, patients level of function was modified independent.  Equipment used at home: cane, straight.  DME owned (not currently used): none.  Upon discharge, patient will have assistance from family.    Objective:     Communicated with nurse Smart prior to session.  Patient found supine with oxygen, peripheral IV, bed alarm, SCD  upon PT entry to room.    General Precautions: Standard, fall   Orthopedic Precautions:    Braces:       Exams:  · RLE ROM: WFL  · RLE Strength: WNL  · LLE ROM: WFL  · LLE Strength: WNL    Functional Mobility:  · Bed Mobility:     · Supine to Sit: supervision  · Sit to Supine: supervision  · Transfers:     · Sit to Stand:  contact guard assistance with rolling walker      Therapeutic Activities and Exercises:   transfer training supine to/from stand with supervision, sit to stand x 3 times with CGA and with RW.  Patient reported extreme dizziness each time standing so gait not attempt.    AM-PAC 6 CLICK MOBILITY  Total Score:14     Patient left supine with call button in reach, bed alarm on and nurse notified.    GOALS:   Multidisciplinary Problems     Physical Therapy Goals        Problem: Physical Therapy Goal    Goal Priority Disciplines Outcome Goal Variances Interventions   Physical Therapy Goal     PT, PT/OT Ongoing, Progressing     Description:  Goals to be met by: 2020    Patient will increase functional independence with mobility by performin. Supine to sit with [place]  2. Sit to supine with [place]  3. Sit to stand transfer with  [place]  4. Bed to chair transfer with Modified [place] using Single-point Cane   5. Gait  x 150 feet with Supervision using Single-point Cane .                       History:     Past Medical History:   Diagnosis Date    Hyponatremia     Syncope        Past Surgical History:   Procedure Laterality Date    HERNIA REPAIR      SPINE SURGERY  2009       Time Tracking:     PT Received On: 04/01/20  PT Start Time: 0838     PT Stop Time: 0910  PT Total Time (min): 32 min     Billable Minutes: Evaluation 20 and Therapeutic Activity 12      Chris Megilligan, PT  04/01/2020

## 2020-04-01 NOTE — PLAN OF CARE
Problem: Physical Therapy Goal  Goal: Physical Therapy Goal  Description  Goals to be met by: 2020    Patient will increase functional independence with mobility by performin. Supine to sit with [place]  2. Sit to supine with [place]  3. Sit to stand transfer with [place]  4. Bed to chair transfer with Modified [place] using Single-point Cane   5. Gait  x 150 feet with Supervision using Single-point Cane .      Outcome: Ongoing, Progressing

## 2020-04-01 NOTE — HPI
Postoperative day 2.  S/P L4-5 APLIF with L4 [place]  - Nursing states that he mistakenly pulled his Hemovac drain out this am. It should have been removed yesterday with dressing change.

## 2020-04-01 NOTE — PLAN OF CARE
CM completed discharge assessment over the phone with pt. Pt verified information on facesheet as correct. Pt denies POA/LW. NOK is spouse Beryl. PCP is CASSANDRA Lopez. Pharm is [facility] pharmacy. Pt reports living at listed address with spouse. Denies hh/hd. DME- cane, wc. CM notified him of Dr. Mena ordering RW and BSC - pt states he would like equipment. Pt reports being independent with ADLs. Pt reports that his spouse or son will provide transportation home upon dc. DC plan is home. CM following.     Pt has case management consult for home health- pt has medicaid and cannot have PT/OT in the home. CM notified Dr. Mena and Pete TRAMMELL via secure chat.      04/01/20 1030   Discharge Assessment   Assessment Type Discharge Planning Assessment   Confirmed/corrected address and phone number on facesheet? Yes   Assessment information obtained from? Patient   Communicated expected length of stay with patient/caregiver yes   Prior to hospitilization cognitive status: Alert/Oriented   Prior to hospitalization functional status: Independent   Current cognitive status: Alert/Oriented   Current Functional Status: Independent;Assistive Equipment   Lives With spouse;child(nadia), adult   Able to Return to Prior Arrangements yes   Is patient able to care for self after discharge? Yes   Patient's perception of discharge disposition home or selfcare   Readmission Within the Last 30 Days no previous admission in last 30 days   Patient currently being followed by outpatient case management? No   Patient currently receives any other outside agency services? No   Equipment Currently Used at Home cane, straight;wheelchair   Do you have any problems affording any of your prescribed medications? No   Is the patient taking medications as prescribed? yes   Does the patient have transportation home? Yes   Transportation Anticipated family or friend will provide   Does the patient receive services at the [facility]? No    Discharge Plan A Home with family   Discharge Plan B Home   DME Needed Upon Discharge  none   Patient/Family in Agreement with Plan yes

## 2020-04-01 NOTE — PROGRESS NOTES
[facility]  Orthopedics  Progress Note    Patient Name: Josh Coulter  MRN: [id number]  Admission Date: 3/31/2020  Hospital Length of Stay: 1 days  Attending Provider: Cholo Mena MD  Primary Care Provider: JORGE Ellsworth  Follow-up For: Procedure(s) (LRB):  LAMINECTOMY, SPINE, LUMBAR, FOR DECOMPRESSION L4 (Bilateral)  FUSION, SPINE, WITH INSTRUMENTATION L4-5 (N/A)  FUSION, SPINE, LUMBAR, ALIF L4-5 (N/A)  BONE GRAFT (N/A)    Post-Operative Day: 1 Day Post-Op  Subjective:     Principal Problem:Lumbar stenosis with neurogenic claudication    Principal Orthopedic Problem:      Interval History:  Postoperative status    Review of patient's allergies indicates:   Allergen Reactions    Contrast media Other (See Comments)     syncopy       Current Facility-Administered Medications   Medication    acetaminophen (10 mg/mL) injection 1,000 mg    acetaminophen tablet 650 mg    aluminum-magnesium hydroxide-simethicone 200-200-20 mg/5 mL suspension 30 mL    bisacodyL suppository 10 mg    cefazolin (ANCEF) 2 gram in dextrose 5% 50 mL IVPB (premix)    clonazePAM tablet 0.5 mg    cyclobenzaprine tablet 10 mg    diphenhydrAMINE capsule 50 mg    diphenhydrAMINE injection 12.5 mg    docusate sodium capsule 100 mg    electrolyte-S (ISOLYTE)    electrolyte-S (ISOLYTE)    hydromorphone (PF) injection 1 mg    hydromorphone (PF) injection 2 mg    HYDROmorphone PCA syringe 6 mg/30 mL (0.2 mg/mL) NS    lactated ringers infusion    lactated ringers infusion    metoclopramide HCl injection 10 mg    mupirocin 2 % ointment 1 g    naloxone 0.4 mg/mL injection 0.04 mg    ondansetron disintegrating tablet 8 mg    ondansetron injection 8 mg    oxyCODONE immediate release tablet 10 mg    oxyCODONE immediate release tablet 15 mg    oxyCODONE immediate release tablet 5 mg    promethazine (PHENERGAN) 6.25 mg in dextrose 5 % 50 mL IVPB    promethazine (PHENERGAN) 6.25 mg in dextrose 5 % 50 mL IVPB  "   senna-docusate 8.6-50 mg per tablet 2 tablet     Objective:     Vital Signs (Most Recent):  Temp: 97.2 °F (36.2 °C) (04/01/20 0726)  Pulse: 101 (04/01/20 0730)  Resp: 14 (04/01/20 0730)  BP: 110/70 (04/01/20 0726)  SpO2: 99 % (04/01/20 0730) Vital Signs (24h Range):  Temp:  [97.2 °F (36.2 °C)-98.5 °F (36.9 °C)] 97.2 °F (36.2 °C)  Pulse:  [] 101  Resp:  [14-18] 14  SpO2:  [98 %-100 %] 99 %  BP: ()/(52-85) 110/70     Weight: 74.8 kg (165 lb)  Height: 6' 4" (193 cm)  Body mass index is 20.08 kg/m².      Intake/Output Summary (Last 24 hours) at 4/1/2020 0820  Last data filed at 3/31/2020 1600  Gross per 24 hour   Intake 2700 ml   Output 2150 ml   Net 550 ml       General    Vitals reviewed.  Constitutional: He is oriented to person, place, and time.   Pulmonary/Chest: Effort normal.   Abdominal: Soft.   Neurological: He is alert and oriented to person, place, and time.   Psychiatric: He has a normal mood and affect.         Back (L-Spine & T-Spine) / Neck (C-Spine) Exam     Comments:  Motor function all major muscle groups intact.  Drain in place.        Significant Labs: All pertinent labs within the past 24 hours have been reviewed.    Significant Imaging: None    Assessment/Plan:     Spondylolisthesis of lumbar region  Begin ambulation progress diet as tolerated.  Discontinue drain this morning and intravenous pain medications.  Start oral pain medications.  Probable discharge home April 2, 2020          Cholo Mena MD  Orthopedics  [facility]  "

## 2020-04-01 NOTE — PT/OT/SLP EVAL
"Occupational Therapy   Evaluation    Name: Josh Coulter  MRN: [id number]  Admitting Diagnosis:  Lumbar stenosis with neurogenic claudication 1 Day Post-Op    Recommendations:     Discharge Recommendations: home health PT, home health OT  Discharge Equipment Recommendations:  walker, rolling, bedside commode, hip kit  Barriers to discharge:  None    Assessment:     Josh Coulter is a 49 y.o. male with a medical diagnosis of Lumbar stenosis with neurogenic claudication.  He presents with persistent dizziness with bed mobility and when ambulating with RW. Prior to activity, pt's BP was 109/68. After sitting EOB and ambulating 5ft to and from bed, pt's BP was 105/53. Patient ambulated in room with CGA and RW. Patient ambulated senior living to bathroom, then had to turn back and ambulate back to bed 2/2 increased dizziness. Unable to educate patient on adaptive equipment with LB dressing 2/2 dizziness. Performance deficits affecting function: weakness, impaired endurance, impaired self care skills, impaired functional mobilty, gait instability, decreased coordination, impaired balance, orthopedic precautions.      Rehab Prognosis: Good; patient would benefit from acute skilled OT services to address these deficits and reach maximum level of function.       Plan:     Patient to be seen 4 x/week to address the above listed problems via self-care/home management, therapeutic activities, therapeutic exercises  · Plan of Care Expires: 04/15/20  · Plan of Care Reviewed with: patient    Subjective     Chief Complaint: dizziness  Patient/Family Comments/goals: "I'm trying to do too much."    Occupational Profile:  Living Environment: Patient lives with his 25 y/o special needs son.  Previous level of function: Patient was independent with ADLs and mobility.   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  Equipment Used at Home:  cane, straight, wheelchair  Assistance upon Discharge: Patient stated he has " another older son that lives next door that can provide assist with self care.    Pain/Comfort:  · Pain Rating 1: (did not rate)  · Location - Side 1: Bilateral  · Location - Orientation 1: lower  · Location 1: lumbar spine  · Pain Addressed 1: Reposition, Distraction, Cessation of Activity, Nurse notified  · Pain Rating Post-Intervention 1: (did not rate)    Patients cultural, spiritual, Congregation conflicts given the current situation:      Objective:     Communicated with: nurse Bing prior to session.  Patient found HOB elevated with CHRISTOPH drain, peripheral IV, telemetry upon OT entry to room.    General Precautions: Standard, fall   Orthopedic Precautions:N/A   Braces: N/A     Occupational Performance:    Bed Mobility:    · Patient completed Scooting/Bridging with stand by assistance  · Patient completed Supine to Sit with stand by assistance  · Patient completed Sit to Supine with stand by assistance    Functional Mobility/Transfers:  · Patient completed Sit <> Stand Transfer with contact guard assistance  with  rolling walker   · Functional Mobility: Pt stood from bed and ambulated with RW requiring CGA. Pt's distance limited 2/2 increased dizziness. Pt only able to ambulate half the distance from bed to bathroom.    Activities of Daily Living:  · Lower Body Dressing: maximal assistance to don/doff socks while seated EOB.    Cognitive/Visual Perceptual:  Cognitive/Psychosocial Skills:     -       Oriented to: x4   -       Follows Commands/attention:Follows multistep  commands  -       Communication: clear/fluent  -       Safety awareness/insight to disability: intact   -       Mood/Affect/Coping skills/emotional control: Appropriate to situation and Cooperative  Visual/Perceptual:      -Intact     Physical Exam:  Postural examination/scapula alignment:    -       Rounded shoulders  -       Forward head  Upper Extremity Range of Motion:     -       Right Upper Extremity: WFL  -       Left Upper Extremity:  WFL  Upper Extremity Strength:    -       Right Upper Extremity: WFL  -       Left Upper Extremity: WFL   Strength:    -       Right Upper Extremity: WFL  -       Left Upper Extremity: WFL  Fine Motor Coordination:    -       Intact  Gross motor coordination:   WFL in B UE    AMPAC 6 Click ADL:  AMPAC Total Score: 15    Treatment & Education:  OT ed patient on safety with walker use for functional mobility with cues for hand placement & sequencing.   OT ed pt on OT role & POC as well as discharge recommendations.  OT ed pt on spinal precautions with demonstration provide and pt verbalized understanding.    Education:    Patient left right sidelying with all lines intact, call button in reach and bed alarm on    GOALS:   Multidisciplinary Problems     Occupational Therapy Goals        Problem: Occupational Therapy Goal    Goal Priority Disciplines Outcome Interventions   Occupational Therapy Goal     OT, PT/OT Ongoing, Progressing    Description:  Goals to be met by: 4/15/2020     Patient will increase functional independence with ADLs by performing:    LE Dressing with Supervision and Assistive Devices as needed.  Grooming while standing at sink with Supervision.  Toileting from toilet with Supervision for hygiene and clothing management.   Supine to sit with [place].  Toilet transfer to toilet with Supervision.                      History:     Past Medical History:   Diagnosis Date    Hyponatremia     Syncope        Past Surgical History:   Procedure Laterality Date    ANTERIOR LUMBAR INTERBODY FUSION (ALIF) N/A 3/31/2020    Procedure: FUSION, SPINE, LUMBAR, ALIF L4-5;  Surgeon: Cholo Mena MD;  Location: [facility] OR;  Service: Orthopedics;  Laterality: N/A;    BONE GRAFT N/A 3/31/2020    Procedure: BONE GRAFT;  Surgeon: Cholo Mena MD;  Location: [facility] OR;  Service: Orthopedics;  Laterality: N/A;    FUSION OF SPINE WITH INSTRUMENTATION N/A 3/31/2020    Procedure: FUSION, SPINE, WITH  INSTRUMENTATION L4-5;  Surgeon: Cholo Mena MD;  Location: [facility];  Service: Orthopedics;  Laterality: N/A;  [employer], [employer], DR DAMON, CO-SURGEON    HERNIA REPAIR      SPINE SURGERY  2009       Time Tracking:     OT Date of Treatment: 04/01/20  OT Start Time: 1057  OT Stop Time: 1115  OT Total Time (min): 18 min    Billable Minutes:Evaluation 9  Self Care/Home Management 9    Edy Rankin, OT  4/1/2020

## 2020-04-01 NOTE — RESPIRATORY THERAPY
03/31/20 2055   Patient Assessment/Suction   Level of Consciousness (AVPU) alert   Respiratory Effort Unlabored   PRE-TX-O2   O2 Device (Oxygen Therapy) nasal cannula   $ Is the patient on Low Flow Oxygen? Yes   Flow (L/min) 3   Oxygen Analyzed Concentration (%) 100 %   Pulse 101   ETCO2   $ ETCO2 Charge   (not in use at this time)

## 2020-04-01 NOTE — PT/OT/SLP PROGRESS
Physical Therapy Treatment    Patient Name:  Josh Coulter   MRN:  [id number]    Recommendations:     Discharge Recommendations:  home   Discharge Equipment Recommendations: none   Barriers to discharge: None    Assessment:     Josh Coulter is a 49 y.o. male admitted with a medical diagnosis of Lumbar stenosis with neurogenic claudication.  He presents with the following impairments/functional limitations:  weakness, impaired endurance, gait instability, pain, decreased lower extremity function, impaired functional mobilty, impaired balance, decreased ROM . Patient readily agreeable to PT treatment this afternoon to include gait training.  Patient presented supine in bed and was able to transfer supine to sit and sit to stand with SBA.  Patient BP monitored in sitting/standing and did go down to 95/57 but patient reported not feeling dizzy at all with gait this afternoon. Patient able to ambulate 250 feet but did need a RW for safety so will probably need one for home use upon discharge.    Rehab Prognosis: Good; patient would benefit from acute skilled PT services to address these deficits and reach maximum level of function.    Recent Surgery: Procedure(s) (LRB):  LAMINECTOMY, SPINE, LUMBAR, FOR DECOMPRESSION L4 (Bilateral)  FUSION, SPINE, WITH INSTRUMENTATION L4-5 (N/A)  FUSION, SPINE, LUMBAR, ALIF L4-5 (N/A)  BONE GRAFT (N/A) 1 Day Post-Op    Plan:     During this hospitalization, patient to be seen BID to address the identified rehab impairments via gait training, therapeutic activities, therapeutic exercises and progress toward the following goals:    · Plan of Care Expires:  05/06/20    Subjective     Chief Complaint: none given  Patient/Family Comments/goals: go home tomorrow  Pain/Comfort:  · Pain Rating 1: 3/10  · Location - Side 1: Bilateral  · Location - Orientation 1: posterior  · Location 1: lumbar spine  · Pain Addressed 1: Cessation of Activity, Reposition  · Pain Rating Post-Intervention 1:  5/10      Objective:     Communicated with nurse prior to session.  Patient found supine with bed alarm, telemetry, peripheral IV, CHRISTOPH drain upon PT entry to room.     General Precautions: Standard, fall   Orthopedic Precautions:    Braces:       Functional Mobility:  · Bed Mobility:     · Supine to Sit: supervision  · Sit to Supine: supervision  · Transfers:     · Sit to Stand:  supervision with rolling walker  · Gait: x 250 feet with RW with SBA      AM-PAC 6 CLICK MOBILITY  Turning over in bed (including adjusting bedclothes, sheets and blankets)?: 3  Sitting down on and standing up from a chair with arms (e.g., wheelchair, bedside commode, etc.): 3  Moving from lying on back to sitting on the side of the bed?: 3  Moving to and from a bed to a chair (including a wheelchair)?: 3  Need to walk in hospital room?: 1  Climbing 3-5 steps with a railing?: 1  Basic Mobility Total Score: 14       Therapeutic Activities and Exercises:   gait training x 250 feet with RW with CGA for safety and patient did not feel dizzy at all during gait this afternoon    Patient left supine with call button in reach and bed alarm on..    GOALS:   Multidisciplinary Problems     Physical Therapy Goals        Problem: Physical Therapy Goal    Goal Priority Disciplines Outcome Goal Variances Interventions   Physical Therapy Goal     PT, PT/OT Ongoing, Progressing     Description:  Goals to be met by: 2020    Patient will increase functional independence with mobility by performin. Supine to sit with [place]  2. Sit to supine with [place]  3. Sit to stand transfer with [place]  4. Bed to chair transfer with Modified [place] using Single-point Cane   5. Gait  x 150 feet with Supervision using Single-point Cane .                       Time Tracking:     PT Received On: 20  PT Start Time: 1308     PT Stop Time: 1321  PT Total Time (min): 13 min     Billable Minutes: Gait Training 13    Treatment Type:  Treatment  PT/PTA: PT     PTA Visit Number: 0     Chris Megilligan, PT  04/01/2020

## 2020-04-01 NOTE — SUBJECTIVE & OBJECTIVE
"Principal Problem:Lumbar stenosis with neurogenic claudication    Principal Orthopedic Problem:      Interval History:  Postoperative status    Review of patient's allergies indicates:   Allergen Reactions    Contrast media Other (See Comments)     syncopy       Current Facility-Administered Medications   Medication    acetaminophen (10 mg/mL) injection 1,000 mg    acetaminophen tablet 650 mg    aluminum-magnesium hydroxide-simethicone 200-200-20 mg/5 mL suspension 30 mL    bisacodyL suppository 10 mg    cefazolin (ANCEF) 2 gram in dextrose 5% 50 mL IVPB (premix)    clonazePAM tablet 0.5 mg    cyclobenzaprine tablet 10 mg    diphenhydrAMINE capsule 50 mg    diphenhydrAMINE injection 12.5 mg    docusate sodium capsule 100 mg    electrolyte-S (ISOLYTE)    electrolyte-S (ISOLYTE)    hydromorphone (PF) injection 1 mg    hydromorphone (PF) injection 2 mg    HYDROmorphone PCA syringe 6 mg/30 mL (0.2 mg/mL) NS    lactated ringers infusion    lactated ringers infusion    metoclopramide HCl injection 10 mg    mupirocin 2 % ointment 1 g    naloxone 0.4 mg/mL injection 0.04 mg    ondansetron disintegrating tablet 8 mg    ondansetron injection 8 mg    oxyCODONE immediate release tablet 10 mg    oxyCODONE immediate release tablet 15 mg    oxyCODONE immediate release tablet 5 mg    promethazine (PHENERGAN) 6.25 mg in dextrose 5 % 50 mL IVPB    promethazine (PHENERGAN) 6.25 mg in dextrose 5 % 50 mL IVPB    senna-docusate 8.6-50 mg per tablet 2 tablet     Objective:     Vital Signs (Most Recent):  Temp: 97.2 °F (36.2 °C) (04/01/20 0726)  Pulse: 101 (04/01/20 0730)  Resp: 14 (04/01/20 0730)  BP: 110/70 (04/01/20 0726)  SpO2: 99 % (04/01/20 0730) Vital Signs (24h Range):  Temp:  [97.2 °F (36.2 °C)-98.5 °F (36.9 °C)] 97.2 °F (36.2 °C)  Pulse:  [] 101  Resp:  [14-18] 14  SpO2:  [98 %-100 %] 99 %  BP: ()/(52-85) 110/70     Weight: 74.8 kg (165 lb)  Height: 6' 4" (193 cm)  Body mass index is 20.08 " kg/m².      Intake/Output Summary (Last 24 hours) at 4/1/2020 0820  Last data filed at 3/31/2020 1600  Gross per 24 hour   Intake 2700 ml   Output 2150 ml   Net 550 ml       General    Vitals reviewed.  Constitutional: He is oriented to person, place, and time.   Pulmonary/Chest: Effort normal.   Abdominal: Soft.   Neurological: He is alert and oriented to person, place, and time.   Psychiatric: He has a normal mood and affect.         Back (L-Spine & T-Spine) / Neck (C-Spine) Exam     Comments:  Motor function all major muscle groups intact.  Drain in place.        Significant Labs: All pertinent labs within the past 24 hours have been reviewed.    Significant Imaging: None

## 2020-04-01 NOTE — RESPIRATORY THERAPY
04/01/20 0730   Patient Assessment/Suction   Level of Consciousness (AVPU) alert   All Lung Fields Breath Sounds diminished   PRE-TX-O2   O2 Device (Oxygen Therapy) nasal cannula   $ Is the patient on Low Flow Oxygen? Yes   Flow (L/min) 2   SpO2 99 %   Pulse 101   Resp 14   ETCO2   $ ETCO2 Charge Exhaled CO2 Monitoring   $ ETCO2 Usage Currently wearing   ETCO2 (mmHg) 31 mmHg   Incentive Spirometer   $ Incentive Spirometer Charges done with encouragement   Incentive Spirometer Predicted Level (mL) 3450   Administration (IS) instruction provided, initial   Number of Repetitions (IS) 10   Level Incentive Spirometer (mL) 1500   Patient Tolerance (IS) good

## 2020-04-01 NOTE — ASSESSMENT & PLAN NOTE
Begin ambulation progress diet as tolerated.  Discontinue drain this morning and intravenous pain medications.  Start oral pain medications.  Probable discharge home April 2, 2020

## 2020-04-02 VITALS
HEIGHT: 76 IN | TEMPERATURE: 98 F | OXYGEN SATURATION: 96 % | HEART RATE: 125 BPM | SYSTOLIC BLOOD PRESSURE: 120 MMHG | RESPIRATION RATE: 16 BRPM | BODY MASS INDEX: 20.09 KG/M2 | WEIGHT: 165 LBS | DIASTOLIC BLOOD PRESSURE: 63 MMHG

## 2020-04-02 DIAGNOSIS — Z98.1 S/P LUMBAR FUSION: Primary | ICD-10-CM

## 2020-04-02 LAB
ANION GAP SERPL CALC-SCNC: 6 MMOL/L (ref 8–16)
BASOPHILS # BLD AUTO: 0.02 K/UL (ref 0–0.2)
BASOPHILS NFR BLD: 0.3 % (ref 0–1.9)
BUN SERPL-MCNC: 3 MG/DL (ref 6–20)
CALCIUM SERPL-MCNC: 7.9 MG/DL (ref 8.7–10.5)
CHLORIDE SERPL-SCNC: 88 MMOL/L (ref 95–110)
CO2 SERPL-SCNC: 29 MMOL/L (ref 23–29)
CREAT SERPL-MCNC: 0.7 MG/DL (ref 0.5–1.4)
DIFFERENTIAL METHOD: ABNORMAL
EOSINOPHIL # BLD AUTO: 0 K/UL (ref 0–0.5)
EOSINOPHIL NFR BLD: 0.2 % (ref 0–8)
ERYTHROCYTE [DISTWIDTH] IN BLOOD BY AUTOMATED COUNT: 12.5 % (ref 11.5–14.5)
EST. GFR  (AFRICAN AMERICAN): >60 ML/MIN/1.73 M^2
EST. GFR  (NON AFRICAN AMERICAN): >60 ML/MIN/1.73 M^2
GLUCOSE SERPL-MCNC: 130 MG/DL (ref 70–110)
HCT VFR BLD AUTO: 23.1 % (ref 40–54)
HGB BLD-MCNC: 8.1 G/DL (ref 14–18)
IMM GRANULOCYTES # BLD AUTO: 0.02 K/UL (ref 0–0.04)
IMM GRANULOCYTES NFR BLD AUTO: 0.3 % (ref 0–0.5)
LYMPHOCYTES # BLD AUTO: 0.9 K/UL (ref 1–4.8)
LYMPHOCYTES NFR BLD: 14.1 % (ref 18–48)
MCH RBC QN AUTO: 34 PG (ref 27–31)
MCHC RBC AUTO-ENTMCNC: 35.1 G/DL (ref 32–36)
MCV RBC AUTO: 97 FL (ref 82–98)
MONOCYTES # BLD AUTO: 0.6 K/UL (ref 0.3–1)
MONOCYTES NFR BLD: 9.5 % (ref 4–15)
NEUTROPHILS # BLD AUTO: 4.5 K/UL (ref 1.8–7.7)
NEUTROPHILS NFR BLD: 75.6 % (ref 38–73)
NRBC BLD-RTO: 0 /100 WBC
PLATELET # BLD AUTO: 86 K/UL (ref 150–350)
PMV BLD AUTO: 11.1 FL (ref 9.2–12.9)
POTASSIUM SERPL-SCNC: 4 MMOL/L (ref 3.5–5.1)
RBC # BLD AUTO: 2.38 M/UL (ref 4.6–6.2)
SODIUM SERPL-SCNC: 123 MMOL/L (ref 136–145)
WBC # BLD AUTO: 6.01 K/UL (ref 3.9–12.7)

## 2020-04-02 PROCEDURE — 99900035 HC TECH TIME PER 15 MIN (STAT)

## 2020-04-02 PROCEDURE — 85025 COMPLETE CBC W/AUTO DIFF WBC: CPT

## 2020-04-02 PROCEDURE — 63600175 PHARM REV CODE 636 W HCPCS: Performed by: PHYSICIAN ASSISTANT

## 2020-04-02 PROCEDURE — 94761 N-INVAS EAR/PLS OXIMETRY MLT: CPT

## 2020-04-02 PROCEDURE — 94799 UNLISTED PULMONARY SVC/PX: CPT

## 2020-04-02 PROCEDURE — 36415 COLL VENOUS BLD VENIPUNCTURE: CPT

## 2020-04-02 PROCEDURE — 25000003 PHARM REV CODE 250: Performed by: PHYSICIAN ASSISTANT

## 2020-04-02 PROCEDURE — 25000003 PHARM REV CODE 250: Performed by: ORTHOPAEDIC SURGERY

## 2020-04-02 PROCEDURE — 80048 BASIC METABOLIC PNL TOTAL CA: CPT

## 2020-04-02 RX ORDER — HYDROCODONE BITARTRATE AND ACETAMINOPHEN 10; 325 MG/1; MG/1
1 TABLET ORAL EVERY 4 HOURS PRN
Qty: 84 TABLET | Refills: 0 | Status: SHIPPED | OUTPATIENT
Start: 2020-04-02 | End: 2020-04-15 | Stop reason: SDUPTHER

## 2020-04-02 RX ADMIN — CLONAZEPAM 0.5 MG: 0.5 TABLET ORAL at 04:04

## 2020-04-02 RX ADMIN — MUPIROCIN 1 G: 20 OINTMENT TOPICAL at 09:04

## 2020-04-02 RX ADMIN — HYDROMORPHONE HYDROCHLORIDE 2 MG: 2 INJECTION, SOLUTION INTRAMUSCULAR; INTRAVENOUS; SUBCUTANEOUS at 02:04

## 2020-04-02 RX ADMIN — HYDROCODONE BITARTRATE AND ACETAMINOPHEN 1 TABLET: 10; 325 TABLET ORAL at 09:04

## 2020-04-02 NOTE — PROGRESS NOTES
[facility]  Orthopedics  Progress Note    Patient Name: Josh Coulter  MRN: [id number]  Admission Date: 3/31/2020  Hospital Length of Stay: 2 days  Attending Provider: Cholo Mena MD  Primary Care Provider: JORGE Ellsworth  Follow-up For: Procedure(s) (LRB):  LAMINECTOMY, SPINE, LUMBAR, FOR DECOMPRESSION L4 (Bilateral)  FUSION, SPINE, WITH INSTRUMENTATION L4-5 (N/A)  FUSION, SPINE, LUMBAR, ALIF L4-5 (N/A)  BONE GRAFT (N/A)    Post-Operative Day: 2 Days Post-Op  Subjective:     Principal Problem:Lumbar stenosis with neurogenic claudication    Principal Orthopedic Problem: S/P APLIF L4-5    Interval History: none    Review of patient's allergies indicates:   Allergen Reactions    Contrast media Other (See Comments)     syncopy       Current Facility-Administered Medications   Medication    acetaminophen tablet 650 mg    aluminum-magnesium hydroxide-simethicone 200-200-20 mg/5 mL suspension 30 mL    bisacodyL suppository 10 mg    clonazePAM tablet 0.5 mg    cyclobenzaprine tablet 10 mg    diphenhydrAMINE capsule 50 mg    diphenhydrAMINE injection 12.5 mg    docusate sodium capsule 100 mg    electrolyte-S (ISOLYTE)    electrolyte-S (ISOLYTE)    HYDROcodone-acetaminophen  mg per tablet 1 tablet    hydromorphone (PF) injection 1 mg    hydromorphone (PF) injection 2 mg    mupirocin 2 % ointment 1 g    ondansetron disintegrating tablet 8 mg    ondansetron injection 8 mg    promethazine (PHENERGAN) 6.25 mg in dextrose 5 % 50 mL IVPB    promethazine (PHENERGAN) 6.25 mg in dextrose 5 % 50 mL IVPB    senna-docusate 8.6-50 mg per tablet 2 tablet     Objective:     Vital Signs (Most Recent):  Temp: 97.6 °F (36.4 °C) (04/02/20 0810)  Pulse: 105 (04/02/20 0810)  Resp: 16 (04/02/20 0810)  BP: 120/63 (04/02/20 0810)  SpO2: 96 % (04/02/20 0810) Vital Signs (24h Range):  Temp:  [96.7 °F (35.9 °C)-98.2 °F (36.8 °C)] 97.6 °F (36.4 °C)  Pulse:  [105-119] 105  Resp:  [16-18] 16  SpO2:   "[95 %-98 %] 96 %  BP: ()/(56-69) 120/63     Weight: 74.8 kg (165 lb)  Height: 6' 4" (193 cm)  Body mass index is 20.08 kg/m².      Intake/Output Summary (Last 24 hours) at 4/2/2020 0838  Last data filed at 4/2/2020 0500  Gross per 24 hour   Intake 800 ml   Output 900 ml   Net -100 ml       General    Nursing note and vitals reviewed.  Constitutional: He is oriented to person, place, and time. He appears well-developed and well-nourished.   Pulmonary/Chest: Effort normal.   Abdominal: Soft. Bowel sounds are normal. He exhibits no distension. There is no tenderness.   Incision looks good   Neurological: He is alert and oriented to person, place, and time.   Psychiatric: He has a normal mood and affect. His behavior is normal.         Back (L-Spine & T-Spine) / Neck (C-Spine) Exam     Comments:  Altered gait with SC. Post lumbar incision C/D/I. BLEs DNVI.        Significant Labs:   CBC:   Recent Labs   Lab 03/31/20  1458 04/01/20  0451 04/02/20  0503   WBC 9.38 7.84 6.01   HGB 12.2* 10.2* 8.1*   HCT 34.4* 30.4* 23.1*   PLT 92* 92* 86*     CMP:   Recent Labs   Lab 03/31/20  1458 04/01/20  0451 04/02/20  0503   * 126* 123*   K 4.1 4.7 4.0   CL 88* 91* 88*   CO2 20* 25 29   GLU 99 133* 130*   BUN 3* 3* 3*   CREATININE 0.7 0.8 0.7   CALCIUM 7.5* 8.0* 7.9*   ANIONGAP 12 10 6*   EGFRNONAA >60 >60 >60     All pertinent labs within the past 24 hours have been reviewed.    Significant Imaging: None    Assessment/Plan:     Spondylolisthesis of lumbar region  Dressing change now  DC home today  - HH not covered by Medicaid  - Pt to ambulate at home at least 4x a day or more if tolerated  - Keep incision clean and dry but may shower in 48h (replace dressing after shower and drying incision)          JP MEMBRENO  Orthopedics  [facility]  "

## 2020-04-02 NOTE — PLAN OF CARE
04/02/20 1226   Final Note   Assessment Type Final Discharge Note   Anticipated Discharge Disposition Home   Hospital Follow Up  Appt(s) scheduled? Yes

## 2020-04-02 NOTE — RESPIRATORY THERAPY
04/02/20 0842   Patient Assessment/Suction   Level of Consciousness (AVPU) alert   Respiratory Effort Unlabored   PRE-TX-O2   O2 Device (Oxygen Therapy) room air   SpO2 96 %   Pulse Oximetry Type Intermittent   $ Pulse Oximetry - Multiple Charge Pulse Oximetry - Multiple   Pulse (!) 125   Resp 16   Incentive Spirometer   $ Incentive Spirometer Charges done with encouragement;proper technique demonstrated   Administration (IS) mouthpiece;proper technique demonstrated   Number of Repetitions (IS) 10   Level Incentive Spirometer (mL) 1500   Patient Tolerance (IS) good

## 2020-04-02 NOTE — PLAN OF CARE
Approval from Michelle Michaels with [facility] to pull and deliver the pts BSC and RW. I broguht to second floor and pts nurse signed paperwork. Completed paperwork returned to the DME closet. Lorraine Chaves, MICHELLEW     04/02/20 1131   Post-Acute Status   Post-Acute Authorization HME   [facility] Status Set-up Complete

## 2020-04-02 NOTE — DISCHARGE INSTRUCTIONS
Ambulation at least 4x a day or more if tolerated  No lifting  Can remove bandages in 48 to shower. Then replace dressing and keep incision clean and dry.

## 2020-04-02 NOTE — PLAN OF CARE
Patient alert and oriented resting in bed. NAD. C/o pain and prn pain meds given with relief. Denies SOB. VSS. Ambulated to the bathroom. Urinal at bedside. Bed alarm active. Plan of care reviewed with patient. Verbalizes understanding.Call light in reach. Pt free from fall or injury. Will monitor.

## 2020-04-02 NOTE — ASSESSMENT & PLAN NOTE
Dressing change now  DC home today  - HH not covered by Medicaid  - Pt to ambulate at home at least 4x a day or more if tolerated  - Keep incision clean and dry but may shower in 48h (replace dressing after shower and drying incision)

## 2020-04-02 NOTE — PLAN OF CARE
Pt already has post op apt scheduled      04/02/20 1226   Discharge Assessment   Assessment Type Discharge Planning Reassessment

## 2020-04-02 NOTE — DISCHARGE SUMMARY
"[facility]  Orthopedics  Discharge Summary      Patient Name: Josh Coulter  MRN: [id number]  Admission Date: 3/31/2020  Hospital Length of Stay: 2 days  Discharge Date and Time:  04/02/2020 10:53 AM  Attending Physician: Cholo Mena MD   Discharging Provider: JP MEMBRENO  Primary Care Provider: JORGE Ellsworth    HPI: S/P L4-5 APLIF    Procedure(s) (LRB):  LAMINECTOMY, SPINE, LUMBAR, FOR DECOMPRESSION L4 (Bilateral)  FUSION, SPINE, WITH INSTRUMENTATION L4-5 (N/A)  FUSION, SPINE, LUMBAR, ALIF L4-5 (N/A)  BONE GRAFT (N/A)      Hospital Course: uneventful    Consults (From admission, onward)        Status Ordering Provider     Anesthesia consult for PCA management  Once     Provider:  (Not yet assigned)    Acknowledged DEJA JEFFERSON     Inpatient consult to Social Work  Once     Provider:  (Not yet assigned)    Completed DEJA JEFFERSON          Significant Diagnostic Studies: No pertinent studies.    Pending Diagnostic Studies:     None        Final Active Diagnoses:    Diagnosis Date Noted POA    PRINCIPAL PROBLEM:  Lumbar stenosis with neurogenic claudication [M48.062] 03/31/2020 Yes    Spondylolisthesis of lumbar region [M43.16] 11/22/2019 Yes      Problems Resolved During this Admission:      Discharged Condition: good    Disposition: Home or Self Care    Follow Up:  Follow-up Information     Please follow up.    Why:  scheduled post-op appt           Cholo Mena MD.    Specialties:  Orthopedic Surgery, Surgery  Contact information:  [address] [place] [id number] [phone]                 Patient Instructions:      COMMODE FOR HOME USE     Order Specific Question Answer Comments   Type: Standard    Height: 6' 4" (1.93 m)    Weight: 74.8 kg (165 lb)    Does patient have medical equipment at home? cane, straight    Length of need (1-99 months): 99    Vendor: [facility]    Expected Date of Delivery: 4/1/2020      WALKER FOR HOME USE " "    Order Specific Question Answer Comments   Type of Walker: Adult (5'4"-6'6")    With wheels? Yes    Height: 6' 4" (1.93 m)    Weight: 74.8 kg (165 lb)    Length of need (1-99 months): 99    Does patient have medical equipment at home? cane, straight    Please check all that apply: Patient's condition impairs ambulation.    Vendor: [facility]   Expected Date of Delivery: 4/1/2020      Medications: REsume all home meds. [place] 10/325 q4h prn post-op pain  Reconciled Home Medications:      Medication List      START taking these medications    * HYDROcodone-acetaminophen  mg per tablet  Commonly known as:  NORCO  Take 1 tablet by mouth every 4 (four) hours as needed for Pain.         * This list has 1 medication(s) that are the same as other medications prescribed for you. Read the directions carefully, and ask your doctor or other care provider to review them with you.            CONTINUE taking these medications    clonazePAM 0.5 MG tablet  Commonly known as:  KLONOPIN  Take 1 tablet (0.5 mg total) by mouth 3 (three) times daily as needed for Anxiety.     cyclobenzaprine 5 MG tablet  Commonly known as:  FLEXERIL  Take 2 tablets (10 mg total) by mouth 3 (three) times daily as needed for Muscle spasms.     sildenafil 20 mg Tab  Commonly known as:  REVATIO  Take 1 tablet (20 mg total) by mouth daily as needed (erectile dysfunction).        ASK your doctor about these medications    * HYDROcodone-acetaminophen 7.5-325 mg per tablet  Commonly known as:  NORCO  Take 1 tablet by mouth every 6 (six) hours as needed for Pain.  Ask about: Should I take this medication?         * This list has 1 medication(s) that are the same as other medications prescribed for you. Read the directions carefully, and ask your doctor or other care provider to review them with you.                JP MEMBRENO  Orthopedics  [facility]  "

## 2020-04-02 NOTE — NURSING
Discharge instructions reviewed with patient. Patient verbalized understanding. Peripheral IV removed, catheter intact. Belongings packed per patient. BSC and walker delivered to patients room prior to discharge.  Patient escorted from unit via wheel chair by patient transporter Azalea sherwood at this time.

## 2020-04-02 NOTE — PT/OT/SLP PROGRESS
Physical Therapy      Patient Name:  Josh Coulter   MRN:  [id number]    Patient not seen today secondary to patient sitting EOB stating he is having 7/10 pain waiting to get his pain medicine and is then being discharged. Will follow-up this afternoon if patient not discharged.    Dora Jc, PT

## 2020-04-02 NOTE — SUBJECTIVE & OBJECTIVE
"Principal Problem:Lumbar stenosis with neurogenic claudication    Principal Orthopedic Problem: S/P APLIF L4-5    Interval History: none    Review of patient's allergies indicates:   Allergen Reactions    Contrast media Other (See Comments)     syncopy       Current Facility-Administered Medications   Medication    acetaminophen tablet 650 mg    aluminum-magnesium hydroxide-simethicone 200-200-20 mg/5 mL suspension 30 mL    bisacodyL suppository 10 mg    clonazePAM tablet 0.5 mg    cyclobenzaprine tablet 10 mg    diphenhydrAMINE capsule 50 mg    diphenhydrAMINE injection 12.5 mg    docusate sodium capsule 100 mg    electrolyte-S (ISOLYTE)    electrolyte-S (ISOLYTE)    HYDROcodone-acetaminophen  mg per tablet 1 tablet    hydromorphone (PF) injection 1 mg    hydromorphone (PF) injection 2 mg    mupirocin 2 % ointment 1 g    ondansetron disintegrating tablet 8 mg    ondansetron injection 8 mg    promethazine (PHENERGAN) 6.25 mg in dextrose 5 % 50 mL IVPB    promethazine (PHENERGAN) 6.25 mg in dextrose 5 % 50 mL IVPB    senna-docusate 8.6-50 mg per tablet 2 tablet     Objective:     Vital Signs (Most Recent):  Temp: 97.6 °F (36.4 °C) (04/02/20 0810)  Pulse: 105 (04/02/20 0810)  Resp: 16 (04/02/20 0810)  BP: 120/63 (04/02/20 0810)  SpO2: 96 % (04/02/20 0810) Vital Signs (24h Range):  Temp:  [96.7 °F (35.9 °C)-98.2 °F (36.8 °C)] 97.6 °F (36.4 °C)  Pulse:  [105-119] 105  Resp:  [16-18] 16  SpO2:  [95 %-98 %] 96 %  BP: ()/(56-69) 120/63     Weight: 74.8 kg (165 lb)  Height: 6' 4" (193 cm)  Body mass index is 20.08 kg/m².      Intake/Output Summary (Last 24 hours) at 4/2/2020 0838  Last data filed at 4/2/2020 0500  Gross per 24 hour   Intake 800 ml   Output 900 ml   Net -100 ml       General    Nursing note and vitals reviewed.  Constitutional: He is oriented to person, place, and time. He appears well-developed and well-nourished.   Pulmonary/Chest: Effort normal.   Abdominal: Soft. Bowel " sounds are normal. He exhibits no distension. There is no tenderness.   Incision looks good   Neurological: He is alert and oriented to person, place, and time.   Psychiatric: He has a normal mood and affect. His behavior is normal.         Back (L-Spine & T-Spine) / Neck (C-Spine) Exam     Comments:  Altered gait with SC. Post lumbar incision C/D/I. BLEs DNVI.        Significant Labs:   CBC:   Recent Labs   Lab 03/31/20  1458 04/01/20  0451 04/02/20  0503   WBC 9.38 7.84 6.01   HGB 12.2* 10.2* 8.1*   HCT 34.4* 30.4* 23.1*   PLT 92* 92* 86*     CMP:   Recent Labs   Lab 03/31/20  1458 04/01/20  0451 04/02/20  0503   * 126* 123*   K 4.1 4.7 4.0   CL 88* 91* 88*   CO2 20* 25 29   GLU 99 133* 130*   BUN 3* 3* 3*   CREATININE 0.7 0.8 0.7   CALCIUM 7.5* 8.0* 7.9*   ANIONGAP 12 10 6*   EGFRNONAA >60 >60 >60     All pertinent labs within the past 24 hours have been reviewed.    Significant Imaging: None

## 2020-04-02 NOTE — RESPIRATORY THERAPY
04/01/20 2017   Patient Assessment/Suction   Level of Consciousness (AVPU) alert   PRE-TX-O2   O2 Device (Oxygen Therapy) nasal cannula   Flow (L/min) 2   Oxygen Concentration (%) 28   SpO2 98 %   Pulse Oximetry Type Intermittent   Incentive Spirometer   $ Incentive Spirometer Charges done with encouragement   Administration (IS) proper technique demonstrated   Number of Repetitions (IS) 10   Level Incentive Spirometer (mL) 1500   Patient Tolerance (IS) good   Ready to Wean/Extubation Screen   FIO2<=50 (chart decimal) 0.28

## 2020-04-03 ENCOUNTER — TELEPHONE (OUTPATIENT)
Dept: FAMILY MEDICINE | Facility: CLINIC | Age: 50
End: 2020-04-03

## 2020-04-03 NOTE — TELEPHONE ENCOUNTER
----- Message from Hedy Porter sent at 4/3/2020  9:24 AM CDT -----  Contact: Patient [phone]  Will Dr Mena send order for him to have a shower chair? Dr Mena

## 2020-04-03 NOTE — PHYSICIAN QUERY
"PT Name: Josh Coulter  MR #: [id number]    Physician Query Form - Hematology Clarification      CDS/: Lori Chang               Contact information:[email or web address]    This form is a permanent document in the medical record.      Query Date: April 3, 2020    By submitting this query, we are merely seeking further clarification of documentation. Please utilize your independent clinical judgment when addressing the question(s) below.    The Medical record contains the following:   Indicators  Supporting Clinical Findings Location in Medical Record    "Anemia" documented      x H & H =  13.6 7 37.2 on 3/31/2020 @ 0746     12.2 & 34.4 on 3/31/2020 @ 1458     10.2 & 30.4 on 4/1/2020     8.1 & 23.1 on 4/2/2020    Lab Results 3/31, 4/1 & 4/2/2020    x BP =                     HR=  BP: ()/(56-69)      Pulse:  [105-119]   Orthopedic Surgery Progress Notes 4/2/2020    "GI bleeding" documented      Acute bleeding (Non GI site)      Transfusion(s)      x Treatment:  Daily CBC  Lab Results 3/31, 4/1 & 4/2/2020    x Other:      Procedure:   1.  Anterior lumbar interbody fusion L4-5 level CPT code 21534-75  2.  Insertion of intervertebral prosthetic interbody device L4-5 level CPT code 47449  3.  L4 laminectomy for spondylolisthesis CPT code 71687  Posterolateral fusion L4-5 level CPT code 33660  4.  Posterior nonsegmental instrumentation L4-5 level using Medtronic titanium pedicle screw and mike system CPT code 44151  5.  Removal of posterior segmental instrumentation L5-S1 level CPT code 41515  6.  Harvesting local autograft through same incision for spinal fusion CPT code 82623  7.  Computer assisted spinal navigation for insertion of pedicle screws CPT code 12613       EBL; 400 mL        Patient eagerly agreeable to PT evaluation but did report feeling very dizzy when sitting and standing.  Patient presented supine in bed and was able to transfer supine to sit with SBA and sit to stand with CGA but then became too " dizzy to stand long or attempt to walk.  Patient stood 3 times but had same symptoms each time so did not ambulate this session.  Patient BP monitored as follows:  After first stand 75/41, after sitting for several minutes 91/64 and after second attempt to stand 86/47 so patient returned to bed and nurse informed.  Patient reported extreme dizziness each time standing so gait not attempt.    Patient ambulated FPC to bathroom, then had to turn back and ambulate back to bed 2/2 increased dizziness.     Orthopedic Surgery Op Note 3/31/2020                                               Anesthesia Information 3/31/2020    Physical Therapy Evaluation Note  File Time:4/1/2020 10:00 AM                              Occupational Therapy Evaluation Notes File Time: 4/1/2020 12:14 PM     Provider, please specify diagnosis or diagnoses associated with above clinical findings.    [X] Acute blood loss anemia expected post-operatively   [  ] Acute blood loss anemia     [  ] Other Hematological Diagnosis (please specify):     [  ] Clinically Undetermined       Please document in your progress notes daily for the duration of treatment, until resolved, and include in your discharge summary.

## 2020-04-03 NOTE — TELEPHONE ENCOUNTER
Reached out for [facility]V.  He would like the virtual appointment.   Pt has email and  access to portal.  He said he has a smart phone with a different number that I added to his account.  I sent him a copy of the instructions for downloading MyChart.  He says that he was having anxiety issues prior to his surgery and the ED doctor ordered Klonopin which helped a lot.  He would like to know if Elana wound order this for him in the meantime.  I told him I would pass the message on to the clinical staff and they will contact him.

## 2020-04-06 ENCOUNTER — TELEPHONE (OUTPATIENT)
Dept: FAMILY MEDICINE | Facility: CLINIC | Age: 50
End: 2020-04-06

## 2020-04-06 ENCOUNTER — PATIENT OUTREACH (OUTPATIENT)
Dept: FAMILY MEDICINE | Facility: CLINIC | Age: 50
End: 2020-04-06

## 2020-04-06 ENCOUNTER — TELEPHONE (OUTPATIENT)
Dept: ORTHOPEDICS | Facility: CLINIC | Age: 50
End: 2020-04-06

## 2020-04-06 NOTE — TELEPHONE ENCOUNTER
"Received call from bria regarding this patient. The representative stated that the patient has wounds and was not sent home with anything to clean them or change his wounds. I did state that I would be more than happy to order home health for him, but as far as I know, home health is not a covered service under medicaid plans. Is that so? The representative stated, "Hmm i'm not sure." also there were questions about DME. I stated that there is a note in the system that a 3in1 commode and rolling walker was delivered to the patients room prior to discharge. Patient did call requesting a shower chair which I did submit for. However, I received correspondence back from [facility] that it is not a covered item and patient will go elsewhere. (scanned in ) the representative asked for the hospitals phone to speak with someone in case management. I provided the phone number.   "

## 2020-04-06 NOTE — PROGRESS NOTES
"Discharge Information     Discharge Date:       04/02/2020    Primary Discharge Diagnosis: Spondylolisthesis of Lumbar Region          How patient is feeling since discharge from the hospital?  Patient states he's okay and dealing with the pain.         Medication & Order Review     Discharge Medication Review: Completed 4/06/2020    Medication reconciliation performed Yes   If no, state reason why not performed: N/A     Did patient have any difficulty/problems filling prescriptions?  NO           If yes, state reason and steps taken to assist in resolving issues: N/A     Does patient have any questions regarding medications?  Yes, Patient was asking to have Klonopin refilled and it was explained to him that he has to call Orthopedics for a refill.           If yes, state question and steps taken to answer question: N/A    Home Health Yes   If yes, has home health contacted patient and/or initiated services? Yes, but it was canceled due to insurance    Name of Home Health Agency Unknown    Durable Medical Equipment (DME) NO   If yes, has the DME provider contacted patient and delivered equipment? N/A   DME Company N/A     Red Flag Review     Was patient educated on "red flags" or things to watch for?  No      If yes:  "Red flags" patient was told to watch for:     N/A                                                    Is patient experiencing any red flags today (or any of these symptoms) (List examples of red flags specifically)?  N/A     Notes:  Patient states that he wasn't educated on anything after his surgery.                   Patient Education & Follow Up               Phone number patient will call if having any questions or problems: [phone]    Appointment scheduled?  No ; waiting on patient to set up [facility] alison after being given the instructions to do so step by step.    Follow-up/transition of care appointment, including the date/time and location of your appointment: 04/17/2020  9:45am "     Notes:  Patient is unaware of anything at this point. Patient also stated that he has no one to care for him and no supplies to take of him self post surgery.         Rescheduled transition of care appointment if the patient has a conflict:    Appointment re-scheduled?  No        Patient informed of this appointment?  Yes      Notes:  Patient was given f/u date and time with Orthopedics

## 2020-04-06 NOTE — TELEPHONE ENCOUNTER
Spoke to patient and completed 2 day post hospital questions. Patient was sent the link via text to set up the [employer] alison in order to set up his f/u appointment. Awaiting patient to complete this process.

## 2020-04-07 ENCOUNTER — TELEPHONE (OUTPATIENT)
Dept: ORTHOPEDICS | Facility: CLINIC | Age: 50
End: 2020-04-07

## 2020-04-07 NOTE — TELEPHONE ENCOUNTER
Patient called with questions about his dressings on incisions as well as Klonopin medication. Spoke with patient and informed him that Dr Mena does not prescribe Klonopin but that I would discuss it when he returned to office. As far as the dressings, Mr Coulter states that he spoke with his insurance and they said if we sent an order to a facility for dressings that it would be covered; he said that he would sent a portal message with the fax number as he did not have it on hand while we were speaking. I offered for him to come to office tomorrow so that we could look at his incisions and redress them, but he said he didn't want to leave the house. I told him to call back if he had any more questions and that when he sent the fax number for the dressings that we would send the order for him.

## 2020-04-08 NOTE — TELEPHONE ENCOUNTER
Patient was unable to successfully download and log into the alison for a video visit and agreed to come into the office on 04/21/2020 at 11 am.

## 2020-04-13 ENCOUNTER — TELEPHONE (OUTPATIENT)
Dept: ORTHOPEDICS | Facility: CLINIC | Age: 50
End: 2020-04-13

## 2020-04-13 DIAGNOSIS — Z98.1 S/P LUMBAR FUSION: ICD-10-CM

## 2020-04-13 NOTE — TELEPHONE ENCOUNTER
----- Message from Radha Rendon sent at 4/13/2020 12:34 PM CDT -----  Contact: patient  Patient needs a refill on Hydrocodone 10 mg, call patient back at [phone].

## 2020-04-15 DIAGNOSIS — Z98.1 S/P LUMBAR FUSION: ICD-10-CM

## 2020-04-15 RX ORDER — HYDROCODONE BITARTRATE AND ACETAMINOPHEN 10; 325 MG/1; MG/1
1 TABLET ORAL EVERY 4 HOURS PRN
Qty: 42 TABLET | Refills: 0 | Status: SHIPPED | OUTPATIENT
Start: 2020-04-15 | End: 2020-04-22

## 2020-04-15 NOTE — TELEPHONE ENCOUNTER
----- Message from Marisol Alva sent at 4/15/2020 12:25 PM CDT -----  Contact: Patient  [facility] Please call [phone] or [phone]

## 2020-04-17 ENCOUNTER — OFFICE VISIT (OUTPATIENT)
Dept: ORTHOPEDICS | Facility: CLINIC | Age: 50
End: 2020-04-17
Payer: MEDICAID

## 2020-04-17 VITALS — BODY MASS INDEX: 20.08 KG/M2 | WEIGHT: 165 LBS | DIASTOLIC BLOOD PRESSURE: 80 MMHG | SYSTOLIC BLOOD PRESSURE: 138 MMHG

## 2020-04-17 DIAGNOSIS — Z98.1 S/P LUMBAR FUSION: Primary | ICD-10-CM

## 2020-04-17 PROCEDURE — 99024 POSTOP FOLLOW-UP VISIT: CPT | Mod: S$GLB,,, | Performed by: PHYSICIAN ASSISTANT

## 2020-04-17 PROCEDURE — 99024 PR POST-OP FOLLOW-UP VISIT: ICD-10-PCS | Mod: S$GLB,,, | Performed by: PHYSICIAN ASSISTANT

## 2020-04-17 NOTE — PROGRESS NOTES
Subjective:    Patient ID: Josh Coulter is a 49 y.o. male.    Chief Complaint: Post-op Evaluation of the Lumbar Spine (/STAPLES// L4 Laminectomy, L4-5 APLIF, L5-S1 Removal 3-. Lumbar is a lot better. He is doing great and walking very well.)      History of Present Illness  Patient is here for his 2 week postoperative appointment status post L4-5 anterior lumbar interbody fusion with posterior revision and fusion.  Overall he is doing fantastically well with minimal pain.    Current Medications  Current Outpatient Medications   Medication Sig Dispense Refill    clonazePAM (KLONOPIN) 0.5 MG tablet Take 1 tablet (0.5 mg total) by mouth 3 (three) times daily as needed for Anxiety. 15 tablet 0    cyclobenzaprine (FLEXERIL) 5 MG tablet Take 2 tablets (10 mg total) by mouth 3 (three) times daily as needed for Muscle spasms. 30 tablet 0    HYDROcodone-acetaminophen (NORCO)  mg per tablet Take 1 tablet by mouth every 4 (four) hours as needed for Pain. 42 tablet 0    sildenafil (REVATIO) 20 mg Tab Take 1 tablet (20 mg total) by mouth daily as needed (erectile dysfunction). 30 tablet 1     No current facility-administered medications for this visit.        Allergies  Review of patient's allergies indicates:   Allergen Reactions    Contrast media Other (See Comments)     syncopy       Past Medical History  Past Medical History:   Diagnosis Date    Hyponatremia     Syncope        Surgical History  Past Surgical History:   Procedure Laterality Date    ANTERIOR LUMBAR INTERBODY FUSION (ALIF) N/A 3/31/2020    Procedure: FUSION, SPINE, LUMBAR, ALIF L4-5;  Surgeon: Cholo Mena MD;  Location: [facility] OR;  Service: Orthopedics;  Laterality: N/A;    BONE GRAFT N/A 3/31/2020    Procedure: BONE GRAFT;  Surgeon: Cholo Mena MD;  Location: [facility] OR;  Service: Orthopedics;  Laterality: N/A;    FUSION OF SPINE WITH INSTRUMENTATION N/A 3/31/2020    Procedure: FUSION, SPINE, WITH INSTRUMENTATION L4-5;  Surgeon:  Cholo Mena MD;  Location: [facility];  Service: Orthopedics;  Laterality: N/A;  [facility], [employer], DR DAMON, CO-SURGEON    HERNIA REPAIR      SPINE SURGERY  2009       Family History:   Family History   Problem Relation Age of Onset    Hyperlipidemia Mother     Hypertension Mother     Hyperlipidemia Father     Hypertension Father        Social History:   Social History     Socioeconomic History    Marital status: Legally      Spouse name: Not on file    Number of children: 3    Years of education: Not on file    Highest education level: Not on file   Occupational History    Occupation: unemployed   Social Needs    Financial resource strain: Not on file    Food insecurity:     Worry: Not on file     Inability: Not on file    Transportation needs:     Medical: Not on file     Non-medical: Not on file   Tobacco Use    Smoking status: Current Every Day Smoker     Packs/day: 1.00     Years: 19.00     Pack years: 19.00     Types: Cigarettes    Smokeless tobacco: Never Used   Substance and Sexual Activity    Alcohol use: Yes     Alcohol/week: 14.0 standard drinks     Types: 14 Cans of beer per week     Comment: 2 beers per night    Drug use: Yes     Types: Marijuana    Sexual activity: Yes     Partners: Female   Lifestyle    Physical activity:     Days per week: Not on file     Minutes per session: Not on file    Stress: Very much   Relationships    Social connections:     Talks on phone: Not on file     Gets together: Not on file     Attends Shinto service: Not on file     Active member of club or organization: Not on file     Attends meetings of clubs or organizations: Not on file     Relationship status: Not on file   Other Topics Concern    Not on file   Social History Narrative    Not on file       Date of surgery:  03/31/2020    Review of Systems     General/Constitutional: Chills denies. Fatigue denies. Fever denies. Weight gain denies. Weight loss denies.    Musculoskeletal:  Comments: See HPI for details    Skin: Rash denies.    Objective:   Vital Signs:   Vitals:    04/17/20 0954   BP: 138/80        Physical Exam    This a well-developed, well nourished patient in no acute distress.  They are alert and oriented and cooperative to examination.  Pt. walks without an antalgic gait.      General Examination:     Constitutional: The patient is alert and oriented to lace person and time. Mood is pleasant.     Head/Face: Normal facial features normal eyebrows    Eyes: Normal extraocular motion bilaterally    Lungs: Respirations are equal and unlabored    Gait is coordinated.    Cardiovascular: There are no swelling or varicosities present.    Lymphatic: Negative for adenopathy    Skin: Normal    Neurological: Level of consciousness normal. Oriented to place person and time and situation    Psychiatric: Oriented to time place person and situation    Patient presents in a wheelchair but can ambulate with a cane.  Consider and up straight without any pain.  Both of his incisions look great and staples removed today.  There are no signs or symptoms of infection.  Lumbar range of motion is still moderately decreased but much less painful.  Bilateral lower extremities are distal neurovascular intact.  XRAY Report/ Interpretation:  Postop lumbar AP and lateral x-rays taken in the office today reviewed the patient demonstrate a normal postoperative appearance with interbody cage at L4-5 and posterior instrumentation at the same level.  The L5-S1 instrumentation has been removed      Assessment:       1. S/P lumbar fusion        Plan:       Josh was seen today for post-op evaluation.    Diagnoses and all orders for this visit:    S/P lumbar fusion  -     X-Ray Lumbar Spine Ap And Lateral         No follow-ups on file.    Continue to increase activity as tolerated.  I stressed to him that I want him doing as much walking as possible.  Continue with medications as prescribed.  Follow up in 6 weeks with  updated postoperative x-rays      This note was created using Dragon voice recognition software that occasionally misinterpreted phrases or words.

## 2020-04-20 ENCOUNTER — TELEPHONE (OUTPATIENT)
Dept: FAMILY MEDICINE | Facility: CLINIC | Age: 50
End: 2020-04-20

## 2020-04-21 ENCOUNTER — OFFICE VISIT (OUTPATIENT)
Dept: FAMILY MEDICINE | Facility: CLINIC | Age: 50
End: 2020-04-21
Payer: MEDICAID

## 2020-04-21 VITALS
HEART RATE: 82 BPM | OXYGEN SATURATION: 99 % | DIASTOLIC BLOOD PRESSURE: 78 MMHG | SYSTOLIC BLOOD PRESSURE: 128 MMHG | RESPIRATION RATE: 20 BRPM | HEIGHT: 76 IN | BODY MASS INDEX: 19.91 KG/M2 | TEMPERATURE: 98 F | WEIGHT: 163.5 LBS

## 2020-04-21 DIAGNOSIS — D63.8 ANEMIA IN OTHER CHRONIC DISEASES CLASSIFIED ELSEWHERE: ICD-10-CM

## 2020-04-21 DIAGNOSIS — Z09 HOSPITAL DISCHARGE FOLLOW-UP: Primary | ICD-10-CM

## 2020-04-21 DIAGNOSIS — F10.11 HISTORY OF ALCOHOL ABUSE: ICD-10-CM

## 2020-04-21 DIAGNOSIS — E87.1 HYPONATREMIA: ICD-10-CM

## 2020-04-21 PROBLEM — D64.9 ABSOLUTE ANEMIA: Status: ACTIVE | Noted: 2020-04-21

## 2020-04-21 PROCEDURE — 99214 OFFICE O/P EST MOD 30 MIN: CPT | Performed by: NURSE PRACTITIONER

## 2020-04-21 PROCEDURE — 99214 OFFICE O/P EST MOD 30 MIN: CPT | Mod: S$PBB,,, | Performed by: NURSE PRACTITIONER

## 2020-04-21 PROCEDURE — 99214 PR OFFICE/OUTPT VISIT, EST, LEVL IV, 30-39 MIN: ICD-10-PCS | Mod: S$PBB,,, | Performed by: NURSE PRACTITIONER

## 2020-04-21 NOTE — PROGRESS NOTES
"    SUBJECTIVE:      Patient ID: Josh Coulter is a 49 y.o. male.    Chief Complaint: Follow-up (hospital )    Pt presents for a hospital F/U walking upright with a cane. He had an L4 Laminectomy, L4-5 APLIF, L5-S1 Removal on 3- through Dr. Mena. He saw the PA in Dr. Mena's office for his ortho F/U on 4/17/2020 and was told to do his exercises and walk at home on a daily basis which he reports he is doing. Reports his pain is well-controlled and he "feels great." He has been following the plan of care through orthopedics and needs no refills on prescriptions at this time. We reviewed his lab results from last visit together. He hadn't followed up after the labs as previously directed. His labs showed hyponatremia, chronic anemia, elevated LFTs, and an elevated HDL which are consistent with alcoholism. After review of his old records previous to this visit, the chronicity of these issues was exposed. We spoke frankly about this finding today during clinic. He admitted he had lied in the past about his drinking to me- reporting he previously stated he drinks 2 beers per day but these beers were 42oz beers. He reports he is no longer drinking following the surgery and stopped while he was hospitalized. Reports he had a disagreement with a friend last night who came over to drink and was upset he was no longer drinking. Reports his wife and children are visiting with him now because he "is more pleasant." Denies CP, SOB, wheezing, fevers, nausea, vomiting, diarrhea, constipation, numbness, weakness, dizziness, palpitations, or any other concerns at this time.      Past Surgical History:   Procedure Laterality Date    ANTERIOR LUMBAR INTERBODY FUSION (ALIF) N/A 3/31/2020    Procedure: FUSION, SPINE, LUMBAR, ALIF L4-5;  Surgeon: Cholo Mena MD;  Location: [facility] OR;  Service: Orthopedics;  Laterality: N/A;    BONE GRAFT N/A 3/31/2020    Procedure: BONE GRAFT;  Surgeon: Cholo Mena MD;  Location: [facility] OR;  Service: Orthopedics;  Laterality: N/A;    FUSION OF SPINE WITH INSTRUMENTATION N/A 3/31/2020    Procedure: FUSION, SPINE, WITH INSTRUMENTATION L4-5;  Surgeon: Cholo Mena MD;  Location: [facility] OR;  Service: Orthopedics;  Laterality: N/A;  [facility], [employer], DR DAMON, CO-SURGEON    HERNIA REPAIR      SPINE SURGERY  2009     Family History   Problem Relation Age of Onset    Hyperlipidemia Mother     Hypertension Mother     Hyperlipidemia Father     Hypertension Father       Social History     Socioeconomic History    Marital status: Legally      Spouse name: Not on file    Number of children: 3    Years of education: Not on file    Highest education level: Not on file   Occupational History    Occupation: unemployed   Social Needs    Financial resource strain: Not on file    Food insecurity:     Worry: Not on file     Inability: Not on file    Transportation needs:     Medical: Not on file     Non-medical: Not on file   Tobacco Use    Smoking status: Current Every Day Smoker     Packs/day: 1.00     Years: 19.00     Pack years: 19.00     Types: Cigarettes    Smokeless tobacco: Never Used   Substance and Sexual Activity    Alcohol use: Yes     Alcohol/week: 14.0 standard drinks     Types: 14 Cans of beer per week     Comment: 2 beers per night    Drug use: Yes     Types: Marijuana    Sexual activity: Yes     Partners: Female   Lifestyle    Physical activity:     Days per week: Not on file     Minutes per session: Not on file    Stress: Very much   Relationships    Social connections:     Talks on phone: Not on file     Gets together: Not on file     Attends U[employer] service: Not on file     Active member of club or organization: Not on file     Attends meetings of clubs or organizations: Not on file     Relationship status: Not on file   Other Topics Concern    Not on file   Social History Narrative    Not on file     Current Outpatient Medications   Medication Sig Dispense  Refill    clonazePAM (KLONOPIN) 0.5 MG tablet Take 1 tablet (0.5 mg total) by mouth 3 (three) times daily as needed for Anxiety. 15 tablet 0    cyclobenzaprine (FLEXERIL) 5 MG tablet Take 2 tablets (10 mg total) by mouth 3 (three) times daily as needed for Muscle spasms. 30 tablet 0    HYDROcodone-acetaminophen (NORCO)  mg per tablet Take 1 tablet by mouth every 4 (four) hours as needed for Pain. 42 tablet 0    sildenafil (REVATIO) 20 mg Tab Take 1 tablet (20 mg total) by mouth daily as needed (erectile dysfunction). 30 tablet 1     No current facility-administered medications for this visit.      Review of patient's allergies indicates:   Allergen Reactions    Contrast media Other (See Comments)     syncopy      Past Medical History:   Diagnosis Date    Hyponatremia     Syncope      Past Surgical History:   Procedure Laterality Date    ANTERIOR LUMBAR INTERBODY FUSION (ALIF) N/A 3/31/2020    Procedure: FUSION, SPINE, LUMBAR, ALIF L4-5;  Surgeon: Cholo Mena MD;  Location: [facility] OR;  Service: Orthopedics;  Laterality: N/A;    BONE GRAFT N/A 3/31/2020    Procedure: BONE GRAFT;  Surgeon: Cholo Mena MD;  Location: [facility] OR;  Service: Orthopedics;  Laterality: N/A;    FUSION OF SPINE WITH INSTRUMENTATION N/A 3/31/2020    Procedure: FUSION, SPINE, WITH INSTRUMENTATION L4-5;  Surgeon: Cholo Mena MD;  Location: [facility] OR;  Service: Orthopedics;  Laterality: N/A;  [facility], [employer], DR DAMON, CO-SURGEON    HERNIA REPAIR      SPINE SURGERY  2009       Review of Systems   Constitutional: Negative for activity change, appetite change, chills, fatigue, fever and unexpected weight change.   HENT: Negative for congestion, ear pain, hearing loss, mouth sores, nosebleeds, postnasal drip, rhinorrhea, sinus pressure, sinus pain, sneezing, sore throat and trouble swallowing.    Eyes: Negative for pain, discharge and visual disturbance.   Respiratory: Negative for apnea, cough, chest tightness, shortness  "of breath, wheezing and stridor.    Cardiovascular: Negative for chest pain, palpitations and leg swelling.   Gastrointestinal: Negative for abdominal pain, blood in stool, constipation, diarrhea, nausea and vomiting.   Endocrine: Negative for polydipsia and polyuria.   Genitourinary: Negative for difficulty urinating, dysuria, flank pain, frequency, hematuria and urgency.   Musculoskeletal: Negative for arthralgias, back pain, joint swelling, myalgias, neck pain and neck stiffness.   Skin: Negative for color change, rash and wound.   Neurological: Negative for dizziness, tremors, seizures, syncope, weakness, light-headedness, numbness and headaches.   Hematological: Negative for adenopathy.   Psychiatric/Behavioral: Negative for confusion, dysphoric mood, sleep disturbance and suicidal ideas.      OBJECTIVE:      Vitals:    04/21/20 1058   BP: 128/78   BP Location: Left arm   Patient Position: Sitting   BP Method: Large (Manual)   Pulse: 82   Resp: 20   Temp: 98.2 °F (36.8 °C)   TempSrc: Oral   SpO2: 99%   Weight: 74.2 kg (163 lb 8 oz)   Height: 6' 4" (1.93 m)     Physical Exam   Constitutional: He is oriented to person, place, and time. Vital signs are normal. He appears well-developed and well-nourished. No distress.   HENT:   Head: Normocephalic and atraumatic.   Right Ear: Hearing, tympanic membrane, external ear and ear canal normal.   Left Ear: Hearing, tympanic membrane, external ear and ear canal normal.   Nose: Nose normal. No mucosal edema or rhinorrhea.   Mouth/Throat: Uvula is midline, oropharynx is clear and moist and mucous membranes are normal. No oropharyngeal exudate, posterior oropharyngeal edema or posterior oropharyngeal erythema.   Eyes: Pupils are equal, round, and reactive to light. Conjunctivae, EOM and lids are normal. Right eye exhibits no discharge. Left eye exhibits no discharge. No scleral icterus.   Neck: Trachea normal, normal range of motion, full passive range of motion without pain " and phonation normal. Neck supple. Carotid bruit is not present. No tracheal deviation present. No thyromegaly present.   Cardiovascular: Normal rate, regular rhythm, normal heart sounds, intact distal pulses and normal pulses. Exam reveals no gallop and no friction rub.   No murmur heard.  Pulmonary/Chest: Effort normal and breath sounds normal. No stridor. No respiratory distress. He has no decreased breath sounds. He has no wheezes. He has no rhonchi. He has no rales.   Abdominal: Soft. Normal appearance and bowel sounds are normal. There is no tenderness.   Musculoskeletal: Normal range of motion. He exhibits no edema.   Lymphadenopathy:     He has no cervical adenopathy.        Right: No supraclavicular adenopathy present.        Left: No supraclavicular adenopathy present.   Neurological: He is alert and oriented to person, place, and time.   Skin: Skin is warm, dry and intact. Capillary refill takes less than 2 seconds. No rash noted. He is not diaphoretic.        Healing incisions present to midline lumbar spine and L torso- no warmth, redness, drainage, or induration   Psychiatric: He has a normal mood and affect. His speech is normal and behavior is normal. Judgment and thought content normal. Cognition and memory are normal. He expresses no suicidal plans.   Vitals reviewed.     Assessment:       1. Hospital discharge follow-up    2. Hyponatremia    3. Anemia in other chronic diseases classified elsewhere    4. History of alcohol abuse        Plan:       Hospital discharge follow-up  Pt's hospital course was reviewed. Recommendations for follow up visits were discussed and reconciled. No referrals necessary at this time. Discharge and current medications have been reviewed and reconciled with the chart.  -     CBC auto differential; Future; Expected date: 04/21/2020  -     Comprehensive metabolic panel; Future; Expected date: 04/21/2020    Hyponatremia  Rechecking CMP for chronic hyponatremia and elevated  LFTs related to pt's uncovered alcoholism. Expect to see an increase in the sodium level as he continues sobriety. Will call with results.  -     Comprehensive metabolic panel; Future; Expected date: 04/21/2020    Anemia in other chronic diseases classified elsewhere  Rechecking CBC post discharge with expected postoperative drop in H/H. Expect to see an increase as he recovers from the surgery and also continues in his sobriety. Consider additional labs if anemia continues. Will call with results.  -     CBC auto differential; Future; Expected date: 04/21/2020    History of alcohol abuse  -     Comprehensive metabolic panel; Future; Expected date: 04/21/2020        Follow up in 3 months (on 7/8/2020) for F/U as scheduled.      4/21/2020 Elana Lopez, EVANS, FNP

## 2020-04-21 NOTE — PATIENT INSTRUCTIONS

## 2020-04-22 DIAGNOSIS — Z98.1 S/P LUMBAR FUSION: ICD-10-CM

## 2020-04-22 RX ORDER — HYDROCODONE BITARTRATE AND ACETAMINOPHEN 10; 325 MG/1; MG/1
1 TABLET ORAL EVERY 6 HOURS PRN
Qty: 28 TABLET | Refills: 0 | Status: SHIPPED | OUTPATIENT
Start: 2020-04-22 | End: 2020-04-29 | Stop reason: SDUPTHER

## 2020-04-22 NOTE — TELEPHONE ENCOUNTER
----- Message from Radha Rendon sent at 4/21/2020  2:50 PM CDT -----  Contact: patient  Patient is calling to get a refill on Norco 10 mg for tomorrow, call patient back at [phone].

## 2020-04-22 NOTE — TELEPHONE ENCOUNTER
Spoke with the patient. Notified we will send rx to pharmacy and also decrease medication from every 4 hours to every 6 hours. He understood.

## 2020-04-24 ENCOUNTER — TELEPHONE (OUTPATIENT)
Dept: ORTHOPEDICS | Facility: CLINIC | Age: 50
End: 2020-04-24

## 2020-04-24 NOTE — TELEPHONE ENCOUNTER
Patient is Post-op 3/31/2020  L4-5 anterior lumbar interbody fusion with posterior revision and fusion.     Patient is currently taking hydrocodone acetaminophen 10/325.  He reports this was decreased from 6 times a day to 4 times a day.  However, he currently finds that this is in adequate in controlling his pain.    The patient at this time does not request a change in his pain medication nor does he any specific concerns at this time.  He only cause the office to the notify us that he occasionally is taking more of his pain medication than a is as prescribed to help control his pain.  This may include taking his medication every 4 hr.    He estimates that he will be out of his current prescription of pain medication on Monday.    He is currently not taking his benzodiazepine as this is contraindicated while taking narcotic pain medication and he is taking his muscle relaxer as prescribed.    I discussed with the patient my concerns for his history of alcoholism, his chronically elevated liver functions tests which include elevated transaminases.  I do not see documented phone minute hepatic failure however I do have strong concerns about him taking excess acetaminophen.  We discussed this at length and patient verbalizes understanding.    At this time he does not wish to changed to oxycodone due to perceived adverse effects or outcomes associated with opioid abuse.  I believe this is appropriate and acceptable and he will continue with his current therapy.

## 2020-04-29 DIAGNOSIS — Z98.1 S/P LUMBAR FUSION: ICD-10-CM

## 2020-04-29 RX ORDER — HYDROCODONE BITARTRATE AND ACETAMINOPHEN 10; 325 MG/1; MG/1
1 TABLET ORAL EVERY 6 HOURS PRN
Qty: 28 TABLET | Refills: 0 | Status: SHIPPED | OUTPATIENT
Start: 2020-04-29 | End: 2020-05-05 | Stop reason: SDUPTHER

## 2020-04-29 NOTE — TELEPHONE ENCOUNTER
----- Message from Vivian Crystal sent at 4/28/2020  2:18 PM CDT -----  Contact: Pt  Pt stated that he has run out of pain medication and that he left message 4/27/20.  Pt call back # [phone]

## 2020-05-05 DIAGNOSIS — Z98.1 S/P LUMBAR FUSION: ICD-10-CM

## 2020-05-05 RX ORDER — HYDROCODONE BITARTRATE AND ACETAMINOPHEN 10; 325 MG/1; MG/1
1 TABLET ORAL EVERY 6 HOURS PRN
Qty: 28 TABLET | Refills: 0 | Status: SHIPPED | OUTPATIENT
Start: 2020-05-05 | End: 2020-05-12

## 2020-05-05 NOTE — TELEPHONE ENCOUNTER
----- Message from Vivian Crystal sent at 5/5/2020 10:01 AM CDT -----  Contact: Pt  Pt stated he needs a Rx refill of Hydrocodone 10mg  Pt call back # [phone]

## 2020-05-12 DIAGNOSIS — Z98.1 S/P LUMBAR FUSION: Primary | ICD-10-CM

## 2020-05-12 DIAGNOSIS — M43.16 SPONDYLOLISTHESIS OF LUMBAR REGION: ICD-10-CM

## 2020-05-12 NOTE — TELEPHONE ENCOUNTER
Spoke with patient, will decrease dosage of pain medication and refill Flexeril. Will send to pharmacy once physician signs

## 2020-05-12 NOTE — TELEPHONE ENCOUNTER
----- Message from Zaria Gonsalez sent at 5/12/2020 12:44 PM CDT -----  Needs refill on Norco 10mg and Cyclobenzaprine 10mg

## 2020-05-13 RX ORDER — HYDROCODONE BITARTRATE AND ACETAMINOPHEN 7.5; 325 MG/1; MG/1
1 TABLET ORAL EVERY 6 HOURS PRN
Qty: 28 TABLET | Refills: 0 | Status: SHIPPED | OUTPATIENT
Start: 2020-05-13 | End: 2020-05-20 | Stop reason: SDUPTHER

## 2020-05-13 RX ORDER — CYCLOBENZAPRINE HCL 5 MG
10 TABLET ORAL 3 TIMES DAILY PRN
Qty: 30 TABLET | Refills: 0 | Status: SHIPPED | OUTPATIENT
Start: 2020-05-13 | End: 2020-07-01 | Stop reason: SDUPTHER

## 2020-05-20 DIAGNOSIS — Z98.1 S/P LUMBAR FUSION: ICD-10-CM

## 2020-05-20 RX ORDER — HYDROCODONE BITARTRATE AND ACETAMINOPHEN 7.5; 325 MG/1; MG/1
1 TABLET ORAL EVERY 6 HOURS PRN
Qty: 28 TABLET | Refills: 0 | Status: SHIPPED | OUTPATIENT
Start: 2020-05-20 | End: 2020-05-27 | Stop reason: SDUPTHER

## 2020-05-20 NOTE — TELEPHONE ENCOUNTER
----- Message from Vivian Crystal sent at 5/19/2020  2:02 PM CDT -----  Contact: Pt   Pt states he needs a Rx refill of Hydrocodone 7.5 Pt call back #[phone]

## 2020-05-27 DIAGNOSIS — Z98.1 S/P LUMBAR FUSION: ICD-10-CM

## 2020-05-27 RX ORDER — HYDROCODONE BITARTRATE AND ACETAMINOPHEN 7.5; 325 MG/1; MG/1
1 TABLET ORAL EVERY 6 HOURS PRN
Qty: 28 TABLET | Refills: 0 | Status: SHIPPED | OUTPATIENT
Start: 2020-05-27 | End: 2020-06-03 | Stop reason: SDUPTHER

## 2020-05-27 NOTE — TELEPHONE ENCOUNTER
----- Message from Radha Rendon sent at 5/27/2020  4:55 PM CDT -----  Contact: patient  Patient needs a refill on Hydrocodone 7.5mg, can we send it to [facility].

## 2020-05-29 ENCOUNTER — OFFICE VISIT (OUTPATIENT)
Dept: ORTHOPEDICS | Facility: CLINIC | Age: 50
End: 2020-05-29
Payer: MEDICAID

## 2020-05-29 VITALS — WEIGHT: 170 LBS | SYSTOLIC BLOOD PRESSURE: 128 MMHG | DIASTOLIC BLOOD PRESSURE: 74 MMHG | BODY MASS INDEX: 20.69 KG/M2

## 2020-05-29 DIAGNOSIS — Z98.1 S/P LUMBAR SPINAL FUSION: ICD-10-CM

## 2020-05-29 PROCEDURE — 99024 POSTOP FOLLOW-UP VISIT: CPT | Mod: S$GLB,,, | Performed by: PHYSICIAN ASSISTANT

## 2020-05-29 PROCEDURE — 99024 PR POST-OP FOLLOW-UP VISIT: ICD-10-PCS | Mod: S$GLB,,, | Performed by: PHYSICIAN ASSISTANT

## 2020-05-29 NOTE — PROGRESS NOTES
Subjective:    Patient ID: Josh Coulter is a 49 y.o. male.    Chief Complaint: Post-op Evaluation of the Lumbar Spine (Follow up lumbar fusion 3-. Lumbar is a little better but his right leg is giving out and so far he has been able to catch himself.No other treatment)      History of Present Illness  Patient is here about 2 months status post L4-5 anterior lumbar interbody fusion with posterior revision and fusion.  Overall continues to do well but has had a little bit of increased back pain and some right lower extremity radicular symptoms over the last month or so.    Current Medications  Current Outpatient Medications   Medication Sig Dispense Refill    clonazePAM (KLONOPIN) 0.5 MG tablet Take 1 tablet (0.5 mg total) by mouth 3 (three) times daily as needed for Anxiety. 15 tablet 0    cyclobenzaprine (FLEXERIL) 5 MG tablet Take 2 tablets (10 mg total) by mouth 3 (three) times daily as needed for Muscle spasms. 30 tablet 0    HYDROcodone-acetaminophen (NORCO) 7.5-325 mg per tablet Take 1 tablet by mouth every 6 (six) hours as needed for Pain. 28 tablet 0    sildenafil (REVATIO) 20 mg Tab Take 1 tablet (20 mg total) by mouth daily as needed (erectile dysfunction). 30 tablet 1     No current facility-administered medications for this visit.        Allergies  Review of patient's allergies indicates:   Allergen Reactions    Contrast media Other (See Comments)     syncopy       Past Medical History  Past Medical History:   Diagnosis Date    Hyponatremia     Syncope        Surgical History  Past Surgical History:   Procedure Laterality Date    ANTERIOR LUMBAR INTERBODY FUSION (ALIF) N/A 3/31/2020    Procedure: FUSION, SPINE, LUMBAR, ALIF L4-5;  Surgeon: Cholo Mena MD;  Location: [facility] OR;  Service: Orthopedics;  Laterality: N/A;    BONE GRAFT N/A 3/31/2020    Procedure: BONE GRAFT;  Surgeon: Cholo Mena MD;  Location: [facility] OR;  Service: Orthopedics;  Laterality: N/A;    FUSION OF SPINE  WITH INSTRUMENTATION N/A 3/31/2020    Procedure: FUSION, SPINE, WITH INSTRUMENTATION L4-5;  Surgeon: Cholo Mena MD;  Location: [facility];  Service: Orthopedics;  Laterality: N/A;  [facility], [employer], DR DAMON, CO-SURGEON    HERNIA REPAIR      SPINE SURGERY  2009       Family History:   Family History   Problem Relation Age of Onset    Hyperlipidemia Mother     Hypertension Mother     Hyperlipidemia Father     Hypertension Father        Social History:   Social History     Socioeconomic History    Marital status: Legally      Spouse name: Not on file    Number of children: 3    Years of education: Not on file    Highest education level: Not on file   Occupational History    Occupation: unemployed   Social Needs    Financial resource strain: Not on file    Food insecurity:     Worry: Not on file     Inability: Not on file    Transportation needs:     Medical: Not on file     Non-medical: Not on file   Tobacco Use    Smoking status: Current Every Day Smoker     Packs/day: 1.00     Years: 19.00     Pack years: 19.00     Types: Cigarettes    Smokeless tobacco: Never Used   Substance and Sexual Activity    Alcohol use: Yes     Alcohol/week: 14.0 standard drinks     Types: 14 Cans of beer per week     Comment: 2 beers per night    Drug use: Yes     Types: Marijuana    Sexual activity: Yes     Partners: Female   Lifestyle    Physical activity:     Days per week: Not on file     Minutes per session: Not on file    Stress: Very much   Relationships    Social connections:     Talks on phone: Not on file     Gets together: Not on file     Attends [employer] service: Not on file     Active member of club or organization: Not on file     Attends meetings of clubs or organizations: Not on file     Relationship status: Not on file   Other Topics Concern    Not on file   Social History Narrative    Not on file       Date of surgery:  03/31/2020    Review of Systems     General/Constitutional:  Chills denies. Fatigue denies. Fever denies. Weight gain denies. Weight loss denies.    Musculoskeletal: Comments: See HPI for details    Skin: Rash denies.    Objective:   Vital Signs:   Vitals:    05/29/20 0952   BP: 128/74        Physical Exam    This a well-developed, well nourished patient in no acute distress.  They are alert and oriented and cooperative to examination.  Pt. walks without an antalgic gait.      General Examination:     Constitutional: The patient is alert and oriented to lace person and time. Mood is pleasant.     Head/Face: Normal facial features normal eyebrows    Eyes: Normal extraocular motion bilaterally    Lungs: Respirations are equal and unlabored    Gait is coordinated.    Cardiovascular: There are no swelling or varicosities present.    Lymphatic: Negative for adenopathy    Skin: Normal    Neurological: Level of consciousness normal. Oriented to place person and time and situation    Psychiatric: Oriented to time place person and situation    Mild antalgic gait with a cane. Both of his incisions continue to heal without any signs or symptoms of infection.  Lumbar range of motion is still moderately decreased but much less painful.  Bilateral lower extremities are distal neurovascular intact.  No focal neurological weakness    XRAY Report/ Interpretation:  Postop lumbar AP and lateral x-rays taken in the office today reviewed the patient demonstrated normal postoperative appearance      Assessment:       1. S/P lumbar spinal fusion        Plan:       Josh was seen today for post-op evaluation.    Diagnoses and all orders for this visit:    S/P lumbar spinal fusion  -     X-Ray Lumbar Spine Ap And Lateral         No follow-ups on file.    At this time recommended he continue to increase activity as tolerated and avoid activities that cause increased pain.  His mild exacerbation of symptoms may be secondary to a reaction from the bone morphogenic protein.  I reassured him that if this  is the case it is only temporary.  Follow up in 6 weeks or sooner if needed.      This note was created using Dragon voice recognition software that occasionally misinterpreted phrases or words.

## 2020-06-03 DIAGNOSIS — Z98.1 S/P LUMBAR FUSION: ICD-10-CM

## 2020-06-03 RX ORDER — HYDROCODONE BITARTRATE AND ACETAMINOPHEN 7.5; 325 MG/1; MG/1
1 TABLET ORAL EVERY 6 HOURS PRN
Qty: 28 TABLET | Refills: 0 | Status: SHIPPED | OUTPATIENT
Start: 2020-06-03 | End: 2020-06-10 | Stop reason: SDUPTHER

## 2020-06-03 NOTE — TELEPHONE ENCOUNTER
----- Message from Radha Rendon sent at 6/3/2020 10:50 AM CDT -----  Contact: patient  Patient needs a refill on Hydrocodone 7.5 mg, can we send it to [facility].

## 2020-06-10 DIAGNOSIS — Z98.1 S/P LUMBAR FUSION: ICD-10-CM

## 2020-06-10 RX ORDER — HYDROCODONE BITARTRATE AND ACETAMINOPHEN 7.5; 325 MG/1; MG/1
1 TABLET ORAL EVERY 6 HOURS PRN
Qty: 28 TABLET | Refills: 0 | Status: SHIPPED | OUTPATIENT
Start: 2020-06-10 | End: 2020-06-17 | Stop reason: SDUPTHER

## 2020-06-10 NOTE — TELEPHONE ENCOUNTER
----- Message from Hedy Porter sent at 6/10/2020 11:04 AM CDT -----  Contact: Patient [phone]  Requesting refill on his Hydrocodone 7.5/325 Dr Trina Peacock

## 2020-06-12 ENCOUNTER — OFFICE VISIT (OUTPATIENT)
Dept: ORTHOPEDICS | Facility: CLINIC | Age: 50
End: 2020-06-12
Payer: MEDICAID

## 2020-06-12 VITALS — SYSTOLIC BLOOD PRESSURE: 138 MMHG | WEIGHT: 175 LBS | DIASTOLIC BLOOD PRESSURE: 90 MMHG | BODY MASS INDEX: 21.3 KG/M2

## 2020-06-12 DIAGNOSIS — Z98.1 S/P LUMBAR SPINAL FUSION: Primary | ICD-10-CM

## 2020-06-12 PROCEDURE — 99024 PR POST-OP FOLLOW-UP VISIT: ICD-10-PCS | Mod: S$GLB,,, | Performed by: PHYSICIAN ASSISTANT

## 2020-06-12 PROCEDURE — 99024 POSTOP FOLLOW-UP VISIT: CPT | Mod: S$GLB,,, | Performed by: PHYSICIAN ASSISTANT

## 2020-06-12 RX ORDER — AMITRIPTYLINE HYDROCHLORIDE 25 MG/1
25 TABLET, FILM COATED ORAL NIGHTLY PRN
Qty: 30 TABLET | Refills: 2 | Status: SHIPPED | OUTPATIENT
Start: 2020-06-12 | End: 2021-02-22

## 2020-06-12 RX ORDER — MELOXICAM 15 MG/1
15 TABLET ORAL DAILY
Qty: 30 TABLET | Refills: 2 | Status: SHIPPED | OUTPATIENT
Start: 2020-06-12 | End: 2020-07-12

## 2020-06-12 NOTE — PROGRESS NOTES
Subjective:       Patient ID: Josh Coulter is a 49 y.o. male.    Chief Complaint: Pain of the Lumbar Spine (APLIF L4-5 03/31/2020) Lumbar is the same and radiates down right leg to the knee with swelling. No other treatment)      History of Present Illness  Patient is here over 2 months status post L4-5 anterior lumbar interbody fusion with posterior revision and fusion.  Overall continues to do well but has had a little bit of increased back pain and some right lower extremity radicular symptoms over the last month or so.  His follow-up was post be scheduled 6 weeks from any was last here but unfortunately was scheduled 2 weeks from when he was last here.  There are no significant changes.    Current Medications  Current Outpatient Medications   Medication Sig Dispense Refill    clonazePAM (KLONOPIN) 0.5 MG tablet Take 1 tablet (0.5 mg total) by mouth 3 (three) times daily as needed for Anxiety. 15 tablet 0    cyclobenzaprine (FLEXERIL) 5 MG tablet Take 2 tablets (10 mg total) by mouth 3 (three) times daily as needed for Muscle spasms. 30 tablet 0    HYDROcodone-acetaminophen (NORCO) 7.5-325 mg per tablet Take 1 tablet by mouth every 6 (six) hours as needed for Pain. 28 tablet 0    sildenafil (REVATIO) 20 mg Tab Take 1 tablet (20 mg total) by mouth daily as needed (erectile dysfunction). 30 tablet 1     No current facility-administered medications for this visit.        Allergies  Review of patient's allergies indicates:   Allergen Reactions    Contrast media Other (See Comments)     syncopy       Past Medical History  Past Medical History:   Diagnosis Date    Hyponatremia     Syncope        Surgical History  Past Surgical History:   Procedure Laterality Date    ANTERIOR LUMBAR INTERBODY FUSION (ALIF) N/A 3/31/2020    Procedure: FUSION, SPINE, LUMBAR, ALIF L4-5;  Surgeon: Cholo Mena MD;  Location: [facility];  Service: Orthopedics;  Laterality: N/A;    BONE GRAFT N/A 3/31/2020    Procedure: BONE GRAFT;   Surgeon: Cholo Mena MD;  Location: [facility] OR;  Service: Orthopedics;  Laterality: N/A;    FUSION OF SPINE WITH INSTRUMENTATION N/A 3/31/2020    Procedure: FUSION, SPINE, WITH INSTRUMENTATION L4-5;  Surgeon: Cholo Mena MD;  Location: [facility] OR;  Service: Orthopedics;  Laterality: N/A;  [facility], [employer], DR DAMON, CO-SURGEON    HERNIA REPAIR      SPINE SURGERY  2009       Family History:   Family History   Problem Relation Age of Onset    Hyperlipidemia Mother     Hypertension Mother     Hyperlipidemia Father     Hypertension Father        Social History:   Social History     Socioeconomic History    Marital status: Legally      Spouse name: Not on file    Number of children: 3    Years of education: Not on file    Highest education level: Not on file   Occupational History    Occupation: unemployed   Social Needs    Financial resource strain: Not on file    Food insecurity:     Worry: Not on file     Inability: Not on file    Transportation needs:     Medical: Not on file     Non-medical: Not on file   Tobacco Use    Smoking status: Current Every Day Smoker     Packs/day: 1.00     Years: 19.00     Pack years: 19.00     Types: Cigarettes    Smokeless tobacco: Never Used   Substance and Sexual Activity    Alcohol use: Yes     Alcohol/week: 14.0 standard drinks     Types: 14 Cans of beer per week     Comment: 2 beers per night    Drug use: Yes     Types: Marijuana    Sexual activity: Yes     Partners: Female   Lifestyle    Physical activity:     Days per week: Not on file     Minutes per session: Not on file    Stress: Very much   Relationships    Social connections:     Talks on phone: Not on file     Gets together: Not on file     Attends [employer] service: Not on file     Active member of club or organization: Not on file     Attends meetings of clubs or organizations: Not on file     Relationship status: Not on file   Other Topics Concern    Not on file   Social History  Narrative    Not on file       Hospitalization/Major Diagnostic Procedure:     Review of Systems     General/Constitutional:  Chills denies. Fatigue denies. Fever denies. Weight gain denies. Weight loss denies.    Respiratory:  Shortness of breath denies.    Cardiovascular:  Chest pain denies.    Gastrointestinal:  Constipation denies. Diarrhea denies. Nausea denies. Vomiting denies.     Hematology:  Easy bruising denies. Prolonged bleeding denies.     Genitourinary:  Frequent urination denies. Pain in lower back denies. Painful urination denies.     Musculoskeletal:  See HPI for details    Skin:  Rash denies.    Neurologic:  Dizziness denies. Gait abnormalities denies. Seizures denies. Tingling/Numbess denies.    Psychiatric:  Anxiety denies. Depressed mood denies.     Objective:   Vital Signs:   Vitals:    06/12/20 0944   BP: (!) 138/90        Physical Exam      General Examination:     Constitutional: The patient is alert and oriented to lace person and time. Mood is pleasant.     Head/Face: Normal facial features normal eyebrows    Eyes: Normal extraocular motion bilaterally    Lungs: Respirations are equal and unlabored    Gait is coordinated.    Cardiovascular: There are no swelling or varicosities present.    Lymphatic: Negative for adenopathy    Skin: Normal    Neurological: Level of consciousness normal. Oriented to place person and time and situation    Psychiatric: Oriented to time place person and situation    Mild antalgic gait with a cane. Both of his incisions continue to heal without any signs or symptoms of infection.  Lumbar range of motion is still moderately decreased but much less painful.  Bilateral lower extremities are distal neurovascular intact.  No focal neurological weakness    XRAY Report/ Interpretation:  Lumbar postop AP and lateral x-rays taken in the office today reviewed the patient demonstrates normal postoperative appearance with interbody cage at L4-5 and posterior  instrumentation as well      Assessment:       1. S/P lumbar spinal fusion        Plan:       Josh was seen today for pain.    Diagnoses and all orders for this visit:    S/P lumbar spinal fusion  -     X-Ray Lumbar Spine Ap And Lateral         No follow-ups on file.    At this time recommended he continue to increase activity as tolerated and avoid activities that cause increased pain.  His mild exacerbation of symptoms may be secondary to a reaction from the bone morphogenic protein.  I reassured him that if this is the case it is only temporary.  Follow up in 6 weeks or sooner if needed.  I did prescribe him Mobic as well as amitriptyline    This note was created using Dragon voice recognition software that occasionally misinterpreted phrases or words.

## 2020-06-17 DIAGNOSIS — Z98.1 S/P LUMBAR FUSION: ICD-10-CM

## 2020-06-17 NOTE — TELEPHONE ENCOUNTER
----- Message from Zaria Gonsalez sent at 6/17/2020 11:48 AM CDT -----  Regarding: Medication refill  Refill on Norco 7.5mg.  Patient slipped today.  [phone]

## 2020-06-18 RX ORDER — HYDROCODONE BITARTRATE AND ACETAMINOPHEN 7.5; 325 MG/1; MG/1
1 TABLET ORAL EVERY 6 HOURS PRN
Qty: 28 TABLET | Refills: 0 | Status: SHIPPED | OUTPATIENT
Start: 2020-06-18 | End: 2020-06-24 | Stop reason: SDUPTHER

## 2020-06-24 DIAGNOSIS — Z98.1 S/P LUMBAR FUSION: ICD-10-CM

## 2020-06-24 RX ORDER — HYDROCODONE BITARTRATE AND ACETAMINOPHEN 7.5; 325 MG/1; MG/1
1 TABLET ORAL EVERY 6 HOURS PRN
Qty: 28 TABLET | Refills: 0 | Status: SHIPPED | OUTPATIENT
Start: 2020-06-24 | End: 2020-07-01 | Stop reason: SDUPTHER

## 2020-06-24 NOTE — TELEPHONE ENCOUNTER
----- Message from Radha Rendon sent at 6/24/2020 11:58 AM CDT -----  Regarding: Medication Refill  Contact: Patient  Patient needs a refill on Hydrocodone 7.5 mg, can we send it to [facility].

## 2020-07-01 DIAGNOSIS — M43.16 SPONDYLOLISTHESIS OF LUMBAR REGION: ICD-10-CM

## 2020-07-01 DIAGNOSIS — Z98.1 S/P LUMBAR FUSION: ICD-10-CM

## 2020-07-01 RX ORDER — HYDROCODONE BITARTRATE AND ACETAMINOPHEN 7.5; 325 MG/1; MG/1
1 TABLET ORAL EVERY 6 HOURS PRN
Qty: 28 TABLET | Refills: 0 | Status: SHIPPED | OUTPATIENT
Start: 2020-07-01 | End: 2020-07-08 | Stop reason: SDUPTHER

## 2020-07-01 RX ORDER — CYCLOBENZAPRINE HCL 10 MG
10 TABLET ORAL 3 TIMES DAILY PRN
Qty: 90 TABLET | Refills: 2 | Status: SHIPPED | OUTPATIENT
Start: 2020-07-01 | End: 2020-07-31

## 2020-07-01 NOTE — TELEPHONE ENCOUNTER
----- Message from Vivian Crystal sent at 7/1/2020  9:50 AM CDT -----  Contact: Pt  Pt states he needs a Rx refill of Hydrocodone 7.5 and Cyclophenazine 5mg . Pt call back # [phone].

## 2020-07-08 DIAGNOSIS — Z98.1 S/P LUMBAR FUSION: ICD-10-CM

## 2020-07-08 RX ORDER — HYDROCODONE BITARTRATE AND ACETAMINOPHEN 7.5; 325 MG/1; MG/1
1 TABLET ORAL EVERY 6 HOURS PRN
Qty: 28 TABLET | Refills: 0 | Status: SHIPPED | OUTPATIENT
Start: 2020-07-08 | End: 2020-07-15 | Stop reason: SDUPTHER

## 2020-07-15 DIAGNOSIS — Z98.1 S/P LUMBAR FUSION: ICD-10-CM

## 2020-07-15 RX ORDER — HYDROCODONE BITARTRATE AND ACETAMINOPHEN 7.5; 325 MG/1; MG/1
1 TABLET ORAL EVERY 6 HOURS PRN
Qty: 28 TABLET | Refills: 0 | Status: SHIPPED | OUTPATIENT
Start: 2020-07-15 | End: 2020-07-22 | Stop reason: SDUPTHER

## 2020-07-15 NOTE — TELEPHONE ENCOUNTER
----- Message from Radha Rendon sent at 7/15/2020  9:15 AM CDT -----  Regarding: Medication Refill Request  Contact: Patient  Patient is requesting a refill on Hydrocodone 7.5 mg, can we send it to [facility].

## 2020-07-22 DIAGNOSIS — Z98.1 S/P LUMBAR FUSION: ICD-10-CM

## 2020-07-22 RX ORDER — HYDROCODONE BITARTRATE AND ACETAMINOPHEN 7.5; 325 MG/1; MG/1
1 TABLET ORAL EVERY 6 HOURS PRN
Qty: 28 TABLET | Refills: 0 | Status: SHIPPED | OUTPATIENT
Start: 2020-07-22 | End: 2020-07-24

## 2020-07-22 NOTE — TELEPHONE ENCOUNTER
----- Message from Radha Rendon sent at 7/21/2020  3:38 PM CDT -----  Regarding: Medication Refill Request  Contact: Patient  Patient needs a refill on Hydrocodone 7.5 mg, can we send it to [facility].

## 2020-07-24 ENCOUNTER — OFFICE VISIT (OUTPATIENT)
Dept: ORTHOPEDICS | Facility: CLINIC | Age: 50
End: 2020-07-24
Payer: MEDICARE

## 2020-07-24 VITALS — BODY MASS INDEX: 20.69 KG/M2 | SYSTOLIC BLOOD PRESSURE: 156 MMHG | WEIGHT: 170 LBS | DIASTOLIC BLOOD PRESSURE: 91 MMHG

## 2020-07-24 DIAGNOSIS — M43.16 SPONDYLOLISTHESIS AT L4-L5 LEVEL: ICD-10-CM

## 2020-07-24 DIAGNOSIS — M51.36 DISC DEGENERATION, LUMBAR: ICD-10-CM

## 2020-07-24 DIAGNOSIS — M54.16 LUMBAR RADICULITIS: ICD-10-CM

## 2020-07-24 DIAGNOSIS — Z98.1 HISTORY OF LUMBAR FUSION: Primary | ICD-10-CM

## 2020-07-24 DIAGNOSIS — M48.061 LUMBAR STENOSIS WITHOUT NEUROGENIC CLAUDICATION: ICD-10-CM

## 2020-07-24 PROCEDURE — 99213 OFFICE O/P EST LOW 20 MIN: CPT | Mod: S$GLB,,, | Performed by: PHYSICIAN ASSISTANT

## 2020-07-24 PROCEDURE — 99213 PR OFFICE/OUTPT VISIT, EST, LEVL III, 20-29 MIN: ICD-10-PCS | Mod: S$GLB,,, | Performed by: PHYSICIAN ASSISTANT

## 2020-07-24 RX ORDER — GABAPENTIN 300 MG/1
CAPSULE ORAL
Qty: 90 CAPSULE | Refills: 2 | Status: SHIPPED | OUTPATIENT
Start: 2020-07-24 | End: 2020-08-17

## 2020-07-24 RX ORDER — HYDROCODONE BITARTRATE AND ACETAMINOPHEN 5; 325 MG/1; MG/1
1 TABLET ORAL EVERY 6 HOURS PRN
Qty: 28 TABLET | Refills: 0 | Status: SHIPPED | OUTPATIENT
Start: 2020-07-24 | End: 2020-07-31

## 2020-07-24 NOTE — PROGRESS NOTES
Subjective:    Patient ID: Josh Coulter is a 49 y.o. male.    Chief Complaint: Post-op Evaluation of the Lumbar Spine (PO, s/p Lumbar Fusion L4-5 03/31/20 6 wk follow up.  He has constant moderate pain. No other treatment)      History of Present Illness  Patient is here about 4 months status post L4-5 anterior lumbar interbody fusion with posterior decompression and posterior lateral fusion using instrumentation.  He also has the past history of L5-S1 fusion.  Overall his back pain is much improved since the surgery but he continues to have some lower extremity radicular symptoms and weakness.  He reports that his knees and his foot on the right occasionally give out on him.  He also has multiple other orthopedic issues including both knees and his cervical spine.    Current Medications  Current Outpatient Medications   Medication Sig Dispense Refill    clonazePAM (KLONOPIN) 0.5 MG tablet Take 1 tablet (0.5 mg total) by mouth 3 (three) times daily as needed for Anxiety. 15 tablet 0    cyclobenzaprine (FLEXERIL) 10 MG tablet Take 1 tablet (10 mg total) by mouth 3 (three) times daily as needed for Muscle spasms. 90 tablet 2    sildenafil (REVATIO) 20 mg Tab Take 1 tablet (20 mg total) by mouth daily as needed (erectile dysfunction). 30 tablet 1    amitriptyline (ELAVIL) 25 MG tablet Take 1 tablet (25 mg total) by mouth nightly as needed for Insomnia. 30 tablet 2    gabapentin (NEURONTIN) 300 MG capsule Take 1 tablet every night x 7 days. Increase to twice a day x 7 days. Increase to three times a day. 90 capsule 2    HYDROcodone-acetaminophen (NORCO) 5-325 mg per tablet Take 1 tablet by mouth every 6 (six) hours as needed for Pain. 28 tablet 0     No current facility-administered medications for this visit.        Allergies  Review of patient's allergies indicates:   Allergen Reactions    Contrast media Other (See Comments)     syncopy       Past Medical History  Past Medical History:   Diagnosis Date     Hyponatremia     Syncope        Surgical History  Past Surgical History:   Procedure Laterality Date    ANTERIOR LUMBAR INTERBODY FUSION (ALIF) N/A 3/31/2020    Procedure: FUSION, SPINE, LUMBAR, ALIF L4-5;  Surgeon: Cholo Mena MD;  Location: [facility] OR;  Service: Orthopedics;  Laterality: N/A;    BONE GRAFT N/A 3/31/2020    Procedure: BONE GRAFT;  Surgeon: Cholo Mena MD;  Location: [facility] OR;  Service: Orthopedics;  Laterality: N/A;    FUSION OF SPINE WITH INSTRUMENTATION N/A 3/31/2020    Procedure: FUSION, SPINE, WITH INSTRUMENTATION L4-5;  Surgeon: Cholo Mena MD;  Location: [facility] OR;  Service: Orthopedics;  Laterality: N/A;  [facility], [employer], DR DAMON, CO-SURGEON    HERNIA REPAIR      SPINE SURGERY  2009       Family History:   Family History   Problem Relation Age of Onset    Hyperlipidemia Mother     Hypertension Mother     Hyperlipidemia Father     Hypertension Father        Social History:   Social History     Socioeconomic History    Marital status: Legally      Spouse name: Not on file    Number of children: 3    Years of education: Not on file    Highest education level: Not on file   Occupational History    Occupation: unemployed   Social Needs    Financial resource strain: Not on file    Food insecurity     Worry: Not on file     Inability: Not on file    Transportation needs     Medical: Not on file     Non-medical: Not on file   Tobacco Use    Smoking status: Current Every Day Smoker     Packs/day: 1.00     Years: 19.00     Pack years: 19.00     Types: Cigarettes    Smokeless tobacco: Never Used   Substance and Sexual Activity    Alcohol use: Yes     Alcohol/week: 14.0 standard drinks     Types: 14 Cans of beer per week     Comment: 2 beers per night    Drug use: Yes     Types: Marijuana    Sexual activity: Yes     Partners: Female   Lifestyle    Physical activity     Days per week: Not on file     Minutes per session: Not on file    Stress: Very much    Relationships    Social connections     Talks on phone: Not on file     Gets together: Not on file     Attends Ep[employer]alian service: Not on file     Active member of club or organization: Not on file     Attends meetings of clubs or organizations: Not on file     Relationship status: Not on file   Other Topics Concern    Not on file   Social History Narrative    Not on file       Date of surgery:  03/31/2020    Review of Systems     General/Constitutional: Chills denies. Fatigue denies. Fever denies. Weight gain denies. Weight loss denies.    Musculoskeletal: Comments: See HPI for details    Skin: Rash denies.    Objective:   Vital Signs:   Vitals:    07/24/20 1001   BP: (!) 156/91        Physical Exam    This a well-developed, well nourished patient in no acute distress.  They are alert and oriented and cooperative to examination.  Pt. walks without an antalgic gait.      General Examination:     Constitutional: The patient is alert and oriented to lace person and time. Mood is pleasant.     Head/Face: Normal facial features normal eyebrows    Eyes: Normal extraocular motion bilaterally    Lungs: Respirations are equal and unlabored    Gait is coordinated.    Cardiovascular: There are no swelling or varicosities present.    Lymphatic: Negative for adenopathy    Skin: Normal    Neurological: Level of consciousness normal. Oriented to place person and time and situation    Psychiatric: Oriented to time place person and situation    Mild antalgic gait with a cane. Both of his incisions continue to heal without any signs or symptoms of infection.  Lumbar range of motion is still moderately decreased but much less painful.  Bilateral lower extremities are distal neurovascular intact.  No focal neurological weakness    XRAY Report/ Interpretation:  Lumbar AP and lateral postop x-rays taken in the office today reviewed the patient demonstrate a normal postoperative appearance with no acute abnormalities.       Assessment:       1. History of lumbar fusion    2. Disc degeneration, lumbar    3. Lumbar stenosis without neurogenic claudication    4. Spondylolisthesis at L4-L5 level    5. Lumbar radiculitis        Plan:       Josh was seen today for post-op evaluation.    Diagnoses and all orders for this visit:    History of lumbar fusion  -     X-Ray Lumbar Spine Ap And Lateral  -     gabapentin (NEURONTIN) 300 MG capsule; Take 1 tablet every night x 7 days. Increase to twice a day x 7 days. Increase to three times a day.  -     HYDROcodone-acetaminophen (NORCO) 5-325 mg per tablet; Take 1 tablet by mouth every 6 (six) hours as needed for Pain.    Disc degeneration, lumbar  -     gabapentin (NEURONTIN) 300 MG capsule; Take 1 tablet every night x 7 days. Increase to twice a day x 7 days. Increase to three times a day.  -     HYDROcodone-acetaminophen (NORCO) 5-325 mg per tablet; Take 1 tablet by mouth every 6 (six) hours as needed for Pain.    Lumbar stenosis without neurogenic claudication  -     gabapentin (NEURONTIN) 300 MG capsule; Take 1 tablet every night x 7 days. Increase to twice a day x 7 days. Increase to three times a day.  -     HYDROcodone-acetaminophen (NORCO) 5-325 mg per tablet; Take 1 tablet by mouth every 6 (six) hours as needed for Pain.    Spondylolisthesis at L4-L5 level  -     gabapentin (NEURONTIN) 300 MG capsule; Take 1 tablet every night x 7 days. Increase to twice a day x 7 days. Increase to three times a day.  -     HYDROcodone-acetaminophen (NORCO) 5-325 mg per tablet; Take 1 tablet by mouth every 6 (six) hours as needed for Pain.    Lumbar radiculitis         Follow up in about 3 weeks (around 8/14/2020).    At this time we will start him on gabapentin 300 mg advanced to 3 times a day.  We will start to wean his pain medication or decrease the dosage starting today.  However due to the persistent lower extremity symptoms I do recommend an updated bilateral lower extremity EMG and nerve  conduction study.  Would like to see him back in about 3 weeks to review the results of the study.  After we feel like his lumbar spine has significantly healed we can address both knees and infection probably get some bilateral knee x-rays when he is here next.  Then we can address the cervical spine in the future.      This note was created using Dragon voice recognition software that occasionally misinterpreted phrases or words.

## 2020-07-27 ENCOUNTER — TELEPHONE (OUTPATIENT)
Dept: PHYSICAL MEDICINE AND REHAB | Facility: CLINIC | Age: 50
End: 2020-07-27

## 2020-08-17 ENCOUNTER — OFFICE VISIT (OUTPATIENT)
Dept: ORTHOPEDICS | Facility: CLINIC | Age: 50
End: 2020-08-17
Payer: MEDICARE

## 2020-08-17 VITALS
WEIGHT: 170 LBS | DIASTOLIC BLOOD PRESSURE: 88 MMHG | BODY MASS INDEX: 20.69 KG/M2 | SYSTOLIC BLOOD PRESSURE: 124 MMHG | HEART RATE: 120 BPM

## 2020-08-17 DIAGNOSIS — R29.6 FREQUENT FALLS: ICD-10-CM

## 2020-08-17 DIAGNOSIS — Z98.1 HISTORY OF LUMBAR FUSION: ICD-10-CM

## 2020-08-17 DIAGNOSIS — M25.561 CHRONIC PAIN OF BOTH KNEES: Primary | ICD-10-CM

## 2020-08-17 DIAGNOSIS — M25.562 CHRONIC PAIN OF BOTH KNEES: Primary | ICD-10-CM

## 2020-08-17 DIAGNOSIS — M54.16 LUMBAR RADICULITIS: ICD-10-CM

## 2020-08-17 DIAGNOSIS — G89.29 CHRONIC PAIN OF BOTH KNEES: Primary | ICD-10-CM

## 2020-08-17 PROCEDURE — 99213 PR OFFICE/OUTPT VISIT, EST, LEVL III, 20-29 MIN: ICD-10-PCS | Mod: S$GLB,,, | Performed by: ORTHOPAEDIC SURGERY

## 2020-08-17 PROCEDURE — 99213 OFFICE O/P EST LOW 20 MIN: CPT | Mod: S$GLB,,, | Performed by: ORTHOPAEDIC SURGERY

## 2020-08-17 RX ORDER — HYDROCODONE BITARTRATE AND ACETAMINOPHEN 7.5; 325 MG/1; MG/1
1 TABLET ORAL EVERY 6 HOURS PRN
COMMUNITY
End: 2020-08-17 | Stop reason: SDUPTHER

## 2020-08-17 RX ORDER — HYDROCODONE BITARTRATE AND ACETAMINOPHEN 7.5; 325 MG/1; MG/1
1 TABLET ORAL EVERY 6 HOURS PRN
Qty: 28 TABLET | Refills: 0 | Status: SHIPPED | OUTPATIENT
Start: 2020-08-17 | End: 2020-10-05

## 2020-08-17 RX ORDER — PREGABALIN 75 MG/1
75 CAPSULE ORAL 2 TIMES DAILY
Qty: 60 CAPSULE | Refills: 2 | Status: SHIPPED | OUTPATIENT
Start: 2020-08-17 | End: 2020-08-26

## 2020-08-17 NOTE — PROGRESS NOTES
Subjective:       Patient ID: Josh Coulter is a 49 y.o. male.    Chief Complaint: Pain of the Lumbar Spine (Lumbar is a little better. He has some pulling in lumbar. Pain radiates down both legs to the feet with numbness and he falls a lot. He will have EMG done 8-26)      History of Present Illness  Patient is here about 5 months status post L4-5 anterior lumbar interbody fusion with posterior decompression and posterior lateral fusion using instrumentation.  He also has the past history of L5-S1 fusion.  Overall his back pain is much improved since the surgery but he continues to have some lower extremity radicular symptoms and weakness.  He reports that his knees and his foot on the right occasionally give out on him.   this is caused him to have multiple recurrent falls.  He also has multiple other orthopedic issues including both knees and his cervical spine.    When he was last here we ordered an EMG nerve conduction study of the lower extremities.  He has not had this done but is scheduled for this to take place on 08/26/2020.    Unable to tolerate the gabapentin secondary to suicidal ideation as a side effect    Current Medications  Current Outpatient Medications   Medication Sig Dispense Refill    clonazePAM (KLONOPIN) 0.5 MG tablet Take 1 tablet (0.5 mg total) by mouth 3 (three) times daily as needed for Anxiety. 15 tablet 0    HYDROcodone-acetaminophen (NORCO) 7.5-325 mg per tablet Take 1 tablet by mouth every 6 (six) hours as needed for Pain.      sildenafil (REVATIO) 20 mg Tab Take 1 tablet (20 mg total) by mouth daily as needed (erectile dysfunction). 30 tablet 1    amitriptyline (ELAVIL) 25 MG tablet Take 1 tablet (25 mg total) by mouth nightly as needed for Insomnia. 30 tablet 2     No current facility-administered medications for this visit.        Allergies  Review of patient's allergies indicates:   Allergen Reactions    Contrast media Other (See Comments)     syncopy       Past Medical  History  Past Medical History:   Diagnosis Date    Hyponatremia     Syncope        Surgical History  Past Surgical History:   Procedure Laterality Date    ANTERIOR LUMBAR INTERBODY FUSION (ALIF) N/A 3/31/2020    Procedure: FUSION, SPINE, LUMBAR, ALIF L4-5;  Surgeon: Cholo Mena MD;  Location: [facility] OR;  Service: Orthopedics;  Laterality: N/A;    BONE GRAFT N/A 3/31/2020    Procedure: BONE GRAFT;  Surgeon: Cholo Mena MD;  Location: [facility] OR;  Service: Orthopedics;  Laterality: N/A;    FUSION OF SPINE WITH INSTRUMENTATION N/A 3/31/2020    Procedure: FUSION, SPINE, WITH INSTRUMENTATION L4-5;  Surgeon: Cholo Mena MD;  Location: [facility] OR;  Service: Orthopedics;  Laterality: N/A;  [facility], [employer], DR DAMON, CO-SURGEON    HERNIA REPAIR      SPINE SURGERY  2009       Family History:   Family History   Problem Relation Age of Onset    Hyperlipidemia Mother     Hypertension Mother     Hyperlipidemia Father     Hypertension Father        Social History:   Social History     Socioeconomic History    Marital status: Legally      Spouse name: Not on file    Number of children: 3    Years of education: Not on file    Highest education level: Not on file   Occupational History    Occupation: unemployed   Social Needs    Financial resource strain: Not on file    Food insecurity     Worry: Not on file     Inability: Not on file    Transportation needs     Medical: Not on file     Non-medical: Not on file   Tobacco Use    Smoking status: Current Every Day Smoker     Packs/day: 1.00     Years: 19.00     Pack years: 19.00     Types: Cigarettes    Smokeless tobacco: Never Used   Substance and Sexual Activity    Alcohol use: Yes     Alcohol/week: 14.0 standard drinks     Types: 14 Cans of beer per week     Comment: 2 beers per night    Drug use: Yes     Types: Marijuana    Sexual activity: Yes     Partners: Female   Lifestyle    Physical activity     Days per week: Not on file     Minutes  per session: Not on file    Stress: Very much   Relationships    Social connections     Talks on phone: Not on file     Gets together: Not on file     Attends Hinduism service: Not on file     Active member of club or organization: Not on file     Attends meetings of clubs or organizations: Not on file     Relationship status: Not on file   Other Topics Concern    Not on file   Social History Narrative    Not on file       Hospitalization/Major Diagnostic Procedure:     Review of Systems     General/Constitutional:  Chills denies. Fatigue denies. Fever denies. Weight gain denies. Weight loss denies.    Respiratory:  Shortness of breath denies.    Cardiovascular:  Chest pain denies.    Gastrointestinal:  Constipation denies. Diarrhea denies. Nausea denies. Vomiting denies.     Hematology:  Easy bruising denies. Prolonged bleeding denies.     Genitourinary:  Frequent urination denies. Pain in lower back denies. Painful urination denies.     Musculoskeletal:  See HPI for details    Skin:  Rash denies.    Neurologic:  Dizziness denies. Gait abnormalities denies. Seizures denies. Tingling/Numbess denies.    Psychiatric:  Anxiety denies. Depressed mood denies.     Objective:   Vital Signs:   Vitals:    08/17/20 1047   BP: 124/88   Pulse: (!) 120        Physical Exam      General Examination:     Constitutional: The patient is alert and oriented to lace person and time. Mood is pleasant.     Head/Face: Normal facial features normal eyebrows    Eyes: Normal extraocular motion bilaterally    Lungs: Respirations are equal and unlabored    Gait is coordinated.    Cardiovascular: There are no swelling or varicosities present.    Lymphatic: Negative for adenopathy    Skin: Normal    Neurological: Level of consciousness normal. Oriented to place person and time and situation    Psychiatric: Oriented to time place person and situation    Mild antalgic gait with a cane. Both of his incisions continue to heal without any signs  "or symptoms of infection.  Lumbar range of motion is still moderately decreased but much less painful.  Bilateral lower extremities are distal neurovascular intact.  No focal neurological weakness.    XRAY Report/ Interpretation:  Lumbar postop AP and lateral x-rays taken in the office today and reviewed the patient demonstrating normal postoperative appearance status post a previous L5-S1 interbody fusion with most recently L4-5 interbody fusion with posterior lateral instrumentation decompression.      Assessment:       1. Chronic pain of both knees    2. History of lumbar fusion    3. Lumbar radiculitis    4. Frequent falls        Plan:       Josh was seen today for pain.    Diagnoses and all orders for this visit:    Chronic pain of both knees  -     Cancel: X-Ray Knee 1 or 2 View Bilateral    History of lumbar fusion  -     X-Ray Lumbar Spine Ap And Lateral    Lumbar radiculitis    Frequent falls    Other orders  -     Cancel: X-Ray Lumbar Spine Ap And Lateral         No follow-ups on file.  Pete Guzmán, physician's assistant served in the capacity as a "scribe" for this patient encounter  A "face to face" encounter occurred with Dr. Mena on this date  The treatment plan and medical decision making is outlined below:  Proceed with the EMG nerve conduction study of the lower extremities as scheduled.  Follow-up about 2 or 3 weeks afterwards so that the report is completed by then and that we can review the results.  Regarding his knee and his cervical spine, we cannot formally treat him until he has referral from his primary care provider to us to evaluate him.  Trial of Lyrica 75 mg b.i.d.    This note was created using Dragon voice recognition software that occasionally misinterpreted phrases or words.    "

## 2020-08-19 ENCOUNTER — TELEPHONE (OUTPATIENT)
Dept: FAMILY MEDICINE | Facility: CLINIC | Age: 50
End: 2020-08-19

## 2020-08-19 DIAGNOSIS — M25.561 CHRONIC PAIN OF BOTH KNEES: Primary | ICD-10-CM

## 2020-08-19 DIAGNOSIS — M25.562 CHRONIC PAIN OF BOTH KNEES: Primary | ICD-10-CM

## 2020-08-19 DIAGNOSIS — M54.2 CERVICAL SPINE PAIN: ICD-10-CM

## 2020-08-19 DIAGNOSIS — G89.29 CHRONIC PAIN OF BOTH KNEES: Primary | ICD-10-CM

## 2020-08-19 PROBLEM — Z72.0 TOBACCO USE: Status: ACTIVE | Noted: 2018-01-14

## 2020-08-19 PROBLEM — R06.83 SNORING: Status: ACTIVE | Noted: 2017-12-28

## 2020-08-19 NOTE — TELEPHONE ENCOUNTER
Placed additional referral to Dr. Mena for pt's reported bilateral knee pain and cervical spine issues per Dr. Mena's note.

## 2020-08-26 ENCOUNTER — PROCEDURE VISIT (OUTPATIENT)
Dept: PHYSICAL MEDICINE AND REHAB | Facility: CLINIC | Age: 50
End: 2020-08-26
Payer: MEDICAID

## 2020-08-26 ENCOUNTER — OFFICE VISIT (OUTPATIENT)
Dept: ORTHOPEDICS | Facility: CLINIC | Age: 50
End: 2020-08-26
Payer: MEDICARE

## 2020-08-26 VITALS — DIASTOLIC BLOOD PRESSURE: 91 MMHG | BODY MASS INDEX: 20.69 KG/M2 | WEIGHT: 170 LBS | SYSTOLIC BLOOD PRESSURE: 139 MMHG

## 2020-08-26 DIAGNOSIS — G60.9 IDIOPATHIC PERIPHERAL NEUROPATHY: ICD-10-CM

## 2020-08-26 DIAGNOSIS — M54.2 CERVICAL SPINE PAIN: ICD-10-CM

## 2020-08-26 DIAGNOSIS — Z98.1 HISTORY OF LUMBAR FUSION: ICD-10-CM

## 2020-08-26 DIAGNOSIS — M48.061 LUMBAR STENOSIS WITHOUT NEUROGENIC CLAUDICATION: ICD-10-CM

## 2020-08-26 DIAGNOSIS — M17.11 PRIMARY OSTEOARTHRITIS OF RIGHT KNEE: ICD-10-CM

## 2020-08-26 DIAGNOSIS — M54.16 LUMBAR RADICULITIS: ICD-10-CM

## 2020-08-26 DIAGNOSIS — M17.12 PRIMARY OSTEOARTHRITIS OF LEFT KNEE: ICD-10-CM

## 2020-08-26 DIAGNOSIS — R29.6 FREQUENT FALLS: ICD-10-CM

## 2020-08-26 DIAGNOSIS — M25.561 CHRONIC PAIN OF BOTH KNEES: Primary | ICD-10-CM

## 2020-08-26 DIAGNOSIS — G60.8 POLYNEUROPATHY, PERIPHERAL SENSORIMOTOR AXONAL: Primary | ICD-10-CM

## 2020-08-26 DIAGNOSIS — G89.29 CHRONIC PAIN OF BOTH KNEES: Primary | ICD-10-CM

## 2020-08-26 DIAGNOSIS — G62.1 ALCOHOLIC PERIPHERAL NEUROPATHY: ICD-10-CM

## 2020-08-26 DIAGNOSIS — M25.562 CHRONIC PAIN OF BOTH KNEES: Primary | ICD-10-CM

## 2020-08-26 PROCEDURE — 99213 PR OFFICE/OUTPT VISIT, EST, LEVL III, 20-29 MIN: ICD-10-PCS | Mod: 25,S$GLB,, | Performed by: ORTHOPAEDIC SURGERY

## 2020-08-26 PROCEDURE — 95911 NRV CNDJ TEST 9-10 STUDIES: CPT | Mod: 26,S$PBB,, | Performed by: PHYSICAL MEDICINE & REHABILITATION

## 2020-08-26 PROCEDURE — 99213 OFFICE O/P EST LOW 20 MIN: CPT | Mod: 25,S$GLB,, | Performed by: ORTHOPAEDIC SURGERY

## 2020-08-26 PROCEDURE — 20610 LARGE JOINT ASPIRATION/INJECTION: R KNEE: ICD-10-PCS | Mod: 50,S$GLB,, | Performed by: ORTHOPAEDIC SURGERY

## 2020-08-26 PROCEDURE — 95886 MUSC TEST DONE W/N TEST COMP: CPT | Mod: 26,S$PBB,, | Performed by: PHYSICAL MEDICINE & REHABILITATION

## 2020-08-26 PROCEDURE — 20610 DRAIN/INJ JOINT/BURSA W/O US: CPT | Mod: 50,S$GLB,, | Performed by: ORTHOPAEDIC SURGERY

## 2020-08-26 PROCEDURE — 95885 PR MUSC TST DONE W/NERV TST LIM: ICD-10-PCS | Mod: 26,S$PBB,59, | Performed by: PHYSICAL MEDICINE & REHABILITATION

## 2020-08-26 PROCEDURE — 95885 MUSC TST DONE W/NERV TST LIM: CPT | Mod: PBBFAC,PN | Performed by: PHYSICAL MEDICINE & REHABILITATION

## 2020-08-26 PROCEDURE — 95911 NRV CNDJ TEST 9-10 STUDIES: CPT | Mod: PBBFAC,PN | Performed by: PHYSICAL MEDICINE & REHABILITATION

## 2020-08-26 PROCEDURE — 95911 PR NERVE CONDUCTION STUDY; 9-10 STUDIES: ICD-10-PCS | Mod: 26,S$PBB,, | Performed by: PHYSICAL MEDICINE & REHABILITATION

## 2020-08-26 PROCEDURE — 95886 PR EMG COMPLETE, W/ NERVE CONDUCTION STUDIES, 5+ MUSCLES: ICD-10-PCS | Mod: 26,S$PBB,, | Performed by: PHYSICAL MEDICINE & REHABILITATION

## 2020-08-26 PROCEDURE — 95886 MUSC TEST DONE W/N TEST COMP: CPT | Mod: PBBFAC,PN,59,50 | Performed by: PHYSICAL MEDICINE & REHABILITATION

## 2020-08-26 PROCEDURE — 95885 MUSC TST DONE W/NERV TST LIM: CPT | Mod: 26,S$PBB,59, | Performed by: PHYSICAL MEDICINE & REHABILITATION

## 2020-08-26 RX ORDER — METHYLPREDNISOLONE ACETATE 40 MG/ML
40 INJECTION, SUSPENSION INTRA-ARTICULAR; INTRALESIONAL; INTRAMUSCULAR; SOFT TISSUE
Status: DISCONTINUED | OUTPATIENT
Start: 2020-08-26 | End: 2020-08-26 | Stop reason: HOSPADM

## 2020-08-26 RX ORDER — HYDROCODONE BITARTRATE AND ACETAMINOPHEN 5; 325 MG/1; MG/1
1 TABLET ORAL EVERY 6 HOURS PRN
Qty: 28 TABLET | Refills: 0 | Status: SHIPPED | OUTPATIENT
Start: 2020-08-26 | End: 2020-09-09

## 2020-08-26 RX ADMIN — METHYLPREDNISOLONE ACETATE 40 MG: 40 INJECTION, SUSPENSION INTRA-ARTICULAR; INTRALESIONAL; INTRAMUSCULAR; SOFT TISSUE at 02:08

## 2020-08-26 NOTE — LETTER
August 26, 2020      Elana Lopez, FNP  [address]  [address]  [facility] [address]           [facility]  [address]  [facility] [address]  Phone: [phone]  Fax: [phone]          Patient: Josh Coulter   MR Number: [id number]   YOB: 1970   Date of Visit: 8/26/2020       Dear Elana Lopez:    Thank you for referring Josh Coulter to me for evaluation. Attached you will find relevant portions of my assessment and plan of care.    If you have questions, please do not hesitate to call me. I look forward to following Josh Coulter along with you.    Sincerely,    Cholo Mena MD    Enclosure  CC:  No Recipients    If you would like to receive this communication electronically, please contact [email or web address] or [phone] to request more information on [employer] Link access.    For providers and/or their staff who would like to refer a patient to [facility], please contact us through our one-stop-shop provider referral line, [facility], at [phone].    If you feel you have received this communication in error or would no longer like to receive these types of communications, please e-mail [email or web address]

## 2020-08-26 NOTE — PROGRESS NOTES
Subjective:       Patient ID: Josh Coulter is a 49 y.o. male.    Chief Complaint: Pain of the Left Knee (Bilateral knee pain x years. He had EMG done this morning by Dr Bosch) and Pain of the Right Knee      History of Present Illness  Patient is here today with chief complaint of bilateral knee pain right greater than left.  This is chronic.  Remote history of falls about 3 or 4 years ago because some exacerbation of his pain.    Current Medications  Current Outpatient Medications   Medication Sig Dispense Refill    clonazePAM (KLONOPIN) 0.5 MG tablet Take 1 tablet (0.5 mg total) by mouth 3 (three) times daily as needed for Anxiety. 15 tablet 0    HYDROcodone-acetaminophen (NORCO) 7.5-325 mg per tablet Take 1 tablet by mouth every 6 (six) hours as needed for Pain. 28 tablet 0    sildenafil (REVATIO) 20 mg Tab Take 1 tablet (20 mg total) by mouth daily as needed (erectile dysfunction). 30 tablet 1    amitriptyline (ELAVIL) 25 MG tablet Take 1 tablet (25 mg total) by mouth nightly as needed for Insomnia. 30 tablet 2     No current facility-administered medications for this visit.        Allergies  Review of patient's allergies indicates:   Allergen Reactions    Contrast media Other (See Comments)     syncopy       Past Medical History  Past Medical History:   Diagnosis Date    Hyponatremia     Syncope        Surgical History  Past Surgical History:   Procedure Laterality Date    ANTERIOR LUMBAR INTERBODY FUSION (ALIF) N/A 3/31/2020    Procedure: FUSION, SPINE, LUMBAR, ALIF L4-5;  Surgeon: Cholo Mena MD;  Location: [facility] OR;  Service: Orthopedics;  Laterality: N/A;    BONE GRAFT N/A 3/31/2020    Procedure: BONE GRAFT;  Surgeon: Cholo Mena MD;  Location: [facility] OR;  Service: Orthopedics;  Laterality: N/A;    FUSION OF SPINE WITH INSTRUMENTATION N/A 3/31/2020    Procedure: FUSION, SPINE, WITH INSTRUMENTATION L4-5;  Surgeon: Cholo Mena MD;  Location: [facility] OR;  Service: Orthopedics;  Laterality:  N/A;  [facility], [employer], DR DAMON, CO-SURGEON    HERNIA REPAIR      SPINE SURGERY  2009       Family History:   Family History   Problem Relation Age of Onset    Hyperlipidemia Mother     Hypertension Mother     Hyperlipidemia Father     Hypertension Father        Social History:   Social History     Socioeconomic History    Marital status: Legally      Spouse name: Not on file    Number of children: 3    Years of education: Not on file    Highest education level: Not on file   Occupational History    Occupation: unemployed   Social Needs    Financial resource strain: Not on file    Food insecurity     Worry: Not on file     Inability: Not on file    Transportation needs     Medical: Not on file     Non-medical: Not on file   Tobacco Use    Smoking status: Current Every Day Smoker     Packs/day: 1.00     Years: 19.00     Pack years: 19.00     Types: Cigarettes    Smokeless tobacco: Never Used   Substance and Sexual Activity    Alcohol use: Yes     Alcohol/week: 14.0 standard drinks     Types: 14 Cans of beer per week     Comment: 2 beers per night    Drug use: Yes     Types: Marijuana    Sexual activity: Yes     Partners: Female   Lifestyle    Physical activity     Days per week: Not on file     Minutes per session: Not on file    Stress: Very much   Relationships    Social connections     Talks on phone: Not on file     Gets together: Not on file     Attends Druze service: Not on file     Active member of club or organization: Not on file     Attends meetings of clubs or organizations: Not on file     Relationship status: Not on file   Other Topics Concern    Not on file   Social History Narrative    Not on file       Hospitalization/Major Diagnostic Procedure:     Review of Systems     General/Constitutional:  Chills denies. Fatigue denies. Fever denies. Weight gain denies. Weight loss denies.    Respiratory:  Shortness of breath denies.    Cardiovascular:  Chest pain  denies.    Gastrointestinal:  Constipation denies. Diarrhea denies. Nausea denies. Vomiting denies.     Hematology:  Easy bruising denies. Prolonged bleeding denies.     Genitourinary:  Frequent urination denies. Pain in lower back denies. Painful urination denies.     Musculoskeletal:  See HPI for details    Skin:  Rash denies.    Neurologic:  Dizziness denies. Gait abnormalities denies. Seizures denies. Tingling/Numbess denies.    Psychiatric:  Anxiety denies. Depressed mood denies.     Objective:   Vital Signs:   Vitals:    08/26/20 1441   BP: (!) 139/91        Physical Exam      General Examination:     Constitutional: The patient is alert and oriented to lace person and time. Mood is pleasant.     Head/Face: Normal facial features normal eyebrows    Eyes: Normal extraocular motion bilaterally    Lungs: Respirations are equal and unlabored    Gait is coordinated.    Cardiovascular: There are no swelling or varicosities present.    Lymphatic: Negative for adenopathy    Skin: Normal    Neurological: Level of consciousness normal. Oriented to place person and time and situation    Psychiatric: Oriented to time place person and situation    Bilateral knee exam:  Antalgic gait with a straight cane.  Both knees with mild effusion and positive ballottement.  Mild medial joint line tenderness palpation.  Full active range of motion and normal strength with no significant ligamentous instability.      XRAY Report/ Interpretation:  Bilateral knee x-rays taken in the office today reviewed the patient AP and lateral views demonstrate mild decreased medial joint spacing but no other abnormality.    Bilateral lower extremity EMG nerve conduction study was done today and demonstrates significant peripheral neuropathy    1. 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  2. There is EMG evidence of acute on chronic denervation of the left tibialis anterior, peroneus longus, medial gastroc, EHL, and dorsal interossei with normal EMG of the left gluteus medius and mahsa muscles.  These findings tend to favour severe length-dependent peripheral neuropathy over an acute on chronic left L5 and S1 radiculopathies.      Assessment:       1. Chronic pain of both knees    2. Cervical spine pain    3. Idiopathic peripheral neuropathy        Plan:       Josh was seen today for pain and pain.    Diagnoses and all orders for this visit:    Chronic pain of both knees  -     X-Ray Knee 1 or 2 View Bilateral    Cervical spine pain  -     Ambulatory referral/consult to Orthopedics    Idiopathic peripheral neuropathy         No follow-ups on file.  Pete Guzmán, physician's assistant served in the capacity as a "scribe" for this patient encounter  A "face to face" encounter occurred with Dr. Mena on this date  The treatment plan and medical decision making is outlined below:  We have prescribed Lyrica which needs prior authorization because he was unable to tolerate gabapentin.  We have done the prior authorization paperwork but the patient has not received the medication yet.  For the knee pain today he was given bilateral knee intra-articular injections with 2 cc of lidocaine and 80 mg of Depo-Medrol each knee.  This was done using a sterile technique.  Please see procedure report for details.  I instructed him to notify me if the intra-articular corticosteroid injections are not successful over the next 2 weeks and if this is the case I will order outpatient physical therapy.    This note was created using Dragon voice recognition software that occasionally misinterpreted phrases or words.    "

## 2020-08-26 NOTE — PROCEDURES
Large Joint Aspiration/Injection: R knee    Date/Time: 8/26/2020 2:15 PM  Performed by: Cholo Mena MD  Authorized by: Cholo Mena MD     Consent Done?:  Yes (Verbal)  Indications:  Pain  Site marked: the procedure site was marked    Timeout: prior to procedure the correct patient, procedure, and site was verified    Prep: patient was prepped and draped in usual sterile fashion      Local anesthesia used?: Yes    Local anesthetic:  Lidocaine 1% without epinephrine    Details:  Needle Size:  22 G  Ultrasonic Guidance for needle placement?: No    Approach:  Lateral  Location:  Knee  Site:  R knee  Medications:  40 mg methylPREDNISolone acetate 40 mg/mL; 40 mg methylPREDNISolone acetate 40 mg/mL  Patient tolerance:  Patient tolerated the procedure well with no immediate complications  Large Joint Aspiration/Injection: L knee    Date/Time: 8/26/2020 2:15 PM  Performed by: Cholo Mena MD  Authorized by: Cholo Mena MD     Consent Done?:  Yes (Verbal)  Indications:  Pain  Site marked: the procedure site was marked    Timeout: prior to procedure the correct patient, procedure, and site was verified    Prep: patient was prepped and draped in usual sterile fashion      Local anesthesia used?: Yes    Local anesthetic:  Lidocaine 1% without epinephrine    Details:  Needle Size:  22 G  Ultrasonic Guidance for needle placement?: No    Approach:  Lateral  Location:  Knee  Site:  L knee  Medications:  40 mg methylPREDNISolone acetate 40 mg/mL; 40 mg methylPREDNISolone acetate 40 mg/mL  Patient tolerance:  Patient tolerated the procedure well with no immediate complications

## 2020-08-26 NOTE — PROCEDURES
[facility]  Physical Medicine and Rehabilitation   [address] [place] [address], [address]  [place] [address]             Full Name: JULES CHAPMAN Gender: Male  Patient ID: [id number] YOB: 1970      Visit Date: 8/26/2020 11:21  Age: 49 Years 11 Months Old  Examining Physician: JADYN SULTANA DO      Sensory NCS      Nerve / Sites Rec. Site Onset Lat Peak Lat NP Amp Segments Distance Velocity     ms ms µV  cm m/s   R Median - Digit II (Antidromic)      Wrist Dig II 3.02 3.91 29.9 Wrist - Dig II 14 46   R Ulnar - Digit V (Antidromic)      Wrist Dig V 3.33 4.22 23.2 Wrist - Dig V 14 42   R Radial - Anatomical snuff box (Forearm)      Forearm Wrist 2.24 2.97 17.3 Forearm - Wrist 10 45   R Sural - Ankle (Calf)      Calf Ankle NR NR NR Calf - Ankle 14 NR   L Sural - Ankle (Calf)      Calf Ankle NR NR NR Calf - Ankle 14 NR       Motor NCS      Nerve / Sites Muscle Latency Amplitude Amp % Duration Segments Distance Lat Diff Velocity     ms mV % ms  cm ms m/s   R Median - APB      Wrist APB 4.06 12.9 100 6.56 Wrist - APB 8        Elbow APB 8.85 11.3 87.9 7.29 Elbow - Wrist 24 4.79 50   L Peroneal - EDB      Ankle EDB 5.83 1.4 100 3.96 Ankle - EDB 8        Fib head EDB 16.82 1.5 107 4.79 Fib head - Ankle 37 10.99 34   R Peroneal - EDB      Ankle EDB 5.94 0.7 100 6.88 Ankle - EDB 8        Fib head EDB 17.97 0.5 67.1 10.10 Fib head - Ankle 35 12.03 29   L Tibial - AH      Ankle AH 6.25 0.4 100 4.11 Ankle - AH 8        Pop fossa AH 18.13 0.3 74.6 6.30 Pop fossa - Ankle 40 11.88 34   R Tibial - AH      Ankle AH 5.99 1.8 100 5.36 Ankle - AH 8        Pop fossa AH 17.50 0.3 19.7 8.65 Pop fossa - Ankle 40 11.51 35       EMG Summary Table     Spontaneous MUAP Recruitment   Muscle IA Fib PSW Fasc Other Amp Dur. PPP Pattern   L. Vastus medialis N None None None . N N N N   L. Tibialis anterior N None 1+ None . N N N N   L. Peroneus longus 3+ None None None . N 2+ 2+ Reduced   L. Gastrocnemius (Medial head) 2+ 4+ 4+ None .  2- 2- N Reduced   L. Extensor hallucis longus 2+ 3+ 3+ None . N 2+ 2+ Reduced   L. Dorsal interossei (pedis) N 4+ 4+ None . 1- 1- N Discrete   R. Dorsal interossei (pedis) N 3+ 3+ None . 1- 1-  Discrete   L. Gluteus medius N None None None . N N N N   L. Gluteus mahsa N None None None . N N N N       Summary    The motor conduction test was performed on 8 nerve(s). The results were normal in 1 nerve(s): R Median - APB. Results outside the specified normal range were found in 6 nerve(s), as follows:   In the R Median - APB study  o the take off latency result was increased for Wrist stimulation  o the peak amplitude result was reduced for Wrist stimulation  o the take off velocity result was reduced for Elbow - Wrist segment   In the L Peroneal - EDB study  o the peak amplitude result was reduced for Ankle stimulation  o the take off velocity result was reduced for Fib head - Ankle segment   In the R Peroneal - EDB study  o the peak amplitude result was reduced for Ankle stimulation  o the take off velocity result was reduced for Fib head - Ankle segment   In the L Tibial - AH study  o the take off latency result was increased for Ankle stimulation  o the peak amplitude result was reduced for Ankle stimulation  o the take off velocity result was reduced for Pop fossa - Ankle segment   In the R Tibial - AH study  o the peak amplitude result was reduced for Ankle stimulation  o the take off velocity result was reduced for Pop fossa - Ankle segment    The sensory conduction test had results outside of the specified normal range in all 5 of the tested nerves:   In the R Median - Digit II (Antidromic) study  o the peak latency result was increased for Wrist stimulation   In the R Ulnar - Digit V (Antidromic) study  o the peak latency result was increased for Wrist stimulation   In theR Radial - Anatomical snuff box (Forearm) study  o the peak latency result was increased for Forearm stimulation   In the R Sural  - Ankle (Calf) study  o the response was considered absent for Calf stimulation   In the L Sural - Ankle (Calf) study  o the response was considered absent for Calf stimulation    The needle EMG examination was performed in 9 muscles. It was normal in 3 muscle(s): L. Vastus medialis, L. Gluteus medius, L. Gluteus mahsa. The study was abnormal in 6 muscle(s), with the following distribution:   Abnormal spontaneous/insertional activity was found in L. Tibialis anterior, L. Peroneus longus, L. Gastrocnemius (Medial head), L. Extensor hallucis longus, L. Dorsal interossei (pedis), R. Dorsal interossei (pedis).   The MUP waveform abnormality was found in L. Peroneus longus, L. Gastrocnemius (Medial head), L. Extensor hallucis longus, L. Dorsal interossei (pedis), R. Dorsal interossei (pedis).   Abnormal interference pattern was found in L. Peroneus longus, L. Gastrocnemius (Medial head), L. Extensor hallucis longus, L. Dorsal interossei (pedis), R. Dorsal interossei (pedis).          Impression:  Abnormal examination.  There is electrodiagnostic evidence of:  1. 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  There is evidence of sensory axonal polyneuropathy affecting the upper extremities.  2. There is EMG evidence of acute on chronic denervation of the left tibialis anterior, peroneus longus, medial gastroc, EHL, and dorsal interossei with normal EMG of the left gluteus medius and mahsa muscles.  These findings tend to favour severe length-dependent peripheral neuropathy over an acute on chronic left L5 and S1 radiculopathies.    Clinical history:  The chief complaint of numbness and tingling and weakness in the lower extremities is consistent with the above findings.  There is electrodiagnostic evidence of a severe sensory motor axonal peripheral polyneuropathy affecting the lower extremities and sensory polyneuropathy affecting the upper  extremities.  He does have a history of 3-4 alcoholic drink per night for many year history.  This is likely secondary to chronic alcohol use.  As stated in the impression, I suspect that the acute on chronic denervation findings in the lower extremities is most likely secondary to severity of peripheral neuropathy over an acute on chronic left L5 and S1 radiculopathies.  He will follow up with Dr. Mena as scheduled      ____________________________  JADYN SULTANA,

## 2020-09-09 ENCOUNTER — TELEPHONE (OUTPATIENT)
Dept: ORTHOPEDICS | Facility: CLINIC | Age: 50
End: 2020-09-09

## 2020-09-09 DIAGNOSIS — M25.562 CHRONIC PAIN OF BOTH KNEES: Primary | ICD-10-CM

## 2020-09-09 DIAGNOSIS — M25.561 CHRONIC PAIN OF BOTH KNEES: Primary | ICD-10-CM

## 2020-09-09 DIAGNOSIS — G89.29 CHRONIC PAIN OF BOTH KNEES: Primary | ICD-10-CM

## 2020-09-09 RX ORDER — TRAMADOL HYDROCHLORIDE 50 MG/1
50 TABLET ORAL EVERY 6 HOURS PRN
Qty: 28 TABLET | Refills: 0 | Status: SHIPPED | OUTPATIENT
Start: 2020-09-09 | End: 2020-09-16

## 2020-09-09 NOTE — TELEPHONE ENCOUNTER
Left a message for the patient to return my call, we will advise him to call his PCP to send pain management for medical management referral. We cannot conitue to prescribe pain meds we can give him 7 days of Tramadol.  As far as the injections, it was only 2 weeks ago he need to give it a little more time

## 2020-09-09 NOTE — TELEPHONE ENCOUNTER
----- Message from Hedy Porter sent at 9/9/2020 11:25 AM CDT -----  Phone #: [phone]  Patient is requesting a refill of Hydrocodone 5/325 Dr Mena        Pharmacy:   [facility] - [place], [place] - [address]  [address]  [place] [id number]  Phone: [phone] Fax: [phone]

## 2020-10-05 ENCOUNTER — OFFICE VISIT (OUTPATIENT)
Dept: ORTHOPEDICS | Facility: CLINIC | Age: 50
End: 2020-10-05
Payer: MEDICARE

## 2020-10-05 VITALS
WEIGHT: 170 LBS | DIASTOLIC BLOOD PRESSURE: 82 MMHG | SYSTOLIC BLOOD PRESSURE: 118 MMHG | HEART RATE: 96 BPM | BODY MASS INDEX: 20.7 KG/M2 | HEIGHT: 76 IN

## 2020-10-05 DIAGNOSIS — G89.29 CHRONIC PAIN OF BOTH KNEES: ICD-10-CM

## 2020-10-05 DIAGNOSIS — G60.9 IDIOPATHIC PERIPHERAL NEUROPATHY: ICD-10-CM

## 2020-10-05 DIAGNOSIS — Z98.1 HISTORY OF LUMBAR FUSION: Primary | ICD-10-CM

## 2020-10-05 DIAGNOSIS — M25.562 CHRONIC PAIN OF BOTH KNEES: ICD-10-CM

## 2020-10-05 DIAGNOSIS — M54.2 CERVICAL SPINE PAIN: ICD-10-CM

## 2020-10-05 DIAGNOSIS — R29.6 FREQUENT FALLS: ICD-10-CM

## 2020-10-05 DIAGNOSIS — M25.561 CHRONIC PAIN OF BOTH KNEES: ICD-10-CM

## 2020-10-05 DIAGNOSIS — M54.16 LUMBAR RADICULITIS: ICD-10-CM

## 2020-10-05 PROCEDURE — 99213 OFFICE O/P EST LOW 20 MIN: CPT | Mod: S$GLB,,, | Performed by: ORTHOPAEDIC SURGERY

## 2020-10-05 PROCEDURE — 99213 PR OFFICE/OUTPT VISIT, EST, LEVL III, 20-29 MIN: ICD-10-PCS | Mod: S$GLB,,, | Performed by: ORTHOPAEDIC SURGERY

## 2020-10-05 RX ORDER — DULOXETIN HYDROCHLORIDE 30 MG/1
30 CAPSULE, DELAYED RELEASE ORAL DAILY
Qty: 30 CAPSULE | Refills: 2 | Status: SHIPPED | OUTPATIENT
Start: 2020-10-05 | End: 2021-01-04

## 2020-10-05 RX ORDER — TRAMADOL HYDROCHLORIDE 50 MG/1
50 TABLET ORAL EVERY 6 HOURS
COMMUNITY
End: 2021-02-03

## 2020-10-05 RX ORDER — HYDROCODONE BITARTRATE AND ACETAMINOPHEN 5; 325 MG/1; MG/1
TABLET ORAL
Qty: 28 TABLET | Refills: 0 | Status: SHIPPED | OUTPATIENT
Start: 2020-10-05 | End: 2020-12-17

## 2020-10-05 NOTE — PROGRESS NOTES
Subjective:       Patient ID: Josh Coulter is a 50 y.o. male.    Chief Complaint: Pain of the Lumbar Spine (Lumbar pain x a while. State that his pain is still radiating down the legs, has numbness and tingling in the legs as well. Legs are weak and give out on him. ), Pain of the Left Knee (Left knee pain x a while. States that he had an injection 8.26.2020. and that the injection helped some.), and Pain of the Right Knee (Right knee pain x a while. States that he had an injection 8.26.2020. and that the injection helped some.)      History of Present Illness  Patient is here follow-up for multiple orthopedic issues.  Status post previous lumbar fusion at L4-5 March 2020.  Continues to complain of bilateral lower extremity pain and back pain.  Also continues to have episodes where he falls or loses his balance or trips.  His knee pain is better but is sensation of his lower extremities is altered.    Current Medications      Allergies  Review of patient's allergies indicates:   Allergen Reactions    Contrast media Other (See Comments)     syncopy       Past Medical History  Past Medical History:   Diagnosis Date    Hyponatremia     Syncope        Surgical History  Past Surgical History:   Procedure Laterality Date    ANTERIOR LUMBAR INTERBODY FUSION (ALIF) N/A 3/31/2020    Procedure: FUSION, SPINE, LUMBAR, ALIF L4-5;  Surgeon: Cholo Mena MD;  Location: [facility] OR;  Service: Orthopedics;  Laterality: N/A;    BONE GRAFT N/A 3/31/2020    Procedure: BONE GRAFT;  Surgeon: Cholo Mena MD;  Location: [facility] OR;  Service: Orthopedics;  Laterality: N/A;    FUSION OF SPINE WITH INSTRUMENTATION N/A 3/31/2020    Procedure: FUSION, SPINE, WITH INSTRUMENTATION L4-5;  Surgeon: Cholo Mena MD;  Location: [facility] OR;  Service: Orthopedics;  Laterality: N/A;  [employer], [employer], DR DAMON, CO-SURGEON    HERNIA REPAIR      SPINE SURGERY  2009       Family History:   Family History   Problem Relation Age of Onset     Hyperlipidemia Mother     Hypertension Mother     Hyperlipidemia Father     Hypertension Father        Social History:   Social History     Socioeconomic History    Marital status: Legally      Spouse name: Not on file    Number of children: 3    Years of education: Not on file    Highest education level: Not on file   Occupational History    Occupation: unemployed   Social Needs    Financial resource strain: Not on file    Food insecurity     Worry: Not on file     Inability: Not on file    Transportation needs     Medical: Not on file     Non-medical: Not on file   Tobacco Use    Smoking status: Current Every Day Smoker     Packs/day: 1.00     Years: 19.00     Pack years: 19.00     Types: Cigarettes    Smokeless tobacco: Never Used   Substance and Sexual Activity    Alcohol use: Yes     Alcohol/week: 14.0 standard drinks     Types: 14 Cans of beer per week     Comment: 2 beers per night    Drug use: Yes     Types: Marijuana    Sexual activity: Yes     Partners: Female   Lifestyle    Physical activity     Days per week: Not on file     Minutes per session: Not on file    Stress: Very much   Relationships    Social connections     Talks on phone: Not on file     Gets together: Not on file     Attends [employer] service: Not on file     Active member of club or organization: Not on file     Attends meetings of clubs or organizations: Not on file     Relationship status: Not on file   Other Topics Concern    Not on file   Social History Narrative    Not on file       Hospitalization/Major Diagnostic Procedure:     Review of Systems     General/Constitutional:  Chills denies. Fatigue denies. Fever denies. Weight gain denies. Weight loss denies.    Respiratory:  Shortness of breath denies.    Cardiovascular:  Chest pain denies.    Gastrointestinal:  Constipation denies. Diarrhea denies. Nausea denies. Vomiting denies.     Hematology:  Easy bruising denies. Prolonged bleeding denies.      Genitourinary:  Frequent urination denies. Pain in lower back denies. Painful urination denies.     Musculoskeletal:  See HPI for details    Skin:  Rash denies.    Neurologic:  Dizziness denies. Gait abnormalities denies. Seizures denies. Tingling/Numbess denies.    Psychiatric:  Anxiety denies. Depressed mood denies.     Objective:   Vital Signs:   Vitals:    10/05/20 1354   BP: 118/82   Pulse: 96        Physical Exam      General Examination:     Constitutional: The patient is alert and oriented to lace person and time. Mood is pleasant.     Head/Face: Normal facial features normal eyebrows    Eyes: Normal extraocular motion bilaterally    Lungs: Respirations are equal and unlabored    Gait is coordinated.    Cardiovascular: There are no swelling or varicosities present.    Lymphatic: Negative for adenopathy    Skin: Normal    Neurological: Level of consciousness normal. Oriented to place person and time and situation    Psychiatric: Oriented to time place person and situation    Bilateral knee exam:  Antalgic gait with a straight cane.  Both knees with mild effusion and positive ballottement.  Mild medial joint line tenderness palpation.  Full active range of motion and normal strength with no significant ligamentous instability.  No focal neurologic we would use    XRAY Report/ Interpretation:  Postop lumbar AP and lateral x-rays taken in the office today reviewed the patient demonstrate excellent placement of the L4-5 interbody cage and excellent placement of posterolateral instrumentation.  No new abnormalities noted.      Assessment:       1. History of lumbar fusion    2. Chronic pain of both knees    3. Idiopathic peripheral neuropathy    4. Frequent falls        Plan:       Josh was seen today for pain, pain and pain.    Diagnoses and all orders for this visit:    History of lumbar fusion  -     X-Ray Lumbar Spine Ap And Lateral    Chronic pain of both knees    Idiopathic peripheral  "neuropathy    Frequent falls         No follow-ups on file.  Pete Guzmán, physician's assistant served in the capacity as a "scribe" for this patient encounter  A "face to face" encounter occurred with Dr. Mena on this date  The treatment plan and medical decision making is outlined below:  Secondary to the fact that he is having these recurrent falls and he continues to have some significant lower extremity sensory deficit I recommend that he be evaluated by a neurologist.  He has and EMG nerve conduction study that demonstrates peripheral neuropathy.  He cannot tolerate the gabapentin nor can he tolerate the Lyrica.  Therefore we will attempt Cymbalta 30 mg q.d. refill of Norco 5/325.    For quite some time we have been discussing with him pain management for long-term pain medication management.  He has been told by his Medicaid insure that they will authorize 30 day supply of pain medication.  However they do not contract with any pain management specialist.  As surgical specialist we only write prescriptions for 7 days worth of controlled substances at anyone time.  This was the law in [place] and has become the protocol for [facility] and [facility].  Therefore he was given the contact number of [facility] in [place], [place].       This note was created using Dragon voice recognition software that occasionally misinterpreted phrases or words.    "

## 2021-01-06 ENCOUNTER — OFFICE VISIT (OUTPATIENT)
Dept: ORTHOPEDICS | Facility: CLINIC | Age: 51
End: 2021-01-06
Payer: MEDICARE

## 2021-01-06 DIAGNOSIS — G60.9 IDIOPATHIC PERIPHERAL NEUROPATHY: ICD-10-CM

## 2021-01-06 DIAGNOSIS — M47.812 ARTHROPATHY OF CERVICAL FACET JOINT: ICD-10-CM

## 2021-01-06 DIAGNOSIS — M54.2 CERVICAL SPINE PAIN: ICD-10-CM

## 2021-01-06 DIAGNOSIS — G89.29 CHRONIC BILATERAL LOW BACK PAIN WITHOUT SCIATICA: ICD-10-CM

## 2021-01-06 DIAGNOSIS — G89.29 NECK PAIN, CHRONIC: ICD-10-CM

## 2021-01-06 DIAGNOSIS — Z98.1 HISTORY OF LUMBAR FUSION: Primary | ICD-10-CM

## 2021-01-06 DIAGNOSIS — M25.562 CHRONIC PAIN OF BOTH KNEES: ICD-10-CM

## 2021-01-06 DIAGNOSIS — M25.561 CHRONIC PAIN OF BOTH KNEES: ICD-10-CM

## 2021-01-06 DIAGNOSIS — F51.01 PRIMARY INSOMNIA: ICD-10-CM

## 2021-01-06 DIAGNOSIS — M54.16 LUMBAR RADICULITIS: ICD-10-CM

## 2021-01-06 DIAGNOSIS — M50.30 DDD (DEGENERATIVE DISC DISEASE), CERVICAL: ICD-10-CM

## 2021-01-06 DIAGNOSIS — M54.2 CERVICAL PAIN (NECK): ICD-10-CM

## 2021-01-06 DIAGNOSIS — G89.29 CHRONIC PAIN OF BOTH KNEES: ICD-10-CM

## 2021-01-06 DIAGNOSIS — M54.2 NECK PAIN, CHRONIC: ICD-10-CM

## 2021-01-06 DIAGNOSIS — M54.50 CHRONIC BILATERAL LOW BACK PAIN WITHOUT SCIATICA: ICD-10-CM

## 2021-01-06 DIAGNOSIS — R29.6 FREQUENT FALLS: ICD-10-CM

## 2021-01-06 PROCEDURE — 99213 OFFICE O/P EST LOW 20 MIN: CPT | Mod: S$GLB,,, | Performed by: ORTHOPAEDIC SURGERY

## 2021-01-06 PROCEDURE — 99213 PR OFFICE/OUTPT VISIT, EST, LEVL III, 20-29 MIN: ICD-10-PCS | Mod: S$GLB,,, | Performed by: ORTHOPAEDIC SURGERY

## 2021-01-06 RX ORDER — HYDROCODONE BITARTRATE AND ACETAMINOPHEN 10; 325 MG/1; MG/1
1 TABLET ORAL EVERY 6 HOURS PRN
Qty: 28 TABLET | Refills: 0 | Status: SHIPPED | OUTPATIENT
Start: 2021-01-06 | End: 2021-01-27 | Stop reason: SDUPTHER

## 2021-01-06 RX ORDER — DICLOFENAC SODIUM 10 MG/G
2 GEL TOPICAL EVERY 6 HOURS PRN
Status: ON HOLD | COMMUNITY
Start: 2020-10-19 | End: 2022-02-24

## 2021-01-06 RX ORDER — TRAZODONE HYDROCHLORIDE 50 MG/1
100 TABLET ORAL NIGHTLY PRN
Qty: 60 TABLET | Refills: 2 | Status: SHIPPED | OUTPATIENT
Start: 2021-01-06 | End: 2021-03-26

## 2021-01-06 RX ORDER — NAPROXEN SODIUM 220 MG
220 TABLET ORAL
COMMUNITY
End: 2021-02-25

## 2021-01-06 RX ORDER — DULOXETIN HYDROCHLORIDE 60 MG/1
60 CAPSULE, DELAYED RELEASE ORAL DAILY
Qty: 30 CAPSULE | Refills: 5 | Status: SHIPPED | OUTPATIENT
Start: 2021-01-06 | End: 2021-03-22

## 2021-01-06 RX ORDER — LIDOCAINE 50 MG/G
1 PATCH TOPICAL
Status: ON HOLD | COMMUNITY
Start: 2020-10-19 | End: 2022-02-24

## 2021-01-12 ENCOUNTER — CLINICAL SUPPORT (OUTPATIENT)
Dept: REHABILITATION | Facility: HOSPITAL | Age: 51
End: 2021-01-12
Payer: MEDICARE

## 2021-01-12 DIAGNOSIS — R29.898 ARM WEAKNESS: ICD-10-CM

## 2021-01-12 DIAGNOSIS — M47.812 ARTHROPATHY OF CERVICAL FACET JOINT: ICD-10-CM

## 2021-01-12 DIAGNOSIS — M50.30 DDD (DEGENERATIVE DISC DISEASE), CERVICAL: ICD-10-CM

## 2021-01-12 DIAGNOSIS — M54.2 NECK PAIN: ICD-10-CM

## 2021-01-12 PROBLEM — R26.9 GAIT ABNORMALITY: Status: RESOLVED | Noted: 2019-12-20 | Resolved: 2021-01-12

## 2021-01-12 PROCEDURE — 97110 THERAPEUTIC EXERCISES: CPT | Mod: PN

## 2021-01-12 PROCEDURE — 97161 PT EVAL LOW COMPLEX 20 MIN: CPT | Mod: PN

## 2021-01-15 ENCOUNTER — TELEPHONE (OUTPATIENT)
Dept: ORTHOPEDICS | Facility: CLINIC | Age: 51
End: 2021-01-15

## 2021-01-15 ENCOUNTER — HOSPITAL ENCOUNTER (OUTPATIENT)
Dept: RADIOLOGY | Facility: HOSPITAL | Age: 51
Discharge: HOME | End: 2021-01-15
Attending: PHYSICIAN ASSISTANT
Payer: MEDICARE

## 2021-01-15 DIAGNOSIS — Z98.1 HISTORY OF LUMBAR FUSION: ICD-10-CM

## 2021-01-15 DIAGNOSIS — M54.16 LUMBAR RADICULITIS: ICD-10-CM

## 2021-01-15 PROCEDURE — 72131 CT LUMBAR SPINE W/O DYE: CPT | Mod: TC,PO

## 2021-01-18 ENCOUNTER — HOSPITAL ENCOUNTER (OUTPATIENT)
Dept: RADIOLOGY | Facility: HOSPITAL | Age: 51
Discharge: HOME | End: 2021-01-18
Attending: PHYSICIAN ASSISTANT
Payer: MEDICARE

## 2021-01-18 PROCEDURE — 72158 MRI LUMBAR SPINE W/O & W/DYE: CPT | Mod: TC

## 2021-01-18 PROCEDURE — 25500020 PHARM REV CODE 255: Performed by: PHYSICIAN ASSISTANT

## 2021-01-18 PROCEDURE — A9585 GADOBUTROL INJECTION: HCPCS | Performed by: PHYSICIAN ASSISTANT

## 2021-01-18 RX ORDER — GADOBUTROL 604.72 MG/ML
7.5 INJECTION INTRAVENOUS
Status: COMPLETED | OUTPATIENT
Start: 2021-01-18 | End: 2021-01-18

## 2021-01-18 RX ADMIN — GADOBUTROL 7.5 ML: 604.72 INJECTION INTRAVENOUS at 10:01

## 2021-01-21 ENCOUNTER — CLINICAL SUPPORT (OUTPATIENT)
Dept: REHABILITATION | Facility: HOSPITAL | Age: 51
End: 2021-01-21
Payer: MEDICARE

## 2021-01-21 DIAGNOSIS — M54.2 NECK PAIN: ICD-10-CM

## 2021-01-21 DIAGNOSIS — R29.898 ARM WEAKNESS: ICD-10-CM

## 2021-01-21 PROCEDURE — 97110 THERAPEUTIC EXERCISES: CPT | Mod: PN,CQ

## 2021-01-26 ENCOUNTER — CLINICAL SUPPORT (OUTPATIENT)
Dept: REHABILITATION | Facility: HOSPITAL | Age: 51
End: 2021-01-26
Payer: MEDICARE

## 2021-01-26 DIAGNOSIS — M54.2 NECK PAIN: ICD-10-CM

## 2021-01-26 DIAGNOSIS — R29.898 ARM WEAKNESS: ICD-10-CM

## 2021-01-26 PROCEDURE — 97110 THERAPEUTIC EXERCISES: CPT | Mod: PN,CQ

## 2021-01-27 ENCOUNTER — OFFICE VISIT (OUTPATIENT)
Dept: ORTHOPEDICS | Facility: CLINIC | Age: 51
End: 2021-01-27
Payer: MEDICARE

## 2021-01-27 VITALS
DIASTOLIC BLOOD PRESSURE: 81 MMHG | WEIGHT: 165 LBS | BODY MASS INDEX: 20.09 KG/M2 | HEIGHT: 76 IN | SYSTOLIC BLOOD PRESSURE: 123 MMHG | HEART RATE: 98 BPM

## 2021-01-27 DIAGNOSIS — M54.2 NECK PAIN, CHRONIC: ICD-10-CM

## 2021-01-27 DIAGNOSIS — G89.29 CHRONIC BILATERAL LOW BACK PAIN WITHOUT SCIATICA: ICD-10-CM

## 2021-01-27 DIAGNOSIS — G60.9 IDIOPATHIC PERIPHERAL NEUROPATHY: ICD-10-CM

## 2021-01-27 DIAGNOSIS — M54.16 LUMBAR RADICULITIS: ICD-10-CM

## 2021-01-27 DIAGNOSIS — G89.29 NECK PAIN, CHRONIC: ICD-10-CM

## 2021-01-27 DIAGNOSIS — T84.84XA PAINFUL ORTHOPAEDIC HARDWARE: ICD-10-CM

## 2021-01-27 DIAGNOSIS — M54.2 CERVICAL SPINE PAIN: ICD-10-CM

## 2021-01-27 DIAGNOSIS — M25.561 CHRONIC PAIN OF BOTH KNEES: ICD-10-CM

## 2021-01-27 DIAGNOSIS — Z98.1 HISTORY OF LUMBAR FUSION: Primary | ICD-10-CM

## 2021-01-27 DIAGNOSIS — M25.562 CHRONIC PAIN OF BOTH KNEES: ICD-10-CM

## 2021-01-27 DIAGNOSIS — Z98.1 HISTORY OF LUMBAR SPINAL FUSION: ICD-10-CM

## 2021-01-27 DIAGNOSIS — M47.812 ARTHROPATHY OF CERVICAL FACET JOINT: ICD-10-CM

## 2021-01-27 DIAGNOSIS — Z03.818 ENCOUNTER FOR OBSERVATION FOR SUSPECTED EXPOSURE TO OTHER BIOLOGICAL AGENTS RULED OUT: ICD-10-CM

## 2021-01-27 DIAGNOSIS — M50.30 DDD (DEGENERATIVE DISC DISEASE), CERVICAL: ICD-10-CM

## 2021-01-27 DIAGNOSIS — G89.29 CHRONIC PAIN OF BOTH KNEES: ICD-10-CM

## 2021-01-27 DIAGNOSIS — M54.2 CERVICAL PAIN (NECK): ICD-10-CM

## 2021-01-27 DIAGNOSIS — M54.50 CHRONIC BILATERAL LOW BACK PAIN WITHOUT SCIATICA: ICD-10-CM

## 2021-01-27 DIAGNOSIS — R29.6 FREQUENT FALLS: ICD-10-CM

## 2021-01-27 DIAGNOSIS — Z98.1 HISTORY OF LUMBAR FUSION: ICD-10-CM

## 2021-01-27 PROCEDURE — 99213 OFFICE O/P EST LOW 20 MIN: CPT | Mod: S$GLB,,, | Performed by: ORTHOPAEDIC SURGERY

## 2021-01-27 PROCEDURE — 99213 PR OFFICE/OUTPT VISIT, EST, LEVL III, 20-29 MIN: ICD-10-PCS | Mod: S$GLB,,, | Performed by: ORTHOPAEDIC SURGERY

## 2021-01-27 RX ORDER — PREDNISONE 20 MG/1
TABLET ORAL
COMMUNITY
Start: 2021-01-15 | End: 2021-02-03

## 2021-01-27 RX ORDER — HYDROCODONE BITARTRATE AND ACETAMINOPHEN 10; 325 MG/1; MG/1
1 TABLET ORAL EVERY 6 HOURS PRN
Qty: 28 TABLET | Refills: 0 | Status: SHIPPED | OUTPATIENT
Start: 2021-01-27 | End: 2021-02-22

## 2021-01-28 ENCOUNTER — CLINICAL SUPPORT (OUTPATIENT)
Dept: REHABILITATION | Facility: HOSPITAL | Age: 51
End: 2021-01-28
Payer: MEDICARE

## 2021-01-28 DIAGNOSIS — M50.30 DDD (DEGENERATIVE DISC DISEASE), CERVICAL: Primary | ICD-10-CM

## 2021-01-28 DIAGNOSIS — M54.2 NECK PAIN: ICD-10-CM

## 2021-01-28 DIAGNOSIS — R29.898 ARM WEAKNESS: ICD-10-CM

## 2021-01-28 DIAGNOSIS — M47.812 ARTHROPATHY OF CERVICAL FACET JOINT: ICD-10-CM

## 2021-01-28 PROCEDURE — 97110 THERAPEUTIC EXERCISES: CPT | Mod: PN

## 2021-02-03 ENCOUNTER — HOSPITAL ENCOUNTER (OUTPATIENT)
Dept: RADIOLOGY | Facility: HOSPITAL | Age: 51
Discharge: HOME | End: 2021-02-03
Attending: ORTHOPAEDIC SURGERY
Payer: MEDICARE

## 2021-02-03 ENCOUNTER — HOSPITAL ENCOUNTER (OUTPATIENT)
Dept: PREADMISSION TESTING | Facility: HOSPITAL | Age: 51
Discharge: HOME | End: 2021-02-03
Attending: ORTHOPAEDIC SURGERY
Payer: MEDICARE

## 2021-02-03 VITALS — BODY MASS INDEX: 20.09 KG/M2 | WEIGHT: 165 LBS | HEIGHT: 76 IN

## 2021-02-03 DIAGNOSIS — T84.84XA PAINFUL ORTHOPAEDIC HARDWARE: ICD-10-CM

## 2021-02-03 DIAGNOSIS — Z98.1 HISTORY OF LUMBAR FUSION: ICD-10-CM

## 2021-02-03 LAB
ALBUMIN SERPL BCP-MCNC: 2.4 G/DL (ref 3.5–5.2)
ALP SERPL-CCNC: 440 U/L (ref 55–135)
ALT SERPL W/O P-5'-P-CCNC: 70 U/L (ref 10–44)
ANION GAP SERPL CALC-SCNC: 9 MMOL/L (ref 8–16)
ANISOCYTOSIS BLD QL SMEAR: ABNORMAL
AST SERPL-CCNC: 138 U/L (ref 10–40)
BASOPHILS # BLD AUTO: 0.05 K/UL (ref 0–0.2)
BASOPHILS NFR BLD: 1 % (ref 0–1.9)
BILIRUB SERPL-MCNC: 5.4 MG/DL (ref 0.1–1)
BUN SERPL-MCNC: 4 MG/DL (ref 6–20)
CALCIUM SERPL-MCNC: 7.8 MG/DL (ref 8.7–10.5)
CHLORIDE SERPL-SCNC: 92 MMOL/L (ref 95–110)
CO2 SERPL-SCNC: 25 MMOL/L (ref 23–29)
CREAT SERPL-MCNC: 0.8 MG/DL (ref 0.5–1.4)
DIFFERENTIAL METHOD: ABNORMAL
EOSINOPHIL # BLD AUTO: 0 K/UL (ref 0–0.5)
EOSINOPHIL NFR BLD: 0.2 % (ref 0–8)
ERYTHROCYTE [DISTWIDTH] IN BLOOD BY AUTOMATED COUNT: 23.3 % (ref 11.5–14.5)
EST. GFR  (AFRICAN AMERICAN): >60 ML/MIN/1.73 M^2
EST. GFR  (NON AFRICAN AMERICAN): >60 ML/MIN/1.73 M^2
GLUCOSE SERPL-MCNC: 109 MG/DL (ref 70–110)
HCT VFR BLD AUTO: 26.9 % (ref 40–54)
HGB BLD-MCNC: 10.1 G/DL (ref 14–18)
HYPOCHROMIA BLD QL SMEAR: ABNORMAL
IMM GRANULOCYTES # BLD AUTO: 0.05 K/UL (ref 0–0.04)
IMM GRANULOCYTES NFR BLD AUTO: 1 % (ref 0–0.5)
LYMPHOCYTES # BLD AUTO: 1.2 K/UL (ref 1–4.8)
LYMPHOCYTES NFR BLD: 23.3 % (ref 18–48)
MCH RBC QN AUTO: 34 PG (ref 27–31)
MCHC RBC AUTO-ENTMCNC: 37.5 G/DL (ref 32–36)
MCV RBC AUTO: 91 FL (ref 82–98)
MONOCYTES # BLD AUTO: 0.4 K/UL (ref 0.3–1)
MONOCYTES NFR BLD: 7.8 % (ref 4–15)
NEUTROPHILS # BLD AUTO: 3.5 K/UL (ref 1.8–7.7)
NEUTROPHILS NFR BLD: 66.7 % (ref 38–73)
NRBC BLD-RTO: 0 /100 WBC
PAPPENHEIMER BOD BLD QL SMEAR: PRESENT
PLATELET # BLD AUTO: 146 K/UL (ref 150–350)
PLATELET BLD QL SMEAR: ABNORMAL
PMV BLD AUTO: 10.5 FL (ref 9.2–12.9)
POIKILOCYTOSIS BLD QL SMEAR: ABNORMAL
POTASSIUM SERPL-SCNC: 3.8 MMOL/L (ref 3.5–5.1)
PROT SERPL-MCNC: 5.6 G/DL (ref 6–8.4)
RBC # BLD AUTO: 2.97 M/UL (ref 4.6–6.2)
SODIUM SERPL-SCNC: 126 MMOL/L (ref 136–145)
TARGETS BLD QL SMEAR: ABNORMAL
WBC # BLD AUTO: 5.23 K/UL (ref 3.9–12.7)

## 2021-02-03 PROCEDURE — 93010 ELECTROCARDIOGRAM REPORT: CPT | Mod: ,,, | Performed by: INTERNAL MEDICINE

## 2021-02-03 PROCEDURE — 99900104 DSU ONLY-NO CHARGE-EA ADD'L HR (STAT)

## 2021-02-03 PROCEDURE — 71046 X-RAY EXAM CHEST 2 VIEWS: CPT | Mod: TC,FY

## 2021-02-03 PROCEDURE — 71046 X-RAY EXAM CHEST 2 VIEWS: CPT | Mod: 26,,, | Performed by: RADIOLOGY

## 2021-02-03 PROCEDURE — 85025 COMPLETE CBC W/AUTO DIFF WBC: CPT

## 2021-02-03 PROCEDURE — 36415 COLL VENOUS BLD VENIPUNCTURE: CPT

## 2021-02-03 PROCEDURE — 71046 XR CHEST PA AND LATERAL: ICD-10-PCS | Mod: 26,,, | Performed by: RADIOLOGY

## 2021-02-03 PROCEDURE — 99900103 DSU ONLY-NO CHARGE-INITIAL HR (STAT)

## 2021-02-03 PROCEDURE — 93005 ELECTROCARDIOGRAM TRACING: CPT

## 2021-02-03 PROCEDURE — 80053 COMPREHEN METABOLIC PANEL: CPT

## 2021-02-03 PROCEDURE — 93010 EKG 12-LEAD: ICD-10-PCS | Mod: ,,, | Performed by: INTERNAL MEDICINE

## 2021-02-08 ENCOUNTER — LAB VISIT (OUTPATIENT)
Dept: PRIMARY CARE CLINIC | Facility: CLINIC | Age: 51
End: 2021-02-08
Payer: MEDICARE

## 2021-02-08 DIAGNOSIS — Z03.818 ENCOUNTER FOR OBSERVATION FOR SUSPECTED EXPOSURE TO OTHER BIOLOGICAL AGENTS RULED OUT: ICD-10-CM

## 2021-02-08 PROCEDURE — U0005 INFEC AGEN DETEC AMPLI PROBE: HCPCS

## 2021-02-08 PROCEDURE — U0003 INFECTIOUS AGENT DETECTION BY NUCLEIC ACID (DNA OR RNA); SEVERE ACUTE RESPIRATORY SYNDROME CORONAVIRUS 2 (SARS-COV-2) (CORONAVIRUS DISEASE [COVID-19]), AMPLIFIED PROBE TECHNIQUE, MAKING USE OF HIGH THROUGHPUT TECHNOLOGIES AS DESCRIBED BY CMS-2020-01-R: HCPCS

## 2021-02-09 ENCOUNTER — TELEPHONE (OUTPATIENT)
Dept: REHABILITATION | Facility: HOSPITAL | Age: 51
End: 2021-02-09

## 2021-02-09 LAB — SARS-COV-2 RNA RESP QL NAA+PROBE: NOT DETECTED

## 2021-02-10 ENCOUNTER — ANESTHESIA EVENT (OUTPATIENT)
Dept: SURGERY | Facility: HOSPITAL | Age: 51
End: 2021-02-10
Payer: MEDICARE

## 2021-02-10 RX ORDER — SODIUM CHLORIDE, SODIUM LACTATE, POTASSIUM CHLORIDE, CALCIUM CHLORIDE 600; 310; 30; 20 MG/100ML; MG/100ML; MG/100ML; MG/100ML
INJECTION, SOLUTION INTRAVENOUS CONTINUOUS
Status: CANCELLED | OUTPATIENT
Start: 2021-02-10

## 2021-02-11 ENCOUNTER — ANESTHESIA (OUTPATIENT)
Dept: SURGERY | Facility: HOSPITAL | Age: 51
End: 2021-02-11
Payer: MEDICARE

## 2021-02-11 ENCOUNTER — HOSPITAL ENCOUNTER (OUTPATIENT)
Facility: HOSPITAL | Age: 51
Discharge: HOME | End: 2021-02-11
Attending: ORTHOPAEDIC SURGERY | Admitting: ORTHOPAEDIC SURGERY
Payer: MEDICARE

## 2021-02-11 ENCOUNTER — HOSPITAL ENCOUNTER (OUTPATIENT)
Dept: RADIOLOGY | Facility: HOSPITAL | Age: 51
Discharge: HOME | End: 2021-02-11
Attending: ORTHOPAEDIC SURGERY
Payer: MEDICARE

## 2021-02-11 VITALS
SYSTOLIC BLOOD PRESSURE: 121 MMHG | BODY MASS INDEX: 22.35 KG/M2 | HEART RATE: 100 BPM | DIASTOLIC BLOOD PRESSURE: 75 MMHG | OXYGEN SATURATION: 98 % | RESPIRATION RATE: 18 BRPM | HEIGHT: 72 IN | TEMPERATURE: 98 F | WEIGHT: 165 LBS

## 2021-02-11 DIAGNOSIS — R74.01 TRANSAMINITIS: Primary | ICD-10-CM

## 2021-02-11 DIAGNOSIS — T84.84XA PAINFUL ORTHOPAEDIC HARDWARE: ICD-10-CM

## 2021-02-11 DIAGNOSIS — Z98.1 HISTORY OF LUMBAR FUSION: ICD-10-CM

## 2021-02-11 DIAGNOSIS — M54.9 BACK PAIN: ICD-10-CM

## 2021-02-11 DIAGNOSIS — Z98.1 HISTORY OF LUMBAR SPINAL FUSION: ICD-10-CM

## 2021-02-11 LAB
ALBUMIN SERPL BCP-MCNC: 2.2 G/DL (ref 3.5–5.2)
ALP SERPL-CCNC: 406 U/L (ref 55–135)
ALT SERPL W/O P-5'-P-CCNC: 48 U/L (ref 10–44)
ANION GAP SERPL CALC-SCNC: 10 MMOL/L (ref 8–16)
AST SERPL-CCNC: 87 U/L (ref 10–40)
BASOPHILS # BLD AUTO: 0.02 K/UL (ref 0–0.2)
BASOPHILS NFR BLD: 0.4 % (ref 0–1.9)
BILIRUB SERPL-MCNC: 3.4 MG/DL (ref 0.1–1)
BUN SERPL-MCNC: 4 MG/DL (ref 6–20)
CALCIUM SERPL-MCNC: 7.6 MG/DL (ref 8.7–10.5)
CHLORIDE SERPL-SCNC: 89 MMOL/L (ref 95–110)
CO2 SERPL-SCNC: 24 MMOL/L (ref 23–29)
CREAT SERPL-MCNC: 0.8 MG/DL (ref 0.5–1.4)
DIFFERENTIAL METHOD: ABNORMAL
EOSINOPHIL # BLD AUTO: 0 K/UL (ref 0–0.5)
EOSINOPHIL NFR BLD: 0.4 % (ref 0–8)
ERYTHROCYTE [DISTWIDTH] IN BLOOD BY AUTOMATED COUNT: 20.4 % (ref 11.5–14.5)
EST. GFR  (AFRICAN AMERICAN): >60 ML/MIN/1.73 M^2
EST. GFR  (NON AFRICAN AMERICAN): >60 ML/MIN/1.73 M^2
GLUCOSE SERPL-MCNC: 112 MG/DL (ref 70–110)
HCT VFR BLD AUTO: 25.7 % (ref 40–54)
HGB BLD-MCNC: 9.1 G/DL (ref 14–18)
IMM GRANULOCYTES # BLD AUTO: 0.03 K/UL (ref 0–0.04)
IMM GRANULOCYTES NFR BLD AUTO: 0.6 % (ref 0–0.5)
INR PPP: 1.2 (ref 0.8–1.2)
LYMPHOCYTES # BLD AUTO: 0.6 K/UL (ref 1–4.8)
LYMPHOCYTES NFR BLD: 13.4 % (ref 18–48)
MCH RBC QN AUTO: 35.8 PG (ref 27–31)
MCHC RBC AUTO-ENTMCNC: 35.4 G/DL (ref 32–36)
MCV RBC AUTO: 101 FL (ref 82–98)
MONOCYTES # BLD AUTO: 0.3 K/UL (ref 0.3–1)
MONOCYTES NFR BLD: 6.4 % (ref 4–15)
NEUTROPHILS # BLD AUTO: 3.7 K/UL (ref 1.8–7.7)
NEUTROPHILS NFR BLD: 78.8 % (ref 38–73)
NRBC BLD-RTO: 0 /100 WBC
PLATELET # BLD AUTO: 158 K/UL (ref 150–350)
PMV BLD AUTO: 11 FL (ref 9.2–12.9)
POTASSIUM SERPL-SCNC: 4 MMOL/L (ref 3.5–5.1)
PROT SERPL-MCNC: 5.5 G/DL (ref 6–8.4)
PROTHROMBIN TIME: 12.4 SEC (ref 9–12.5)
RBC # BLD AUTO: 2.54 M/UL (ref 4.6–6.2)
SODIUM SERPL-SCNC: 123 MMOL/L (ref 136–145)
T4 FREE SERPL-MCNC: 0.8 NG/DL (ref 0.71–1.51)
TSH SERPL DL<=0.005 MIU/L-ACNC: 3.13 UIU/ML (ref 0.4–4)
WBC # BLD AUTO: 4.71 K/UL (ref 3.9–12.7)

## 2021-02-11 PROCEDURE — D9220A PRA ANESTHESIA: Mod: CRNA,,, | Performed by: NURSE ANESTHETIST, CERTIFIED REGISTERED

## 2021-02-11 PROCEDURE — 25000003 PHARM REV CODE 250: Performed by: NURSE ANESTHETIST, CERTIFIED REGISTERED

## 2021-02-11 PROCEDURE — 00670 ANES XTNSV SP&SPI CORD PX: CPT | Performed by: ORTHOPAEDIC SURGERY

## 2021-02-11 PROCEDURE — 76000 FLUOROSCOPY <1 HR PHYS/QHP: CPT | Mod: TC,59

## 2021-02-11 PROCEDURE — 25000003 PHARM REV CODE 250: Performed by: ANESTHESIOLOGY

## 2021-02-11 PROCEDURE — 80053 COMPREHEN METABOLIC PANEL: CPT

## 2021-02-11 PROCEDURE — 25000003 PHARM REV CODE 250: Performed by: ORTHOPAEDIC SURGERY

## 2021-02-11 PROCEDURE — 37000009 HC ANESTHESIA EA ADD 15 MINS: Performed by: ORTHOPAEDIC SURGERY

## 2021-02-11 PROCEDURE — 63600175 PHARM REV CODE 636 W HCPCS: Performed by: ORTHOPAEDIC SURGERY

## 2021-02-11 PROCEDURE — 85610 PROTHROMBIN TIME: CPT

## 2021-02-11 PROCEDURE — 84439 ASSAY OF FREE THYROXINE: CPT

## 2021-02-11 PROCEDURE — 84443 ASSAY THYROID STIM HORMONE: CPT

## 2021-02-11 PROCEDURE — 63600175 PHARM REV CODE 636 W HCPCS: Performed by: NURSE ANESTHETIST, CERTIFIED REGISTERED

## 2021-02-11 PROCEDURE — 37000008 HC ANESTHESIA 1ST 15 MINUTES: Performed by: ORTHOPAEDIC SURGERY

## 2021-02-11 PROCEDURE — 36000709 HC OR TIME LEV III EA ADD 15 MIN: Performed by: ORTHOPAEDIC SURGERY

## 2021-02-11 PROCEDURE — 71000033 HC RECOVERY, INTIAL HOUR: Performed by: ORTHOPAEDIC SURGERY

## 2021-02-11 PROCEDURE — D9220A PRA ANESTHESIA: ICD-10-PCS | Mod: CRNA,,, | Performed by: NURSE ANESTHETIST, CERTIFIED REGISTERED

## 2021-02-11 PROCEDURE — 36415 COLL VENOUS BLD VENIPUNCTURE: CPT

## 2021-02-11 PROCEDURE — 99900103 DSU ONLY-NO CHARGE-INITIAL HR (STAT): Performed by: ORTHOPAEDIC SURGERY

## 2021-02-11 PROCEDURE — 99900104 DSU ONLY-NO CHARGE-EA ADD'L HR (STAT): Performed by: ORTHOPAEDIC SURGERY

## 2021-02-11 PROCEDURE — 85025 COMPLETE CBC W/AUTO DIFF WBC: CPT

## 2021-02-11 PROCEDURE — 71000015 HC POSTOP RECOV 1ST HR: Performed by: ORTHOPAEDIC SURGERY

## 2021-02-11 PROCEDURE — 36000708 HC OR TIME LEV III 1ST 15 MIN: Performed by: ORTHOPAEDIC SURGERY

## 2021-02-11 PROCEDURE — D9220A PRA ANESTHESIA: Mod: ANES,,, | Performed by: ANESTHESIOLOGY

## 2021-02-11 PROCEDURE — 71000039 HC RECOVERY, EACH ADD'L HOUR: Performed by: ORTHOPAEDIC SURGERY

## 2021-02-11 PROCEDURE — D9220A PRA ANESTHESIA: ICD-10-PCS | Mod: ANES,,, | Performed by: ANESTHESIOLOGY

## 2021-02-11 RX ORDER — HYDROMORPHONE HYDROCHLORIDE 2 MG/ML
INJECTION, SOLUTION INTRAMUSCULAR; INTRAVENOUS; SUBCUTANEOUS
Status: DISCONTINUED | OUTPATIENT
Start: 2021-02-11 | End: 2021-02-11

## 2021-02-11 RX ORDER — CEFAZOLIN SODIUM 2 G/50ML
2 SOLUTION INTRAVENOUS
Status: COMPLETED | OUTPATIENT
Start: 2021-02-11 | End: 2021-02-11

## 2021-02-11 RX ORDER — OXYCODONE HYDROCHLORIDE 5 MG/1
5 TABLET ORAL EVERY 4 HOURS PRN
Status: CANCELLED | OUTPATIENT
Start: 2021-02-11

## 2021-02-11 RX ORDER — LIDOCAINE HYDROCHLORIDE 20 MG/ML
INJECTION INTRAVENOUS
Status: DISCONTINUED | OUTPATIENT
Start: 2021-02-11 | End: 2021-02-11

## 2021-02-11 RX ORDER — EPHEDRINE SULFATE 50 MG/ML
INJECTION, SOLUTION INTRAVENOUS
Status: DISCONTINUED | OUTPATIENT
Start: 2021-02-11 | End: 2021-02-11

## 2021-02-11 RX ORDER — AMOXICILLIN 250 MG
2 CAPSULE ORAL NIGHTLY PRN
Status: CANCELLED | OUTPATIENT
Start: 2021-02-11

## 2021-02-11 RX ORDER — FENTANYL CITRATE 50 UG/ML
INJECTION, SOLUTION INTRAMUSCULAR; INTRAVENOUS
Status: DISCONTINUED | OUTPATIENT
Start: 2021-02-11 | End: 2021-02-11

## 2021-02-11 RX ORDER — OXYCODONE AND ACETAMINOPHEN 10; 325 MG/1; MG/1
1 TABLET ORAL EVERY 4 HOURS PRN
Qty: 40 TABLET | Refills: 0 | Status: SHIPPED | OUTPATIENT
Start: 2021-02-11 | End: 2021-02-18

## 2021-02-11 RX ORDER — HYDROMORPHONE HYDROCHLORIDE 2 MG/ML
0.2 INJECTION, SOLUTION INTRAMUSCULAR; INTRAVENOUS; SUBCUTANEOUS EVERY 5 MIN PRN
Status: DISCONTINUED | OUTPATIENT
Start: 2021-02-11 | End: 2021-02-11

## 2021-02-11 RX ORDER — PROPOFOL 10 MG/ML
VIAL (ML) INTRAVENOUS
Status: DISCONTINUED | OUTPATIENT
Start: 2021-02-11 | End: 2021-02-11

## 2021-02-11 RX ORDER — DULOXETIN HYDROCHLORIDE 30 MG/1
60 CAPSULE, DELAYED RELEASE ORAL NIGHTLY
Status: CANCELLED | OUTPATIENT
Start: 2021-02-11

## 2021-02-11 RX ORDER — MUPIROCIN 20 MG/G
1 OINTMENT TOPICAL 2 TIMES DAILY
Status: CANCELLED | OUTPATIENT
Start: 2021-02-11 | End: 2021-02-16

## 2021-02-11 RX ORDER — MIDAZOLAM HYDROCHLORIDE 1 MG/ML
INJECTION, SOLUTION INTRAMUSCULAR; INTRAVENOUS
Status: DISCONTINUED | OUTPATIENT
Start: 2021-02-11 | End: 2021-02-11

## 2021-02-11 RX ORDER — SODIUM CHLORIDE, SODIUM LACTATE, POTASSIUM CHLORIDE, CALCIUM CHLORIDE 600; 310; 30; 20 MG/100ML; MG/100ML; MG/100ML; MG/100ML
125 INJECTION, SOLUTION INTRAVENOUS CONTINUOUS
Status: DISCONTINUED | OUTPATIENT
Start: 2021-02-11 | End: 2021-02-11

## 2021-02-11 RX ORDER — NAPROXEN 250 MG/1
500 TABLET ORAL 2 TIMES DAILY WITH MEALS
Status: CANCELLED | OUTPATIENT
Start: 2021-02-11

## 2021-02-11 RX ORDER — MAG HYDROX/ALUMINUM HYD/SIMETH 200-200-20
30 SUSPENSION, ORAL (FINAL DOSE FORM) ORAL EVERY 4 HOURS PRN
Status: CANCELLED | OUTPATIENT
Start: 2021-02-11

## 2021-02-11 RX ORDER — FOLIC ACID 1 MG/1
1 TABLET ORAL DAILY
Status: DISCONTINUED | OUTPATIENT
Start: 2021-02-11 | End: 2021-02-11

## 2021-02-11 RX ORDER — ACETAMINOPHEN 325 MG/1
650 TABLET ORAL EVERY 6 HOURS PRN
Status: CANCELLED | OUTPATIENT
Start: 2021-02-12

## 2021-02-11 RX ORDER — KETAMINE HYDROCHLORIDE 10 MG/ML
INJECTION, SOLUTION INTRAMUSCULAR; INTRAVENOUS
Status: DISCONTINUED | OUTPATIENT
Start: 2021-02-11 | End: 2021-02-11

## 2021-02-11 RX ORDER — DICLOFENAC SODIUM 10 MG/G
2 GEL TOPICAL 4 TIMES DAILY
Status: CANCELLED | OUTPATIENT
Start: 2021-02-11

## 2021-02-11 RX ORDER — DIPHENHYDRAMINE HCL 25 MG
50 CAPSULE ORAL EVERY 6 HOURS PRN
Status: CANCELLED | OUTPATIENT
Start: 2021-02-11

## 2021-02-11 RX ORDER — KETOROLAC TROMETHAMINE 30 MG/ML
INJECTION, SOLUTION INTRAMUSCULAR; INTRAVENOUS
Status: DISCONTINUED | OUTPATIENT
Start: 2021-02-11 | End: 2021-02-11

## 2021-02-11 RX ORDER — BISACODYL 10 MG
10 SUPPOSITORY, RECTAL RECTAL DAILY
Status: CANCELLED | OUTPATIENT
Start: 2021-02-11

## 2021-02-11 RX ORDER — DOCUSATE SODIUM 100 MG/1
100 CAPSULE, LIQUID FILLED ORAL DAILY
Status: CANCELLED | OUTPATIENT
Start: 2021-02-11

## 2021-02-11 RX ORDER — PHENYLEPHRINE HYDROCHLORIDE 10 MG/ML
INJECTION INTRAVENOUS
Status: DISCONTINUED | OUTPATIENT
Start: 2021-02-11 | End: 2021-02-11

## 2021-02-11 RX ORDER — DIAZEPAM 5 MG/1
10 TABLET ORAL EVERY 6 HOURS PRN
Status: DISCONTINUED | OUTPATIENT
Start: 2021-02-11 | End: 2021-02-11

## 2021-02-11 RX ORDER — DEXAMETHASONE SODIUM PHOSPHATE 4 MG/ML
INJECTION, SOLUTION INTRA-ARTICULAR; INTRALESIONAL; INTRAMUSCULAR; INTRAVENOUS; SOFT TISSUE
Status: DISCONTINUED | OUTPATIENT
Start: 2021-02-11 | End: 2021-02-11

## 2021-02-11 RX ORDER — LIDOCAINE HYDROCHLORIDE 10 MG/ML
1 INJECTION, SOLUTION EPIDURAL; INFILTRATION; INTRACAUDAL; PERINEURAL ONCE
Status: COMPLETED | OUTPATIENT
Start: 2021-02-11 | End: 2021-02-11

## 2021-02-11 RX ORDER — OXYCODONE HYDROCHLORIDE 10 MG/1
10 TABLET ORAL EVERY 4 HOURS PRN
Status: CANCELLED | OUTPATIENT
Start: 2021-02-11

## 2021-02-11 RX ORDER — CLONAZEPAM 0.5 MG/1
0.5 TABLET ORAL 2 TIMES DAILY PRN
Status: CANCELLED | OUTPATIENT
Start: 2021-02-11

## 2021-02-11 RX ORDER — AMITRIPTYLINE HYDROCHLORIDE 25 MG/1
25 TABLET, FILM COATED ORAL NIGHTLY PRN
Status: CANCELLED | OUTPATIENT
Start: 2021-02-11

## 2021-02-11 RX ORDER — ONDANSETRON 4 MG/1
8 TABLET, ORALLY DISINTEGRATING ORAL EVERY 6 HOURS PRN
Status: CANCELLED | OUTPATIENT
Start: 2021-02-11

## 2021-02-11 RX ORDER — ONDANSETRON 2 MG/ML
INJECTION INTRAMUSCULAR; INTRAVENOUS
Status: DISCONTINUED | OUTPATIENT
Start: 2021-02-11 | End: 2021-02-11

## 2021-02-11 RX ORDER — OXYCODONE HYDROCHLORIDE 5 MG/1
5 TABLET ORAL EVERY 6 HOURS PRN
Status: DISCONTINUED | OUTPATIENT
Start: 2021-02-11 | End: 2021-02-11

## 2021-02-11 RX ORDER — ROCURONIUM BROMIDE 10 MG/ML
INJECTION, SOLUTION INTRAVENOUS
Status: DISCONTINUED | OUTPATIENT
Start: 2021-02-11 | End: 2021-02-11

## 2021-02-11 RX ORDER — HYDROMORPHONE HYDROCHLORIDE 2 MG/ML
2 INJECTION, SOLUTION INTRAMUSCULAR; INTRAVENOUS; SUBCUTANEOUS
Status: CANCELLED | OUTPATIENT
Start: 2021-02-11

## 2021-02-11 RX ORDER — THIAMINE HCL 100 MG
100 TABLET ORAL DAILY
Status: DISCONTINUED | OUTPATIENT
Start: 2021-02-11 | End: 2021-02-11

## 2021-02-11 RX ADMIN — PHENYLEPHRINE HYDROCHLORIDE 100 MCG: 10 INJECTION INTRAVENOUS at 07:02

## 2021-02-11 RX ADMIN — DEXAMETHASONE SODIUM PHOSPHATE 8 MG: 4 INJECTION, SOLUTION INTRA-ARTICULAR; INTRALESIONAL; INTRAMUSCULAR; INTRAVENOUS; SOFT TISSUE at 07:02

## 2021-02-11 RX ADMIN — ROCURONIUM BROMIDE 30 MG: 10 INJECTION, SOLUTION INTRAVENOUS at 07:02

## 2021-02-11 RX ADMIN — LIDOCAINE HYDROCHLORIDE 100 MG: 20 INJECTION, SOLUTION INTRAVENOUS at 07:02

## 2021-02-11 RX ADMIN — PROPOFOL 160 MG: 10 INJECTION, EMULSION INTRAVENOUS at 07:02

## 2021-02-11 RX ADMIN — PHENYLEPHRINE HYDROCHLORIDE 200 MCG: 10 INJECTION INTRAVENOUS at 07:02

## 2021-02-11 RX ADMIN — HYDROMORPHONE HYDROCHLORIDE 0.2 MG: 2 INJECTION, SOLUTION INTRAMUSCULAR; INTRAVENOUS; SUBCUTANEOUS at 08:02

## 2021-02-11 RX ADMIN — FENTANYL CITRATE 100 MCG: 50 INJECTION, SOLUTION INTRAMUSCULAR; INTRAVENOUS at 07:02

## 2021-02-11 RX ADMIN — KETAMINE HYDROCHLORIDE 20 MG: 10 INJECTION, SOLUTION INTRAMUSCULAR; INTRAVENOUS at 07:02

## 2021-02-11 RX ADMIN — HYDROMORPHONE HYDROCHLORIDE 0.2 MG: 2 INJECTION, SOLUTION INTRAMUSCULAR; INTRAVENOUS; SUBCUTANEOUS at 07:02

## 2021-02-11 RX ADMIN — KETOROLAC TROMETHAMINE 30 MG: 30 INJECTION, SOLUTION INTRAMUSCULAR; INTRAVENOUS at 07:02

## 2021-02-11 RX ADMIN — OXYCODONE HYDROCHLORIDE 5 MG: 5 TABLET ORAL at 11:02

## 2021-02-11 RX ADMIN — SUGAMMADEX 150 MG: 100 INJECTION, SOLUTION INTRAVENOUS at 07:02

## 2021-02-11 RX ADMIN — CEFAZOLIN SODIUM 2 G: 2 SOLUTION INTRAVENOUS at 07:02

## 2021-02-11 RX ADMIN — LIDOCAINE HYDROCHLORIDE 10 MG: 10 INJECTION, SOLUTION EPIDURAL; INFILTRATION; INTRACAUDAL; PERINEURAL at 06:02

## 2021-02-11 RX ADMIN — SODIUM CHLORIDE, SODIUM GLUCONATE, SODIUM ACETATE, POTASSIUM CHLORIDE, MAGNESIUM CHLORIDE, SODIUM PHOSPHATE, DIBASIC, AND POTASSIUM PHOSPHATE: .53; .5; .37; .037; .03; .012; .00082 INJECTION, SOLUTION INTRAVENOUS at 06:02

## 2021-02-11 RX ADMIN — ROCURONIUM BROMIDE 5 MG: 10 INJECTION, SOLUTION INTRAVENOUS at 07:02

## 2021-02-11 RX ADMIN — EPHEDRINE SULFATE 15 MG: 50 INJECTION, SOLUTION INTRAMUSCULAR; INTRAVENOUS; SUBCUTANEOUS at 07:02

## 2021-02-11 RX ADMIN — ONDANSETRON 4 MG: 2 INJECTION, SOLUTION INTRAMUSCULAR; INTRAVENOUS at 07:02

## 2021-02-11 RX ADMIN — KETAMINE HYDROCHLORIDE 10 MG: 10 INJECTION, SOLUTION INTRAMUSCULAR; INTRAVENOUS at 07:02

## 2021-02-11 RX ADMIN — MIDAZOLAM 2 MG: 1 INJECTION INTRAMUSCULAR; INTRAVENOUS at 06:02

## 2021-02-17 ENCOUNTER — TELEPHONE (OUTPATIENT)
Dept: TRANSPLANT | Facility: CLINIC | Age: 51
End: 2021-02-17

## 2021-02-17 ENCOUNTER — TELEPHONE (OUTPATIENT)
Dept: HEPATOLOGY | Facility: CLINIC | Age: 51
End: 2021-02-17

## 2021-02-18 ENCOUNTER — TELEPHONE (OUTPATIENT)
Dept: FAMILY MEDICINE | Facility: CLINIC | Age: 51
End: 2021-02-18

## 2021-02-19 DIAGNOSIS — Z98.1 HISTORY OF LUMBAR FUSION: Primary | ICD-10-CM

## 2021-02-19 RX ORDER — OXYCODONE AND ACETAMINOPHEN 10; 325 MG/1; MG/1
1 TABLET ORAL EVERY 6 HOURS PRN
Qty: 28 TABLET | Refills: 0 | Status: SHIPPED | OUTPATIENT
Start: 2021-02-19 | End: 2021-02-24

## 2021-02-22 ENCOUNTER — OFFICE VISIT (OUTPATIENT)
Dept: FAMILY MEDICINE | Facility: CLINIC | Age: 51
End: 2021-02-22
Payer: MEDICARE

## 2021-02-22 VITALS
WEIGHT: 175.19 LBS | TEMPERATURE: 97 F | BODY MASS INDEX: 23.73 KG/M2 | HEIGHT: 72 IN | OXYGEN SATURATION: 100 % | DIASTOLIC BLOOD PRESSURE: 98 MMHG | HEART RATE: 72 BPM | RESPIRATION RATE: 20 BRPM | SYSTOLIC BLOOD PRESSURE: 138 MMHG

## 2021-02-22 DIAGNOSIS — Z13.220 ENCOUNTER FOR LIPID SCREENING FOR CARDIOVASCULAR DISEASE: ICD-10-CM

## 2021-02-22 DIAGNOSIS — D64.9 ANEMIA, UNSPECIFIED TYPE: ICD-10-CM

## 2021-02-22 DIAGNOSIS — Z13.6 ENCOUNTER FOR LIPID SCREENING FOR CARDIOVASCULAR DISEASE: ICD-10-CM

## 2021-02-22 DIAGNOSIS — Z11.4 SCREENING FOR HIV WITHOUT PRESENCE OF RISK FACTORS: ICD-10-CM

## 2021-02-22 DIAGNOSIS — R17 UNSPECIFIED JAUNDICE: Primary | ICD-10-CM

## 2021-02-22 DIAGNOSIS — R74.8 ELEVATED ALKALINE PHOSPHATASE LEVEL: ICD-10-CM

## 2021-02-22 DIAGNOSIS — E87.1 HYPONATREMIA: ICD-10-CM

## 2021-02-22 DIAGNOSIS — R79.89 ELEVATED LIVER FUNCTION TESTS: ICD-10-CM

## 2021-02-22 PROCEDURE — 99215 OFFICE O/P EST HI 40 MIN: CPT | Performed by: NURSE PRACTITIONER

## 2021-02-22 PROCEDURE — 99214 PR OFFICE/OUTPT VISIT, EST, LEVL IV, 30-39 MIN: ICD-10-PCS | Mod: S$PBB,,, | Performed by: NURSE PRACTITIONER

## 2021-02-22 PROCEDURE — 99214 OFFICE O/P EST MOD 30 MIN: CPT | Mod: S$PBB,,, | Performed by: NURSE PRACTITIONER

## 2021-02-23 ENCOUNTER — TELEPHONE (OUTPATIENT)
Dept: ORTHOPEDICS | Facility: CLINIC | Age: 51
End: 2021-02-23

## 2021-02-23 ENCOUNTER — LAB VISIT (OUTPATIENT)
Dept: LAB | Facility: HOSPITAL | Age: 51
End: 2021-02-23
Attending: NURSE PRACTITIONER
Payer: MEDICARE

## 2021-02-23 DIAGNOSIS — R79.89 ELEVATED LIVER FUNCTION TESTS: ICD-10-CM

## 2021-02-23 DIAGNOSIS — Z09 HOSPITAL DISCHARGE FOLLOW-UP: ICD-10-CM

## 2021-02-23 DIAGNOSIS — Z11.4 SCREENING FOR HIV WITHOUT PRESENCE OF RISK FACTORS: ICD-10-CM

## 2021-02-23 DIAGNOSIS — E87.1 HYPONATREMIA: ICD-10-CM

## 2021-02-23 DIAGNOSIS — D64.9 ANEMIA, UNSPECIFIED TYPE: ICD-10-CM

## 2021-02-23 DIAGNOSIS — Z13.6 ENCOUNTER FOR LIPID SCREENING FOR CARDIOVASCULAR DISEASE: ICD-10-CM

## 2021-02-23 DIAGNOSIS — R74.8 ELEVATED ALKALINE PHOSPHATASE LEVEL: ICD-10-CM

## 2021-02-23 DIAGNOSIS — F10.11 HISTORY OF ALCOHOL ABUSE: ICD-10-CM

## 2021-02-23 DIAGNOSIS — Z13.220 ENCOUNTER FOR LIPID SCREENING FOR CARDIOVASCULAR DISEASE: ICD-10-CM

## 2021-02-23 LAB
ALBUMIN SERPL BCP-MCNC: 2.3 G/DL (ref 3.5–5.2)
ALBUMIN SERPL BCP-MCNC: 2.3 G/DL (ref 3.5–5.2)
ALP SERPL-CCNC: 219 U/L (ref 55–135)
ALT SERPL W/O P-5'-P-CCNC: 20 U/L (ref 10–44)
ALT SERPL W/O P-5'-P-CCNC: 20 U/L (ref 10–44)
AMYLASE SERPL-CCNC: 70 U/L (ref 20–110)
ANION GAP SERPL CALC-SCNC: 8 MMOL/L (ref 8–16)
ANION GAP SERPL CALC-SCNC: 8 MMOL/L (ref 8–16)
AST SERPL-CCNC: 31 U/L (ref 10–40)
AST SERPL-CCNC: 31 U/L (ref 10–40)
BASOPHILS # BLD AUTO: 0.03 K/UL (ref 0–0.2)
BASOPHILS NFR BLD: 0.6 % (ref 0–1.9)
BILIRUB DIRECT SERPL-MCNC: 1.1 MG/DL (ref 0.1–0.3)
BILIRUB SERPL-MCNC: 1.5 MG/DL (ref 0.1–1)
BILIRUB SERPL-MCNC: 1.5 MG/DL (ref 0.1–1)
BUN SERPL-MCNC: 2 MG/DL (ref 6–20)
BUN SERPL-MCNC: 2 MG/DL (ref 6–20)
CALCIUM SERPL-MCNC: 8.3 MG/DL (ref 8.7–10.5)
CALCIUM SERPL-MCNC: 8.3 MG/DL (ref 8.7–10.5)
CHLORIDE SERPL-SCNC: 94 MMOL/L (ref 95–110)
CHLORIDE SERPL-SCNC: 94 MMOL/L (ref 95–110)
CHOLEST SERPL-MCNC: 135 MG/DL (ref 120–199)
CHOLEST/HDLC SERPL: 2.7 {RATIO} (ref 2–5)
CO2 SERPL-SCNC: 28 MMOL/L (ref 23–29)
CO2 SERPL-SCNC: 28 MMOL/L (ref 23–29)
CREAT SERPL-MCNC: 0.7 MG/DL (ref 0.5–1.4)
CREAT SERPL-MCNC: 0.7 MG/DL (ref 0.5–1.4)
DIFFERENTIAL METHOD: ABNORMAL
EOSINOPHIL # BLD AUTO: 0 K/UL (ref 0–0.5)
EOSINOPHIL NFR BLD: 0.9 % (ref 0–8)
ERYTHROCYTE [DISTWIDTH] IN BLOOD BY AUTOMATED COUNT: 14.7 % (ref 11.5–14.5)
EST. GFR  (AFRICAN AMERICAN): >60 ML/MIN/1.73 M^2
EST. GFR  (AFRICAN AMERICAN): >60 ML/MIN/1.73 M^2
EST. GFR  (NON AFRICAN AMERICAN): >60 ML/MIN/1.73 M^2
EST. GFR  (NON AFRICAN AMERICAN): >60 ML/MIN/1.73 M^2
FERRITIN SERPL-MCNC: 1721 NG/ML (ref 20–300)
GGT SERPL-CCNC: 344 U/L (ref 8–55)
GLUCOSE SERPL-MCNC: 90 MG/DL (ref 70–110)
GLUCOSE SERPL-MCNC: 90 MG/DL (ref 70–110)
HCT VFR BLD AUTO: 29.1 % (ref 40–54)
HDLC SERPL-MCNC: 50 MG/DL (ref 40–75)
HDLC SERPL: 37 % (ref 20–50)
HGB BLD-MCNC: 9.6 G/DL (ref 14–18)
IMM GRANULOCYTES # BLD AUTO: 0.01 K/UL (ref 0–0.04)
IMM GRANULOCYTES NFR BLD AUTO: 0.2 % (ref 0–0.5)
LDLC SERPL CALC-MCNC: 71 MG/DL (ref 63–159)
LIPASE SERPL-CCNC: 16 U/L (ref 4–60)
LYMPHOCYTES # BLD AUTO: 1.2 K/UL (ref 1–4.8)
LYMPHOCYTES NFR BLD: 26.3 % (ref 18–48)
MCH RBC QN AUTO: 36 PG (ref 27–31)
MCHC RBC AUTO-ENTMCNC: 33 G/DL (ref 32–36)
MCV RBC AUTO: 109 FL (ref 82–98)
MONOCYTES # BLD AUTO: 0.5 K/UL (ref 0.3–1)
MONOCYTES NFR BLD: 11.3 % (ref 4–15)
NEUTROPHILS # BLD AUTO: 2.8 K/UL (ref 1.8–7.7)
NEUTROPHILS NFR BLD: 60.7 % (ref 38–73)
NONHDLC SERPL-MCNC: 85 MG/DL
NRBC BLD-RTO: 0 /100 WBC
PLATELET # BLD AUTO: 252 K/UL (ref 150–350)
PMV BLD AUTO: 9 FL (ref 9.2–12.9)
POTASSIUM SERPL-SCNC: 3.8 MMOL/L (ref 3.5–5.1)
POTASSIUM SERPL-SCNC: 3.8 MMOL/L (ref 3.5–5.1)
PROT SERPL-MCNC: 5.6 G/DL (ref 6–8.4)
PROT SERPL-MCNC: 5.6 G/DL (ref 6–8.4)
RBC # BLD AUTO: 2.67 M/UL (ref 4.6–6.2)
RETICS/RBC NFR AUTO: 3.6 % (ref 0.4–2)
SODIUM SERPL-SCNC: 130 MMOL/L (ref 136–145)
SODIUM SERPL-SCNC: 130 MMOL/L (ref 136–145)
TRIGL SERPL-MCNC: 70 MG/DL (ref 30–150)
WBC # BLD AUTO: 4.68 K/UL (ref 3.9–12.7)

## 2021-02-23 PROCEDURE — 83010 ASSAY OF HAPTOGLOBIN QUANT: CPT

## 2021-02-23 PROCEDURE — 80053 COMPREHEN METABOLIC PANEL: CPT

## 2021-02-23 PROCEDURE — 85025 COMPLETE CBC W/AUTO DIFF WBC: CPT

## 2021-02-23 PROCEDURE — 80074 ACUTE HEPATITIS PANEL: CPT

## 2021-02-23 PROCEDURE — 86703 HIV-1/HIV-2 1 RESULT ANTBDY: CPT

## 2021-02-23 PROCEDURE — 82306 VITAMIN D 25 HYDROXY: CPT

## 2021-02-23 PROCEDURE — 83540 ASSAY OF IRON: CPT

## 2021-02-23 PROCEDURE — 82150 ASSAY OF AMYLASE: CPT

## 2021-02-23 PROCEDURE — 80061 LIPID PANEL: CPT

## 2021-02-23 PROCEDURE — 85045 AUTOMATED RETICULOCYTE COUNT: CPT

## 2021-02-23 PROCEDURE — 83690 ASSAY OF LIPASE: CPT

## 2021-02-23 PROCEDURE — 82977 ASSAY OF GGT: CPT

## 2021-02-23 PROCEDURE — 36415 COLL VENOUS BLD VENIPUNCTURE: CPT

## 2021-02-23 PROCEDURE — 82728 ASSAY OF FERRITIN: CPT

## 2021-02-24 ENCOUNTER — OFFICE VISIT (OUTPATIENT)
Dept: ORTHOPEDICS | Facility: CLINIC | Age: 51
End: 2021-02-24
Payer: MEDICAID

## 2021-02-24 VITALS
HEART RATE: 109 BPM | BODY MASS INDEX: 21.02 KG/M2 | WEIGHT: 155 LBS | DIASTOLIC BLOOD PRESSURE: 87 MMHG | SYSTOLIC BLOOD PRESSURE: 133 MMHG

## 2021-02-24 DIAGNOSIS — Z98.890 S/P HARDWARE REMOVAL: ICD-10-CM

## 2021-02-24 DIAGNOSIS — Z98.1 HISTORY OF LUMBAR FUSION: Primary | ICD-10-CM

## 2021-02-24 LAB
25(OH)D3+25(OH)D2 SERPL-MCNC: 11 NG/ML (ref 30–96)
HAPTOGLOB SERPL-MCNC: 51 MG/DL (ref 30–250)
HAV IGM SERPL QL IA: NEGATIVE
HBV CORE IGM SERPL QL IA: NEGATIVE
HBV SURFACE AG SERPL QL IA: NEGATIVE
HCV AB SERPL QL IA: NEGATIVE
HIV 1+2 AB+HIV1 P24 AG SERPL QL IA: NEGATIVE
IRON SERPL-MCNC: 45 UG/DL (ref 45–160)
SATURATED IRON: 18 % (ref 20–50)
TOTAL IRON BINDING CAPACITY: 244 UG/DL (ref 250–450)
TRANSFERRIN SERPL-MCNC: 165 MG/DL (ref 200–375)

## 2021-02-24 PROCEDURE — 99024 PR POST-OP FOLLOW-UP VISIT: ICD-10-PCS | Mod: S$GLB,,, | Performed by: ORTHOPAEDIC SURGERY

## 2021-02-24 PROCEDURE — 99024 POSTOP FOLLOW-UP VISIT: CPT | Mod: S$GLB,,, | Performed by: ORTHOPAEDIC SURGERY

## 2021-02-24 RX ORDER — OXYCODONE HYDROCHLORIDE 5 MG/1
10 TABLET ORAL EVERY 6 HOURS PRN
Qty: 56 TABLET | Refills: 0 | Status: SHIPPED | OUTPATIENT
Start: 2021-02-24 | End: 2021-03-03

## 2021-02-25 ENCOUNTER — HOSPITAL ENCOUNTER (EMERGENCY)
Facility: HOSPITAL | Age: 51
Discharge: HOME | End: 2021-02-25
Attending: EMERGENCY MEDICINE
Payer: MEDICAID

## 2021-02-25 VITALS
HEART RATE: 105 BPM | TEMPERATURE: 98 F | OXYGEN SATURATION: 100 % | WEIGHT: 165 LBS | HEIGHT: 76 IN | BODY MASS INDEX: 20.09 KG/M2 | SYSTOLIC BLOOD PRESSURE: 154 MMHG | DIASTOLIC BLOOD PRESSURE: 74 MMHG | RESPIRATION RATE: 16 BRPM

## 2021-02-25 DIAGNOSIS — I50.810 RIGHT-SIDED HEART FAILURE, UNSPECIFIED HF CHRONICITY: Primary | ICD-10-CM

## 2021-02-25 DIAGNOSIS — M79.89 LEG SWELLING: ICD-10-CM

## 2021-02-25 LAB
ALBUMIN SERPL BCP-MCNC: 2.6 G/DL (ref 3.5–5.2)
ALP SERPL-CCNC: 196 U/L (ref 55–135)
ALT SERPL W/O P-5'-P-CCNC: 18 U/L (ref 10–44)
ANION GAP SERPL CALC-SCNC: 8 MMOL/L (ref 8–16)
AST SERPL-CCNC: 31 U/L (ref 10–40)
BASOPHILS # BLD AUTO: 0.03 K/UL (ref 0–0.2)
BASOPHILS NFR BLD: 0.5 % (ref 0–1.9)
BILIRUB SERPL-MCNC: 1.4 MG/DL (ref 0.1–1)
BNP SERPL-MCNC: 269 PG/ML (ref 0–99)
BUN SERPL-MCNC: 2 MG/DL (ref 6–20)
CALCIUM SERPL-MCNC: 8.4 MG/DL (ref 8.7–10.5)
CHLORIDE SERPL-SCNC: 95 MMOL/L (ref 95–110)
CO2 SERPL-SCNC: 28 MMOL/L (ref 23–29)
CREAT SERPL-MCNC: 0.7 MG/DL (ref 0.5–1.4)
DIFFERENTIAL METHOD: ABNORMAL
EOSINOPHIL # BLD AUTO: 0 K/UL (ref 0–0.5)
EOSINOPHIL NFR BLD: 0.5 % (ref 0–8)
ERYTHROCYTE [DISTWIDTH] IN BLOOD BY AUTOMATED COUNT: 14.5 % (ref 11.5–14.5)
EST. GFR  (AFRICAN AMERICAN): >60 ML/MIN/1.73 M^2
EST. GFR  (NON AFRICAN AMERICAN): >60 ML/MIN/1.73 M^2
GLUCOSE SERPL-MCNC: 84 MG/DL (ref 70–110)
HCT VFR BLD AUTO: 30.6 % (ref 40–54)
HGB BLD-MCNC: 10.1 G/DL (ref 14–18)
IMM GRANULOCYTES # BLD AUTO: 0.02 K/UL (ref 0–0.04)
IMM GRANULOCYTES NFR BLD AUTO: 0.3 % (ref 0–0.5)
LYMPHOCYTES # BLD AUTO: 1.4 K/UL (ref 1–4.8)
LYMPHOCYTES NFR BLD: 22.3 % (ref 18–48)
MCH RBC QN AUTO: 35.9 PG (ref 27–31)
MCHC RBC AUTO-ENTMCNC: 33 G/DL (ref 32–36)
MCV RBC AUTO: 109 FL (ref 82–98)
MONOCYTES # BLD AUTO: 0.6 K/UL (ref 0.3–1)
MONOCYTES NFR BLD: 9.1 % (ref 4–15)
NEUTROPHILS # BLD AUTO: 4.2 K/UL (ref 1.8–7.7)
NEUTROPHILS NFR BLD: 67.3 % (ref 38–73)
NRBC BLD-RTO: 0 /100 WBC
PLATELET # BLD AUTO: 293 K/UL (ref 150–350)
PMV BLD AUTO: 8.6 FL (ref 9.2–12.9)
POTASSIUM SERPL-SCNC: 3.7 MMOL/L (ref 3.5–5.1)
PROT SERPL-MCNC: 6.1 G/DL (ref 6–8.4)
RBC # BLD AUTO: 2.81 M/UL (ref 4.6–6.2)
SODIUM SERPL-SCNC: 131 MMOL/L (ref 136–145)
WBC # BLD AUTO: 6.24 K/UL (ref 3.9–12.7)

## 2021-02-25 PROCEDURE — 83880 ASSAY OF NATRIURETIC PEPTIDE: CPT

## 2021-02-25 PROCEDURE — 85025 COMPLETE CBC W/AUTO DIFF WBC: CPT

## 2021-02-25 PROCEDURE — 36415 COLL VENOUS BLD VENIPUNCTURE: CPT

## 2021-02-25 PROCEDURE — 25000003 PHARM REV CODE 250: Performed by: EMERGENCY MEDICINE

## 2021-02-25 PROCEDURE — 93005 ELECTROCARDIOGRAM TRACING: CPT

## 2021-02-25 PROCEDURE — 80053 COMPREHEN METABOLIC PANEL: CPT

## 2021-02-25 PROCEDURE — 93010 EKG 12-LEAD: ICD-10-PCS | Mod: ,,, | Performed by: SPECIALIST

## 2021-02-25 PROCEDURE — 93010 ELECTROCARDIOGRAM REPORT: CPT | Mod: ,,, | Performed by: SPECIALIST

## 2021-02-25 PROCEDURE — 99285 EMERGENCY DEPT VISIT HI MDM: CPT | Mod: 25

## 2021-02-25 RX ORDER — FUROSEMIDE 40 MG/1
40 TABLET ORAL DAILY
Qty: 15 TABLET | Refills: 0 | Status: SHIPPED | OUTPATIENT
Start: 2021-02-25 | End: 2021-03-22

## 2021-02-25 RX ADMIN — FUROSEMIDE 60 MG: 40 TABLET ORAL at 02:02

## 2021-02-26 ENCOUNTER — OFFICE VISIT (OUTPATIENT)
Dept: HEPATOLOGY | Facility: CLINIC | Age: 51
End: 2021-02-26
Payer: MEDICARE

## 2021-02-26 VITALS
TEMPERATURE: 98 F | BODY MASS INDEX: 20.51 KG/M2 | HEIGHT: 74 IN | OXYGEN SATURATION: 100 % | WEIGHT: 159.81 LBS | SYSTOLIC BLOOD PRESSURE: 139 MMHG | HEART RATE: 103 BPM | DIASTOLIC BLOOD PRESSURE: 83 MMHG | RESPIRATION RATE: 20 BRPM

## 2021-02-26 DIAGNOSIS — R74.8 ELEVATED LIVER ENZYMES: Primary | ICD-10-CM

## 2021-02-26 DIAGNOSIS — F10.10 ALCOHOL ABUSE: ICD-10-CM

## 2021-02-26 DIAGNOSIS — E80.6 HYPERBILIRUBINEMIA: ICD-10-CM

## 2021-02-26 DIAGNOSIS — R74.01 TRANSAMINITIS: ICD-10-CM

## 2021-02-26 DIAGNOSIS — K70.10 ALCOHOLIC HEPATITIS WITHOUT ASCITES: ICD-10-CM

## 2021-02-26 PROCEDURE — 99214 OFFICE O/P EST MOD 30 MIN: CPT | Mod: PBBFAC | Performed by: INTERNAL MEDICINE

## 2021-02-26 PROCEDURE — 99203 OFFICE O/P NEW LOW 30 MIN: CPT | Mod: S$PBB,,, | Performed by: INTERNAL MEDICINE

## 2021-02-26 PROCEDURE — 99999 PR PBB SHADOW E&M-EST. PATIENT-LVL IV: CPT | Mod: PBBFAC,,, | Performed by: INTERNAL MEDICINE

## 2021-02-26 PROCEDURE — 99203 PR OFFICE/OUTPT VISIT, NEW, LEVL III, 30-44 MIN: ICD-10-PCS | Mod: S$PBB,,, | Performed by: INTERNAL MEDICINE

## 2021-02-26 PROCEDURE — 99999 PR PBB SHADOW E&M-EST. PATIENT-LVL IV: ICD-10-PCS | Mod: PBBFAC,,, | Performed by: INTERNAL MEDICINE

## 2021-03-01 ENCOUNTER — TELEPHONE (OUTPATIENT)
Dept: FAMILY MEDICINE | Facility: CLINIC | Age: 51
End: 2021-03-01

## 2021-03-01 DIAGNOSIS — R79.89 ELEVATED BRAIN NATRIURETIC PEPTIDE (BNP) LEVEL: Primary | ICD-10-CM

## 2021-03-01 DIAGNOSIS — M79.89 LEG SWELLING: ICD-10-CM

## 2021-03-05 ENCOUNTER — HOSPITAL ENCOUNTER (OUTPATIENT)
Dept: RADIOLOGY | Facility: HOSPITAL | Age: 51
Discharge: HOME | End: 2021-03-05
Attending: NURSE PRACTITIONER
Payer: MEDICAID

## 2021-03-05 DIAGNOSIS — Z98.1 HISTORY OF LUMBAR FUSION: Primary | ICD-10-CM

## 2021-03-05 DIAGNOSIS — R17 UNSPECIFIED JAUNDICE: ICD-10-CM

## 2021-03-06 RX ORDER — OXYCODONE HYDROCHLORIDE 5 MG/1
5 TABLET ORAL EVERY 6 HOURS PRN
Qty: 28 TABLET | Refills: 0 | Status: SHIPPED | OUTPATIENT
Start: 2021-03-06 | End: 2021-03-13

## 2021-03-15 DIAGNOSIS — Z98.1 HISTORY OF LUMBAR FUSION: Primary | ICD-10-CM

## 2021-03-15 DIAGNOSIS — Z98.890 S/P HARDWARE REMOVAL: ICD-10-CM

## 2021-03-15 RX ORDER — OXYCODONE HYDROCHLORIDE 5 MG/1
5 TABLET ORAL EVERY 8 HOURS PRN
Qty: 21 TABLET | Refills: 0 | Status: SHIPPED | OUTPATIENT
Start: 2021-03-15 | End: 2021-03-22 | Stop reason: SDUPTHER

## 2021-03-22 ENCOUNTER — OFFICE VISIT (OUTPATIENT)
Dept: FAMILY MEDICINE | Facility: CLINIC | Age: 51
End: 2021-03-22
Payer: MEDICARE

## 2021-03-22 VITALS
WEIGHT: 170.63 LBS | RESPIRATION RATE: 20 BRPM | SYSTOLIC BLOOD PRESSURE: 112 MMHG | TEMPERATURE: 98 F | DIASTOLIC BLOOD PRESSURE: 78 MMHG | BODY MASS INDEX: 21.9 KG/M2 | HEIGHT: 74 IN | HEART RATE: 116 BPM | OXYGEN SATURATION: 97 %

## 2021-03-22 DIAGNOSIS — N40.0 BENIGN PROSTATIC HYPERPLASIA, UNSPECIFIED WHETHER LOWER URINARY TRACT SYMPTOMS PRESENT: ICD-10-CM

## 2021-03-22 DIAGNOSIS — E55.9 VITAMIN D DEFICIENCY: ICD-10-CM

## 2021-03-22 DIAGNOSIS — F32.A ANXIETY AND DEPRESSION: ICD-10-CM

## 2021-03-22 DIAGNOSIS — N52.9 ERECTILE DYSFUNCTION, UNSPECIFIED ERECTILE DYSFUNCTION TYPE: ICD-10-CM

## 2021-03-22 DIAGNOSIS — Z98.890 S/P HARDWARE REMOVAL: ICD-10-CM

## 2021-03-22 DIAGNOSIS — T40.2X5A CONSTIPATION DUE TO OPIOID THERAPY: ICD-10-CM

## 2021-03-22 DIAGNOSIS — D63.8 ANEMIA IN OTHER CHRONIC DISEASES CLASSIFIED ELSEWHERE: ICD-10-CM

## 2021-03-22 DIAGNOSIS — F10.21 ALCOHOLISM IN REMISSION: ICD-10-CM

## 2021-03-22 DIAGNOSIS — K59.03 CONSTIPATION DUE TO OPIOID THERAPY: ICD-10-CM

## 2021-03-22 DIAGNOSIS — F41.9 ANXIETY AND DEPRESSION: ICD-10-CM

## 2021-03-22 DIAGNOSIS — R79.89 ELEVATED BRAIN NATRIURETIC PEPTIDE (BNP) LEVEL: ICD-10-CM

## 2021-03-22 DIAGNOSIS — L29.9 PRURITUS: ICD-10-CM

## 2021-03-22 DIAGNOSIS — M79.89 LEG SWELLING: Primary | ICD-10-CM

## 2021-03-22 DIAGNOSIS — Z98.1 HISTORY OF LUMBAR FUSION: ICD-10-CM

## 2021-03-22 PROCEDURE — 99214 PR OFFICE/OUTPT VISIT, EST, LEVL IV, 30-39 MIN: ICD-10-PCS | Mod: S$PBB,,, | Performed by: NURSE PRACTITIONER

## 2021-03-22 PROCEDURE — 99215 OFFICE O/P EST HI 40 MIN: CPT | Performed by: NURSE PRACTITIONER

## 2021-03-22 PROCEDURE — 99214 OFFICE O/P EST MOD 30 MIN: CPT | Mod: S$PBB,,, | Performed by: NURSE PRACTITIONER

## 2021-03-22 RX ORDER — MELOXICAM 15 MG/1
TABLET ORAL
COMMUNITY
Start: 2021-03-19 | End: 2021-05-07

## 2021-03-22 RX ORDER — FUROSEMIDE 20 MG/1
20 TABLET ORAL DAILY PRN
Qty: 30 TABLET | Refills: 1 | Status: SHIPPED | OUTPATIENT
Start: 2021-03-22 | End: 2022-02-22 | Stop reason: CLARIF

## 2021-03-22 RX ORDER — ERGOCALCIFEROL 1.25 MG/1
50000 CAPSULE ORAL
Qty: 12 CAPSULE | Refills: 1 | Status: SHIPPED | OUTPATIENT
Start: 2021-03-22 | End: 2022-02-22 | Stop reason: CLARIF

## 2021-03-22 RX ORDER — CETIRIZINE HYDROCHLORIDE 10 MG/1
10 TABLET ORAL NIGHTLY
Qty: 30 TABLET | Refills: 3 | Status: SHIPPED | OUTPATIENT
Start: 2021-03-22 | End: 2021-07-11

## 2021-03-22 RX ORDER — SILDENAFIL 50 MG/1
50 TABLET, FILM COATED ORAL DAILY PRN
Qty: 30 TABLET | Refills: 0 | Status: SHIPPED | OUTPATIENT
Start: 2021-03-22 | End: 2021-09-17

## 2021-03-22 RX ORDER — CYCLOBENZAPRINE HCL 10 MG
TABLET ORAL
COMMUNITY
Start: 2021-03-18 | End: 2021-05-07

## 2021-03-22 RX ORDER — LUBIPROSTONE 24 UG/1
24 CAPSULE ORAL 2 TIMES DAILY WITH MEALS
Qty: 60 CAPSULE | Refills: 3 | Status: SHIPPED | OUTPATIENT
Start: 2021-03-22 | End: 2021-06-23

## 2021-03-22 RX ORDER — TAMSULOSIN HYDROCHLORIDE 0.4 MG/1
0.4 CAPSULE ORAL DAILY
Qty: 30 CAPSULE | Refills: 3 | Status: SHIPPED | OUTPATIENT
Start: 2021-03-22 | End: 2021-06-23 | Stop reason: SDUPTHER

## 2021-03-23 RX ORDER — OXYCODONE HYDROCHLORIDE 5 MG/1
5 TABLET ORAL EVERY 8 HOURS PRN
Qty: 21 TABLET | Refills: 0 | Status: SHIPPED | OUTPATIENT
Start: 2021-03-23 | End: 2021-03-31

## 2021-03-24 ENCOUNTER — OFFICE VISIT (OUTPATIENT)
Dept: ORTHOPEDICS | Facility: CLINIC | Age: 51
End: 2021-03-24
Payer: MEDICAID

## 2021-03-24 VITALS
DIASTOLIC BLOOD PRESSURE: 84 MMHG | HEART RATE: 93 BPM | BODY MASS INDEX: 21.82 KG/M2 | SYSTOLIC BLOOD PRESSURE: 146 MMHG | HEIGHT: 74 IN | WEIGHT: 170 LBS

## 2021-03-24 DIAGNOSIS — Z98.890 S/P HARDWARE REMOVAL: Primary | ICD-10-CM

## 2021-03-24 PROCEDURE — 99024 POSTOP FOLLOW-UP VISIT: CPT | Mod: S$GLB,,, | Performed by: ORTHOPAEDIC SURGERY

## 2021-03-24 PROCEDURE — 99024 PR POST-OP FOLLOW-UP VISIT: ICD-10-PCS | Mod: S$GLB,,, | Performed by: ORTHOPAEDIC SURGERY

## 2021-03-31 DIAGNOSIS — Z98.1 HISTORY OF LUMBAR FUSION: Primary | ICD-10-CM

## 2021-03-31 RX ORDER — OXYCODONE HYDROCHLORIDE 5 MG/1
5 TABLET ORAL EVERY 12 HOURS PRN
Qty: 14 TABLET | Refills: 0 | Status: SHIPPED | OUTPATIENT
Start: 2021-03-31 | End: 2021-04-07

## 2021-04-07 ENCOUNTER — CLINICAL SUPPORT (OUTPATIENT)
Dept: CARDIOLOGY | Facility: HOSPITAL | Age: 51
End: 2021-04-07
Attending: NURSE PRACTITIONER
Payer: MEDICARE

## 2021-04-07 VITALS — WEIGHT: 170 LBS | BODY MASS INDEX: 21.82 KG/M2 | HEIGHT: 74 IN

## 2021-04-07 DIAGNOSIS — R79.89 ELEVATED BRAIN NATRIURETIC PEPTIDE (BNP) LEVEL: ICD-10-CM

## 2021-04-07 DIAGNOSIS — M79.89 LEG SWELLING: ICD-10-CM

## 2021-04-07 PROCEDURE — 93306 TTE W/DOPPLER COMPLETE: CPT | Mod: 26,,, | Performed by: INTERNAL MEDICINE

## 2021-04-07 PROCEDURE — 93306 TTE W/DOPPLER COMPLETE: CPT

## 2021-04-07 PROCEDURE — 93306 ECHO (CUPID ONLY): ICD-10-PCS | Mod: 26,,, | Performed by: INTERNAL MEDICINE

## 2021-04-08 LAB
AORTIC ROOT ANNULUS: 3.73 CM
AORTIC VALVE CUSP SEPERATION: 2.17 CM
AV INDEX (PROSTH): 0.92
AV MEAN GRADIENT: 3 MMHG
AV PEAK GRADIENT: 4 MMHG
AV VALVE AREA: 3.06 CM2
AV VELOCITY RATIO: 94.49
BSA FOR ECHO PROCEDURE: 2.01 M2
CV ECHO LV RWT: 0.43 CM
DOP CALC AO PEAK VEL: 1.04 M/S
DOP CALC AO VTI: 20.9 CM
DOP CALC LVOT AREA: 3.3 CM2
DOP CALC LVOT DIAMETER: 2.06 CM
DOP CALC LVOT PEAK VEL: 98.27 M/S
DOP CALC LVOT STROKE VOLUME: 63.93 CM3
DOP CALCLVOT PEAK VEL VTI: 19.19 CM
E WAVE DECELERATION TIME: 215.41 MSEC
E/A RATIO: 0.93
E/E' RATIO: 6.84 M/S
ECHO LV POSTERIOR WALL: 0.85 CM (ref 0.6–1.1)
EJECTION FRACTION: 65 %
FRACTIONAL SHORTENING: 30 % (ref 28–44)
INTERVENTRICULAR SEPTUM: 0.99 CM (ref 0.6–1.1)
IVRT: 91.17 MSEC
LEFT ATRIUM SIZE: 3.98 CM
LEFT INTERNAL DIMENSION IN SYSTOLE: 2.77 CM (ref 2.1–4)
LEFT VENTRICLE MASS INDEX: 54 G/M2
LEFT VENTRICULAR INTERNAL DIMENSION IN DIASTOLE: 3.93 CM (ref 3.5–6)
LEFT VENTRICULAR MASS: 109.93 G
LV LATERAL E/E' RATIO: 5.91 M/S
LV SEPTAL E/E' RATIO: 8.13 M/S
MV PEAK A VEL: 0.7 M/S
MV PEAK E VEL: 0.65 M/S
PISA TR MAX VEL: 2.36 M/S
PV PEAK VELOCITY: 94.11 CM/S
RA PRESSURE: 3 MMHG
RIGHT VENTRICULAR END-DIASTOLIC DIMENSION: 294 CM
TDI LATERAL: 0.11 M/S
TDI SEPTAL: 0.08 M/S
TDI: 0.1 M/S
TR MAX PG: 22 MMHG
TV REST PULMONARY ARTERY PRESSURE: 25 MMHG

## 2021-04-09 ENCOUNTER — TELEPHONE (OUTPATIENT)
Dept: FAMILY MEDICINE | Facility: CLINIC | Age: 51
End: 2021-04-09

## 2021-04-14 DIAGNOSIS — Z98.890 S/P HARDWARE REMOVAL: Primary | ICD-10-CM

## 2021-04-14 RX ORDER — OXYCODONE AND ACETAMINOPHEN 5; 325 MG/1; MG/1
1 TABLET ORAL EVERY 8 HOURS PRN
Qty: 21 TABLET | Refills: 0 | Status: SHIPPED | OUTPATIENT
Start: 2021-04-14 | End: 2021-04-21

## 2021-04-29 ENCOUNTER — PATIENT MESSAGE (OUTPATIENT)
Dept: RESEARCH | Facility: HOSPITAL | Age: 51
End: 2021-04-29

## 2021-04-29 DIAGNOSIS — Z98.890 S/P HARDWARE REMOVAL: Primary | ICD-10-CM

## 2021-04-29 RX ORDER — OXYCODONE HYDROCHLORIDE 5 MG/1
5 CAPSULE ORAL EVERY 12 HOURS PRN
Qty: 14 CAPSULE | Refills: 0 | Status: SHIPPED | OUTPATIENT
Start: 2021-04-29 | End: 2021-04-29 | Stop reason: CLARIF

## 2021-04-29 RX ORDER — OXYCODONE HYDROCHLORIDE 5 MG/1
5 TABLET ORAL EVERY 12 HOURS PRN
Qty: 14 TABLET | Refills: 0 | Status: SHIPPED | OUTPATIENT
Start: 2021-04-29 | End: 2021-05-09

## 2021-05-07 DIAGNOSIS — M79.89 LEG SWELLING: ICD-10-CM

## 2021-05-07 RX ORDER — FUROSEMIDE 20 MG/1
TABLET ORAL
Qty: 30 TABLET | Refills: 1 | OUTPATIENT
Start: 2021-05-07

## 2021-05-17 ENCOUNTER — TELEPHONE (OUTPATIENT)
Dept: FAMILY MEDICINE | Facility: CLINIC | Age: 51
End: 2021-05-17

## 2021-05-17 DIAGNOSIS — T40.2X5A CONSTIPATION DUE TO OPIOID THERAPY: Primary | ICD-10-CM

## 2021-05-17 DIAGNOSIS — K59.03 CONSTIPATION DUE TO OPIOID THERAPY: Primary | ICD-10-CM

## 2021-05-18 RX ORDER — NALOXEGOL OXALATE 25 MG/1
25 TABLET, FILM COATED ORAL DAILY
Qty: 30 TABLET | Refills: 3 | Status: SHIPPED | OUTPATIENT
Start: 2021-05-18 | End: 2021-09-30

## 2021-05-21 ENCOUNTER — TELEPHONE (OUTPATIENT)
Dept: ORTHOPEDICS | Facility: CLINIC | Age: 51
End: 2021-05-21

## 2021-06-02 ENCOUNTER — OFFICE VISIT (OUTPATIENT)
Dept: ORTHOPEDICS | Facility: CLINIC | Age: 51
End: 2021-06-02
Payer: COMMERCIAL

## 2021-06-02 VITALS — WEIGHT: 175 LBS | BODY MASS INDEX: 21.31 KG/M2 | HEIGHT: 76 IN

## 2021-06-02 DIAGNOSIS — Z98.890 S/P HARDWARE REMOVAL: Primary | ICD-10-CM

## 2021-06-02 DIAGNOSIS — Z98.1 HISTORY OF LUMBAR FUSION: ICD-10-CM

## 2021-06-02 DIAGNOSIS — G89.28 CHRONIC POSTOPERATIVE PAIN: ICD-10-CM

## 2021-06-02 PROCEDURE — 99213 OFFICE O/P EST LOW 20 MIN: CPT | Mod: S$GLB,,, | Performed by: ORTHOPAEDIC SURGERY

## 2021-06-02 PROCEDURE — 99213 PR OFFICE/OUTPT VISIT, EST, LEVL III, 20-29 MIN: ICD-10-PCS | Mod: S$GLB,,, | Performed by: ORTHOPAEDIC SURGERY

## 2021-06-02 RX ORDER — DULOXETIN HYDROCHLORIDE 30 MG/1
30 CAPSULE, DELAYED RELEASE ORAL
COMMUNITY
Start: 2020-10-05 | End: 2021-06-23

## 2021-06-02 RX ORDER — HYDROCODONE BITARTRATE AND ACETAMINOPHEN 5; 325 MG/1; MG/1
1 TABLET ORAL DAILY PRN
COMMUNITY
Start: 2020-10-19 | End: 2021-12-15

## 2021-06-02 RX ORDER — OXYCODONE HYDROCHLORIDE 5 MG/1
5 TABLET ORAL EVERY 12 HOURS PRN
Qty: 14 TABLET | Refills: 0 | Status: SHIPPED | OUTPATIENT
Start: 2021-06-02 | End: 2021-12-15

## 2021-06-02 RX ORDER — PREGABALIN 75 MG/1
1 CAPSULE ORAL
COMMUNITY
Start: 2020-08-25 | End: 2021-06-23

## 2021-06-14 DIAGNOSIS — G89.29 CHRONIC PAIN OF BOTH KNEES: ICD-10-CM

## 2021-06-14 DIAGNOSIS — G60.9 IDIOPATHIC PERIPHERAL NEUROPATHY: ICD-10-CM

## 2021-06-14 DIAGNOSIS — G89.29 CHRONIC BILATERAL LOW BACK PAIN WITHOUT SCIATICA: ICD-10-CM

## 2021-06-14 DIAGNOSIS — M54.50 CHRONIC BILATERAL LOW BACK PAIN WITHOUT SCIATICA: ICD-10-CM

## 2021-06-14 DIAGNOSIS — M25.562 CHRONIC PAIN OF BOTH KNEES: ICD-10-CM

## 2021-06-14 DIAGNOSIS — G89.28 CHRONIC POSTOPERATIVE PAIN: ICD-10-CM

## 2021-06-14 DIAGNOSIS — R29.6 FREQUENT FALLS: ICD-10-CM

## 2021-06-14 DIAGNOSIS — M50.30 DDD (DEGENERATIVE DISC DISEASE), CERVICAL: ICD-10-CM

## 2021-06-14 DIAGNOSIS — M54.2 NECK PAIN, CHRONIC: ICD-10-CM

## 2021-06-14 DIAGNOSIS — G89.29 NECK PAIN, CHRONIC: ICD-10-CM

## 2021-06-14 DIAGNOSIS — M54.16 LUMBAR RADICULITIS: ICD-10-CM

## 2021-06-14 DIAGNOSIS — M54.2 CERVICAL PAIN (NECK): ICD-10-CM

## 2021-06-14 DIAGNOSIS — M47.812 ARTHROPATHY OF CERVICAL FACET JOINT: ICD-10-CM

## 2021-06-14 DIAGNOSIS — Z98.890 S/P HARDWARE REMOVAL: ICD-10-CM

## 2021-06-14 DIAGNOSIS — M25.561 CHRONIC PAIN OF BOTH KNEES: ICD-10-CM

## 2021-06-14 DIAGNOSIS — M54.2 CERVICAL SPINE PAIN: ICD-10-CM

## 2021-06-14 DIAGNOSIS — Z98.1 HISTORY OF LUMBAR FUSION: ICD-10-CM

## 2021-06-14 RX ORDER — DULOXETIN HYDROCHLORIDE 60 MG/1
CAPSULE, DELAYED RELEASE ORAL
Qty: 30 CAPSULE | Refills: 5 | Status: SHIPPED | OUTPATIENT
Start: 2021-06-14 | End: 2022-01-10

## 2021-06-14 RX ORDER — TRAZODONE HYDROCHLORIDE 50 MG/1
TABLET ORAL
Qty: 60 TABLET | Refills: 2 | Status: SHIPPED | OUTPATIENT
Start: 2021-06-14 | End: 2021-09-30

## 2021-06-16 ENCOUNTER — TELEPHONE (OUTPATIENT)
Dept: ORTHOPEDICS | Facility: CLINIC | Age: 51
End: 2021-06-16

## 2021-06-16 RX ORDER — OXYCODONE HYDROCHLORIDE 5 MG/1
5 TABLET ORAL EVERY 12 HOURS PRN
Qty: 14 TABLET | Refills: 0 | OUTPATIENT
Start: 2021-06-16

## 2021-06-21 ENCOUNTER — DOCUMENTATION ONLY (OUTPATIENT)
Dept: REHABILITATION | Facility: HOSPITAL | Age: 51
End: 2021-06-21

## 2021-06-23 ENCOUNTER — LAB VISIT (OUTPATIENT)
Dept: LAB | Facility: HOSPITAL | Age: 51
End: 2021-06-23
Attending: NURSE PRACTITIONER
Payer: COMMERCIAL

## 2021-06-23 ENCOUNTER — OFFICE VISIT (OUTPATIENT)
Dept: FAMILY MEDICINE | Facility: CLINIC | Age: 51
End: 2021-06-23
Payer: COMMERCIAL

## 2021-06-23 VITALS
TEMPERATURE: 99 F | RESPIRATION RATE: 18 BRPM | SYSTOLIC BLOOD PRESSURE: 122 MMHG | OXYGEN SATURATION: 98 % | BODY MASS INDEX: 21.27 KG/M2 | HEART RATE: 108 BPM | WEIGHT: 174.69 LBS | DIASTOLIC BLOOD PRESSURE: 88 MMHG | HEIGHT: 76 IN

## 2021-06-23 DIAGNOSIS — L03.115 CELLULITIS OF RIGHT LOWER EXTREMITY: Primary | ICD-10-CM

## 2021-06-23 DIAGNOSIS — Z12.5 PROSTATE CANCER SCREENING: ICD-10-CM

## 2021-06-23 DIAGNOSIS — E55.9 VITAMIN D DEFICIENCY: ICD-10-CM

## 2021-06-23 DIAGNOSIS — F41.9 ANXIETY AND DEPRESSION: ICD-10-CM

## 2021-06-23 DIAGNOSIS — B35.3 TINEA PEDIS OF RIGHT FOOT: ICD-10-CM

## 2021-06-23 DIAGNOSIS — R79.89 ELEVATED BRAIN NATRIURETIC PEPTIDE (BNP) LEVEL: ICD-10-CM

## 2021-06-23 DIAGNOSIS — E87.1 HYPONATREMIA: ICD-10-CM

## 2021-06-23 DIAGNOSIS — N40.0 BENIGN PROSTATIC HYPERPLASIA, UNSPECIFIED WHETHER LOWER URINARY TRACT SYMPTOMS PRESENT: ICD-10-CM

## 2021-06-23 DIAGNOSIS — L03.115 CELLULITIS OF RIGHT LOWER EXTREMITY: ICD-10-CM

## 2021-06-23 DIAGNOSIS — Z12.11 COLON CANCER SCREENING: ICD-10-CM

## 2021-06-23 DIAGNOSIS — F32.A ANXIETY AND DEPRESSION: ICD-10-CM

## 2021-06-23 DIAGNOSIS — D63.8 ANEMIA IN OTHER CHRONIC DISEASES CLASSIFIED ELSEWHERE: ICD-10-CM

## 2021-06-23 LAB
25(OH)D3+25(OH)D2 SERPL-MCNC: 51 NG/ML (ref 30–96)
ALBUMIN SERPL BCP-MCNC: 4.2 G/DL (ref 3.5–5.2)
ALP SERPL-CCNC: 79 U/L (ref 55–135)
ALT SERPL W/O P-5'-P-CCNC: 28 U/L (ref 10–44)
ANION GAP SERPL CALC-SCNC: 10 MMOL/L (ref 8–16)
AST SERPL-CCNC: 32 U/L (ref 10–40)
BASOPHILS # BLD AUTO: 0.05 K/UL (ref 0–0.2)
BASOPHILS NFR BLD: 0.8 % (ref 0–1.9)
BILIRUB SERPL-MCNC: 0.7 MG/DL (ref 0.1–1)
BNP SERPL-MCNC: 28 PG/ML (ref 0–99)
BUN SERPL-MCNC: 8 MG/DL (ref 6–20)
CALCIUM SERPL-MCNC: 9.1 MG/DL (ref 8.7–10.5)
CHLORIDE SERPL-SCNC: 90 MMOL/L (ref 95–110)
CO2 SERPL-SCNC: 28 MMOL/L (ref 23–29)
COMPLEXED PSA SERPL-MCNC: 1.5 NG/ML (ref 0–4)
CREAT SERPL-MCNC: 0.9 MG/DL (ref 0.5–1.4)
DIFFERENTIAL METHOD: ABNORMAL
EOSINOPHIL # BLD AUTO: 0.1 K/UL (ref 0–0.5)
EOSINOPHIL NFR BLD: 1.1 % (ref 0–8)
ERYTHROCYTE [DISTWIDTH] IN BLOOD BY AUTOMATED COUNT: 14 % (ref 11.5–14.5)
EST. GFR  (AFRICAN AMERICAN): >60 ML/MIN/1.73 M^2
EST. GFR  (NON AFRICAN AMERICAN): >60 ML/MIN/1.73 M^2
GLUCOSE SERPL-MCNC: 87 MG/DL (ref 70–110)
HCT VFR BLD AUTO: 45.9 % (ref 40–54)
HGB BLD-MCNC: 16.2 G/DL (ref 14–18)
IMM GRANULOCYTES # BLD AUTO: 0.02 K/UL (ref 0–0.04)
IMM GRANULOCYTES NFR BLD AUTO: 0.3 % (ref 0–0.5)
LYMPHOCYTES # BLD AUTO: 1.7 K/UL (ref 1–4.8)
LYMPHOCYTES NFR BLD: 27.6 % (ref 18–48)
MCH RBC QN AUTO: 33.5 PG (ref 27–31)
MCHC RBC AUTO-ENTMCNC: 35.3 G/DL (ref 32–36)
MCV RBC AUTO: 95 FL (ref 82–98)
MONOCYTES # BLD AUTO: 0.8 K/UL (ref 0.3–1)
MONOCYTES NFR BLD: 13.6 % (ref 4–15)
NEUTROPHILS # BLD AUTO: 3.4 K/UL (ref 1.8–7.7)
NEUTROPHILS NFR BLD: 56.6 % (ref 38–73)
NRBC BLD-RTO: 0 /100 WBC
PLATELET # BLD AUTO: 189 K/UL (ref 150–450)
PMV BLD AUTO: 9.7 FL (ref 9.2–12.9)
POTASSIUM SERPL-SCNC: 4.2 MMOL/L (ref 3.5–5.1)
PROT SERPL-MCNC: 7.3 G/DL (ref 6–8.4)
RBC # BLD AUTO: 4.84 M/UL (ref 4.6–6.2)
SODIUM SERPL-SCNC: 128 MMOL/L (ref 136–145)
WBC # BLD AUTO: 6.09 K/UL (ref 3.9–12.7)

## 2021-06-23 PROCEDURE — 83880 ASSAY OF NATRIURETIC PEPTIDE: CPT | Performed by: NURSE PRACTITIONER

## 2021-06-23 PROCEDURE — 82306 VITAMIN D 25 HYDROXY: CPT | Performed by: NURSE PRACTITIONER

## 2021-06-23 PROCEDURE — 99214 OFFICE O/P EST MOD 30 MIN: CPT | Mod: S$GLB,,, | Performed by: NURSE PRACTITIONER

## 2021-06-23 PROCEDURE — 85025 COMPLETE CBC W/AUTO DIFF WBC: CPT | Performed by: NURSE PRACTITIONER

## 2021-06-23 PROCEDURE — 99214 PR OFFICE/OUTPT VISIT, EST, LEVL IV, 30-39 MIN: ICD-10-PCS | Mod: S$GLB,,, | Performed by: NURSE PRACTITIONER

## 2021-06-23 PROCEDURE — 36415 COLL VENOUS BLD VENIPUNCTURE: CPT | Performed by: NURSE PRACTITIONER

## 2021-06-23 PROCEDURE — 84153 ASSAY OF PSA TOTAL: CPT | Performed by: NURSE PRACTITIONER

## 2021-06-23 PROCEDURE — 80053 COMPREHEN METABOLIC PANEL: CPT | Performed by: NURSE PRACTITIONER

## 2021-06-23 RX ORDER — SULFAMETHOXAZOLE AND TRIMETHOPRIM 800; 160 MG/1; MG/1
1 TABLET ORAL 2 TIMES DAILY
Qty: 14 TABLET | Refills: 0 | Status: SHIPPED | OUTPATIENT
Start: 2021-06-23 | End: 2021-06-30

## 2021-06-23 RX ORDER — CLOTRIMAZOLE AND BETAMETHASONE DIPROPIONATE 10; .64 MG/G; MG/G
CREAM TOPICAL 2 TIMES DAILY
Qty: 1 TUBE | Refills: 0 | Status: SHIPPED | OUTPATIENT
Start: 2021-06-23 | End: 2021-07-07

## 2021-06-23 RX ORDER — TAMSULOSIN HYDROCHLORIDE 0.4 MG/1
0.8 CAPSULE ORAL DAILY
Qty: 60 CAPSULE | Refills: 3 | Status: SHIPPED | OUTPATIENT
Start: 2021-06-23 | End: 2021-11-08

## 2021-07-08 LAB — NONINV COLON CA DNA+OCC BLD SCRN STL QL: NORMAL

## 2021-07-14 ENCOUNTER — TELEPHONE (OUTPATIENT)
Dept: FAMILY MEDICINE | Facility: CLINIC | Age: 51
End: 2021-07-14

## 2021-08-06 ENCOUNTER — PATIENT MESSAGE (OUTPATIENT)
Dept: FAMILY MEDICINE | Facility: CLINIC | Age: 51
End: 2021-08-06

## 2021-08-06 ENCOUNTER — TELEPHONE (OUTPATIENT)
Dept: FAMILY MEDICINE | Facility: CLINIC | Age: 51
End: 2021-08-06

## 2021-08-06 ENCOUNTER — TELEPHONE (OUTPATIENT)
Dept: PSYCHIATRY | Facility: CLINIC | Age: 51
End: 2021-08-06

## 2021-09-22 ENCOUNTER — OFFICE VISIT (OUTPATIENT)
Dept: ORTHOPEDICS | Facility: CLINIC | Age: 51
End: 2021-09-22
Payer: COMMERCIAL

## 2021-09-22 VITALS — WEIGHT: 174 LBS | BODY MASS INDEX: 21.19 KG/M2 | HEIGHT: 76 IN

## 2021-09-22 DIAGNOSIS — G89.29 NECK PAIN, CHRONIC: ICD-10-CM

## 2021-09-22 DIAGNOSIS — M54.2 NECK PAIN, CHRONIC: ICD-10-CM

## 2021-09-22 DIAGNOSIS — G89.28 CHRONIC POSTOPERATIVE PAIN: ICD-10-CM

## 2021-09-22 DIAGNOSIS — M50.30 DDD (DEGENERATIVE DISC DISEASE), CERVICAL: ICD-10-CM

## 2021-09-22 DIAGNOSIS — M47.812 ARTHROPATHY OF CERVICAL FACET JOINT: ICD-10-CM

## 2021-09-22 DIAGNOSIS — Z98.890 S/P HARDWARE REMOVAL: ICD-10-CM

## 2021-09-22 DIAGNOSIS — Z98.1 HISTORY OF LUMBAR FUSION: Primary | ICD-10-CM

## 2021-09-22 PROCEDURE — 99213 OFFICE O/P EST LOW 20 MIN: CPT | Mod: S$GLB,,, | Performed by: ORTHOPAEDIC SURGERY

## 2021-09-22 PROCEDURE — 99213 PR OFFICE/OUTPT VISIT, EST, LEVL III, 20-29 MIN: ICD-10-PCS | Mod: S$GLB,,, | Performed by: ORTHOPAEDIC SURGERY

## 2021-09-22 RX ORDER — OXYCODONE AND ACETAMINOPHEN 7.5; 325 MG/1; MG/1
TABLET ORAL
COMMUNITY
Start: 2021-09-13 | End: 2021-12-15

## 2021-09-22 RX ORDER — OXYCODONE AND ACETAMINOPHEN 7.5; 325 MG/1; MG/1
1 TABLET ORAL EVERY 6 HOURS PRN
Qty: 28 TABLET | Refills: 0 | Status: SHIPPED | OUTPATIENT
Start: 2021-09-22 | End: 2021-09-29

## 2021-09-30 DIAGNOSIS — G60.9 IDIOPATHIC PERIPHERAL NEUROPATHY: ICD-10-CM

## 2021-09-30 DIAGNOSIS — R29.6 FREQUENT FALLS: ICD-10-CM

## 2021-09-30 DIAGNOSIS — M50.30 DDD (DEGENERATIVE DISC DISEASE), CERVICAL: ICD-10-CM

## 2021-09-30 DIAGNOSIS — Z98.1 HISTORY OF LUMBAR FUSION: ICD-10-CM

## 2021-09-30 DIAGNOSIS — M47.812 ARTHROPATHY OF CERVICAL FACET JOINT: ICD-10-CM

## 2021-09-30 DIAGNOSIS — M54.16 LUMBAR RADICULITIS: ICD-10-CM

## 2021-09-30 DIAGNOSIS — M54.2 CERVICAL PAIN (NECK): ICD-10-CM

## 2021-09-30 DIAGNOSIS — G89.29 NECK PAIN, CHRONIC: ICD-10-CM

## 2021-09-30 DIAGNOSIS — M25.561 CHRONIC PAIN OF BOTH KNEES: ICD-10-CM

## 2021-09-30 DIAGNOSIS — M54.2 CERVICAL SPINE PAIN: ICD-10-CM

## 2021-09-30 DIAGNOSIS — G89.29 CHRONIC BILATERAL LOW BACK PAIN WITHOUT SCIATICA: ICD-10-CM

## 2021-09-30 DIAGNOSIS — M54.50 CHRONIC BILATERAL LOW BACK PAIN WITHOUT SCIATICA: ICD-10-CM

## 2021-09-30 DIAGNOSIS — M25.562 CHRONIC PAIN OF BOTH KNEES: ICD-10-CM

## 2021-09-30 DIAGNOSIS — G89.29 CHRONIC PAIN OF BOTH KNEES: ICD-10-CM

## 2021-09-30 DIAGNOSIS — M54.2 NECK PAIN, CHRONIC: ICD-10-CM

## 2021-09-30 RX ORDER — TRAZODONE HYDROCHLORIDE 50 MG/1
TABLET ORAL
Qty: 60 TABLET | Refills: 2 | Status: SHIPPED | OUTPATIENT
Start: 2021-09-30 | End: 2022-01-10

## 2021-11-11 ENCOUNTER — TELEPHONE (OUTPATIENT)
Dept: FAMILY MEDICINE | Facility: CLINIC | Age: 51
End: 2021-11-11
Payer: MEDICAID

## 2021-12-01 ENCOUNTER — TELEPHONE (OUTPATIENT)
Dept: FAMILY MEDICINE | Facility: CLINIC | Age: 51
End: 2021-12-01
Payer: MEDICAID

## 2021-12-15 ENCOUNTER — OFFICE VISIT (OUTPATIENT)
Dept: ORTHOPEDICS | Facility: CLINIC | Age: 51
End: 2021-12-15
Payer: COMMERCIAL

## 2021-12-15 VITALS — BODY MASS INDEX: 21.19 KG/M2 | WEIGHT: 174 LBS | HEIGHT: 76 IN

## 2021-12-15 DIAGNOSIS — G89.29 NECK PAIN, CHRONIC: ICD-10-CM

## 2021-12-15 DIAGNOSIS — M54.2 NECK PAIN, CHRONIC: ICD-10-CM

## 2021-12-15 DIAGNOSIS — M50.30 DDD (DEGENERATIVE DISC DISEASE), CERVICAL: Primary | ICD-10-CM

## 2021-12-15 DIAGNOSIS — M47.812 ARTHROPATHY OF CERVICAL FACET JOINT: ICD-10-CM

## 2021-12-15 DIAGNOSIS — Z98.1 HISTORY OF LUMBAR SPINAL FUSION: ICD-10-CM

## 2021-12-15 DIAGNOSIS — M51.36 DISC DEGENERATION, LUMBAR: ICD-10-CM

## 2021-12-15 DIAGNOSIS — M47.816 LUMBAR FACET ARTHROPATHY: ICD-10-CM

## 2021-12-15 DIAGNOSIS — G89.28 CHRONIC POSTOPERATIVE PAIN: ICD-10-CM
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
## 2021-12-15 PROCEDURE — 99213 PR OFFICE/OUTPT VISIT, EST, LEVL III, 20-29 MIN: ICD-10-PCS | Mod: S$GLB,,, | Performed by: ORTHOPAEDIC SURGERY

## 2021-12-15 PROCEDURE — 99213 OFFICE O/P EST LOW 20 MIN: CPT | Mod: S$GLB,,, | Performed by: ORTHOPAEDIC SURGERY

## 2021-12-15 RX ORDER — HYDROCODONE BITARTRATE AND ACETAMINOPHEN 7.5; 325 MG/1; MG/1
1 TABLET ORAL EVERY 6 HOURS PRN
Qty: 28 TABLET | Refills: 0 | Status: SHIPPED | OUTPATIENT
Start: 2021-12-15 | End: 2021-12-22

## 2021-12-15 RX ORDER — TRAMADOL HYDROCHLORIDE 50 MG/1
TABLET ORAL
COMMUNITY
Start: 2021-11-11 | End: 2021-12-15 | Stop reason: ALTCHOICE

## 2021-12-28 ENCOUNTER — PATIENT MESSAGE (OUTPATIENT)
Dept: ORTHOPEDICS | Facility: CLINIC | Age: 51
End: 2021-12-28
Payer: MEDICAID

## 2021-12-30 ENCOUNTER — TELEPHONE (OUTPATIENT)
Dept: ORTHOPEDICS | Facility: CLINIC | Age: 51
End: 2021-12-30
Payer: MEDICAID

## 2022-01-05 DIAGNOSIS — G89.29 NECK PAIN, CHRONIC: ICD-10-CM

## 2022-01-05 DIAGNOSIS — M54.2 NECK PAIN, CHRONIC: ICD-10-CM

## 2022-01-05 DIAGNOSIS — M47.812 ARTHROPATHY OF CERVICAL FACET JOINT: Primary | ICD-10-CM

## 2022-01-05 RX ORDER — HYDROCODONE BITARTRATE AND ACETAMINOPHEN 7.5; 325 MG/1; MG/1
1 TABLET ORAL EVERY 6 HOURS PRN
Qty: 28 TABLET | Refills: 0 | Status: SHIPPED | OUTPATIENT
Start: 2022-01-05 | End: 2022-01-12

## 2022-01-05 NOTE — TELEPHONE ENCOUNTER
----- Message from Cherelle Davies sent at 1/5/2022 10:12 AM CST -----  Phone #: [phone]  Patient is requesting a refill of [place] 7.5 325mg  Pharmacy:         [facility] [place] - [address]  [address]  [place] [address]  Phone: [phone] Fax: [phone]        Pt would  like for you  to  call him  concerning surgery that was canceled. Phone number is [phone]

## 2022-01-05 NOTE — TELEPHONE ENCOUNTER
----- Message from Kezia Giang sent at 1/4/2022  9:34 AM CST -----  [phone]-He stated you need to call his insurance again for surgery approval

## 2022-01-05 NOTE — TELEPHONE ENCOUNTER
Spoke with the patient. We discussed that his insurance is requiring documentation that prior to approving his surgery, he needs to remain smoke free for 4 weeks prior to surgery and after surgery. The patient has agreed to this and agrees to stop smoking as of January 10, 2022. He will call back on Monday, January 10 to confirm he did stop smoking.

## 2022-01-24 ENCOUNTER — TELEPHONE (OUTPATIENT)
Dept: ORTHOPEDICS | Facility: CLINIC | Age: 52
End: 2022-01-24
Payer: MEDICAID

## 2022-01-24 NOTE — TELEPHONE ENCOUNTER
----- Message from Gaby Preston sent at 1/20/2022 12:27 PM CST -----  Phone #: [phone]  Patient is requesting a refill of [place] 7.5 - 325 mg   Pharmacy:   [facility] [place]  [address]  [address]  [place] [address]  Phone: [phone] Fax: [phone]

## 2022-01-24 NOTE — TELEPHONE ENCOUNTER
Spoke with patient. He never called on janaury 10 to confirm he stopped smoking. Unable to refill medication at this time.     He now is stating as of today January 24, 2022 that he will stop smoking.

## 2022-01-31 ENCOUNTER — TELEPHONE (OUTPATIENT)
Dept: ORTHOPEDICS | Facility: CLINIC | Age: 52
End: 2022-01-31
Payer: MEDICAID

## 2022-01-31 NOTE — TELEPHONE ENCOUNTER
----- Message from Gaby Preston sent at 1/31/2022  1:15 PM CST -----  Phone #: [phone]  Patient is requesting a refill of [place] 7.5 - 325 mg  Pharmacy:   [facility] [place]  [address]  [address]  [place] [address]  Phone: [phone] Fax: [phone]

## 2022-02-07 ENCOUNTER — TELEPHONE (OUTPATIENT)
Dept: ORTHOPEDICS | Facility: CLINIC | Age: 52
End: 2022-02-07
Payer: MEDICAID

## 2022-02-07 NOTE — TELEPHONE ENCOUNTER
----- Message from Kezia Giang sent at 2/7/2022  3:49 PM CST -----  [phone]-Please call him about his severe cervical pain, he cannot hold his head up

## 2022-02-07 NOTE — TELEPHONE ENCOUNTER
Spoke with the patient. He would like us to try and re-submit auth for surgery. His pain is very severe. He has remained smoke free since 1/24/2022.

## 2022-02-09 DIAGNOSIS — G89.29 NECK PAIN, CHRONIC: ICD-10-CM

## 2022-02-09 DIAGNOSIS — M50.30 DDD (DEGENERATIVE DISC DISEASE), CERVICAL: Primary | ICD-10-CM

## 2022-02-09 DIAGNOSIS — M47.812 ARTHROPATHY OF CERVICAL FACET JOINT: ICD-10-CM

## 2022-02-09 DIAGNOSIS — M54.2 NECK PAIN, CHRONIC: ICD-10-CM

## 2022-02-09 RX ORDER — HYDROCODONE BITARTRATE AND ACETAMINOPHEN 5; 325 MG/1; MG/1
1 TABLET ORAL EVERY 8 HOURS PRN
Qty: 21 TABLET | Refills: 0 | Status: SHIPPED | OUTPATIENT
Start: 2022-02-09 | End: 2022-02-16

## 2022-02-09 NOTE — TELEPHONE ENCOUNTER
----- Message from Kezia Giang sent at 2/8/2022  4:32 PM CST -----  [phone]-Qveomj call him about scheduling surgery or he needs pain meds

## 2022-02-11 ENCOUNTER — TELEPHONE (OUTPATIENT)
Dept: ORTHOPEDICS | Facility: CLINIC | Age: 52
End: 2022-02-11
Payer: MEDICAID

## 2022-02-11 DIAGNOSIS — M50.30 DDD (DEGENERATIVE DISC DISEASE), CERVICAL: Primary | ICD-10-CM

## 2022-02-11 DIAGNOSIS — M47.812 ARTHROPATHY OF CERVICAL FACET JOINT: ICD-10-CM

## 2022-02-11 DIAGNOSIS — M50.30 DEGENERATIVE DISC DISEASE, CERVICAL: ICD-10-CM

## 2022-02-11 NOTE — TELEPHONE ENCOUNTER
----- Message from Kezia Giang sent at 2/10/2022 10:55 AM CST -----  [phone]-He is ready to schedule his surgery, in severe pain, please call

## 2022-02-16 ENCOUNTER — TELEPHONE (OUTPATIENT)
Dept: ORTHOPEDICS | Facility: CLINIC | Age: 52
End: 2022-02-16
Payer: MEDICAID

## 2022-02-16 NOTE — TELEPHONE ENCOUNTER
Spoke with the patient today regarding his neck pain. Patient states pain is severe and he would like to proceed with scheduling surgery. He has remained smoke free since January 10, 2022, and has agreed to remain smoke free following surgery for 4 weeks.

## 2022-02-21 ENCOUNTER — TELEPHONE (OUTPATIENT)
Dept: ORTHOPEDICS | Facility: CLINIC | Age: 52
End: 2022-02-21
Payer: MEDICAID

## 2022-02-21 ENCOUNTER — ANESTHESIA EVENT (OUTPATIENT)
Dept: SURGERY | Facility: HOSPITAL | Age: 52
End: 2022-02-21
Payer: COMMERCIAL

## 2022-02-21 NOTE — TELEPHONE ENCOUNTER
----- Message from Radha Mario sent at 2/21/2022 12:33 PM CST -----  Contact: Zenaida from [employer]  She was calling regarding this patient and requested a call back at [phone] and use case # [id number].

## 2022-02-22 ENCOUNTER — HOSPITAL ENCOUNTER (OUTPATIENT)
Dept: RADIOLOGY | Facility: HOSPITAL | Age: 52
Discharge: HOME | End: 2022-02-22
Attending: ORTHOPAEDIC SURGERY
Payer: COMMERCIAL

## 2022-02-22 ENCOUNTER — HOSPITAL ENCOUNTER (OUTPATIENT)
Dept: PREADMISSION TESTING | Facility: HOSPITAL | Age: 52
Discharge: HOME | End: 2022-02-22
Attending: ORTHOPAEDIC SURGERY
Payer: COMMERCIAL

## 2022-02-22 VITALS
RESPIRATION RATE: 14 BRPM | BODY MASS INDEX: 20.14 KG/M2 | SYSTOLIC BLOOD PRESSURE: 140 MMHG | HEART RATE: 84 BPM | WEIGHT: 165.38 LBS | DIASTOLIC BLOOD PRESSURE: 88 MMHG | TEMPERATURE: 98 F | OXYGEN SATURATION: 98 % | HEIGHT: 76 IN

## 2022-02-22 DIAGNOSIS — Z01.818 PRE-OP TESTING: Primary | ICD-10-CM

## 2022-02-22 DIAGNOSIS — M47.812 ARTHROPATHY OF CERVICAL FACET JOINT: ICD-10-CM

## 2022-02-22 DIAGNOSIS — M50.30 DDD (DEGENERATIVE DISC DISEASE), CERVICAL: ICD-10-CM

## 2022-02-22 DIAGNOSIS — Z01.818 PRE-OP TESTING: ICD-10-CM

## 2022-02-22 LAB — SARS-COV-2 RDRP RESP QL NAA+PROBE: NEGATIVE

## 2022-02-22 PROCEDURE — 71046 X-RAY EXAM CHEST 2 VIEWS: CPT | Mod: TC

## 2022-02-22 PROCEDURE — 93005 ELECTROCARDIOGRAM TRACING: CPT | Performed by: INTERNAL MEDICINE

## 2022-02-22 PROCEDURE — 93010 ELECTROCARDIOGRAM REPORT: CPT | Mod: ,,, | Performed by: INTERNAL MEDICINE

## 2022-02-22 PROCEDURE — U0002 COVID-19 LAB TEST NON-CDC: HCPCS | Performed by: ORTHOPAEDIC SURGERY

## 2022-02-22 PROCEDURE — 93010 EKG 12-LEAD: ICD-10-PCS | Mod: ,,, | Performed by: INTERNAL MEDICINE

## 2022-02-22 RX ORDER — HYDROCODONE BITARTRATE AND ACETAMINOPHEN 5; 325 MG/1; MG/1
1 TABLET ORAL EVERY 6 HOURS PRN
Status: ON HOLD | COMMUNITY
End: 2022-02-24 | Stop reason: HOSPADM

## 2022-02-24 ENCOUNTER — ANESTHESIA (OUTPATIENT)
Dept: SURGERY | Facility: HOSPITAL | Age: 52
End: 2022-02-24
Payer: COMMERCIAL

## 2022-02-24 ENCOUNTER — HOSPITAL ENCOUNTER (OUTPATIENT)
Facility: HOSPITAL | Age: 52
Discharge: HOME | End: 2022-02-25
Attending: ORTHOPAEDIC SURGERY | Admitting: ORTHOPAEDIC SURGERY
Payer: MEDICAID

## 2022-02-24 DIAGNOSIS — M50.30 DDD (DEGENERATIVE DISC DISEASE), CERVICAL: ICD-10-CM

## 2022-02-24 DIAGNOSIS — M50.30 DEGENERATIVE DISC DISEASE, CERVICAL: ICD-10-CM

## 2022-02-24 DIAGNOSIS — L29.9 PRURITUS: ICD-10-CM

## 2022-02-24 DIAGNOSIS — Z01.818 PREOP TESTING: Primary | ICD-10-CM

## 2022-02-24 DIAGNOSIS — M47.812 ARTHROPATHY OF CERVICAL FACET JOINT: ICD-10-CM

## 2022-02-24 LAB
ANION GAP SERPL CALC-SCNC: 11 MMOL/L (ref 8–16)
ANION GAP SERPL CALC-SCNC: 12 MMOL/L (ref 8–16)
BASOPHILS # BLD AUTO: 0.03 K/UL (ref 0–0.2)
BASOPHILS NFR BLD: 0.8 % (ref 0–1.9)
BUN SERPL-MCNC: <5 MG/DL (ref 6–20)
BUN SERPL-MCNC: <5 MG/DL (ref 6–20)
CALCIUM SERPL-MCNC: 8.2 MG/DL (ref 8.7–10.5)
CALCIUM SERPL-MCNC: 8.5 MG/DL (ref 8.7–10.5)
CHLORIDE SERPL-SCNC: 91 MMOL/L (ref 95–110)
CHLORIDE SERPL-SCNC: 91 MMOL/L (ref 95–110)
CO2 SERPL-SCNC: 24 MMOL/L (ref 23–29)
CO2 SERPL-SCNC: 24 MMOL/L (ref 23–29)
CREAT SERPL-MCNC: 0.6 MG/DL (ref 0.5–1.4)
CREAT SERPL-MCNC: 0.6 MG/DL (ref 0.5–1.4)
DIFFERENTIAL METHOD: ABNORMAL
EOSINOPHIL # BLD AUTO: 0 K/UL (ref 0–0.5)
EOSINOPHIL NFR BLD: 0.5 % (ref 0–8)
ERYTHROCYTE [DISTWIDTH] IN BLOOD BY AUTOMATED COUNT: 12.9 % (ref 11.5–14.5)
EST. GFR  (AFRICAN AMERICAN): >60 ML/MIN/1.73 M^2
EST. GFR  (AFRICAN AMERICAN): >60 ML/MIN/1.73 M^2
EST. GFR  (NON AFRICAN AMERICAN): >60 ML/MIN/1.73 M^2
EST. GFR  (NON AFRICAN AMERICAN): >60 ML/MIN/1.73 M^2
GLUCOSE SERPL-MCNC: 84 MG/DL (ref 70–110)
GLUCOSE SERPL-MCNC: 87 MG/DL (ref 70–110)
HCT VFR BLD AUTO: 35.9 % (ref 40–54)
HGB BLD-MCNC: 12.8 G/DL (ref 14–18)
IMM GRANULOCYTES # BLD AUTO: 0.06 K/UL (ref 0–0.04)
IMM GRANULOCYTES NFR BLD AUTO: 1.6 % (ref 0–0.5)
LYMPHOCYTES # BLD AUTO: 1.4 K/UL (ref 1–4.8)
LYMPHOCYTES NFR BLD: 35.3 % (ref 18–48)
MCH RBC QN AUTO: 34 PG (ref 27–31)
MCHC RBC AUTO-ENTMCNC: 35.7 G/DL (ref 32–36)
MCV RBC AUTO: 96 FL (ref 82–98)
MONOCYTES # BLD AUTO: 0.3 K/UL (ref 0.3–1)
MONOCYTES NFR BLD: 7.1 % (ref 4–15)
NEUTROPHILS # BLD AUTO: 2.1 K/UL (ref 1.8–7.7)
NEUTROPHILS NFR BLD: 54.7 % (ref 38–73)
NRBC BLD-RTO: 0 /100 WBC
PLATELET # BLD AUTO: 110 K/UL (ref 150–450)
PMV BLD AUTO: 9.8 FL (ref 9.2–12.9)
POTASSIUM SERPL-SCNC: 3.7 MMOL/L (ref 3.5–5.1)
POTASSIUM SERPL-SCNC: 3.7 MMOL/L (ref 3.5–5.1)
RBC # BLD AUTO: 3.76 M/UL (ref 4.6–6.2)
SODIUM SERPL-SCNC: 126 MMOL/L (ref 136–145)
SODIUM SERPL-SCNC: 127 MMOL/L (ref 136–145)
WBC # BLD AUTO: 3.82 K/UL (ref 3.9–12.7)

## 2022-02-24 PROCEDURE — 63600175 PHARM REV CODE 636 W HCPCS: Performed by: ANESTHESIOLOGY

## 2022-02-24 PROCEDURE — 25000003 PHARM REV CODE 250: Performed by: ORTHOPAEDIC SURGERY

## 2022-02-24 PROCEDURE — 36000710: Performed by: ORTHOPAEDIC SURGERY

## 2022-02-24 PROCEDURE — 37000009 HC ANESTHESIA EA ADD 15 MINS: Performed by: ORTHOPAEDIC SURGERY

## 2022-02-24 PROCEDURE — 27000656 HC EYE GOGGLES: Performed by: ANESTHESIOLOGY

## 2022-02-24 PROCEDURE — 63600175 PHARM REV CODE 636 W HCPCS: Performed by: NURSE ANESTHETIST, CERTIFIED REGISTERED

## 2022-02-24 PROCEDURE — 27100025 HC TUBING, SET FLUID WARMER: Performed by: ANESTHESIOLOGY

## 2022-02-24 PROCEDURE — 25000003 PHARM REV CODE 250: Performed by: ANESTHESIOLOGY

## 2022-02-24 PROCEDURE — 96375 TX/PRO/DX INJ NEW DRUG ADDON: CPT | Mod: 59

## 2022-02-24 PROCEDURE — 25000003 PHARM REV CODE 250: Performed by: NURSE ANESTHETIST, CERTIFIED REGISTERED

## 2022-02-24 PROCEDURE — 85025 COMPLETE CBC W/AUTO DIFF WBC: CPT | Performed by: ORTHOPAEDIC SURGERY

## 2022-02-24 PROCEDURE — 27000671 HC TUBING MICROBORE EXT: Performed by: ANESTHESIOLOGY

## 2022-02-24 PROCEDURE — 27000673 HC TUBING BLOOD Y: Performed by: ANESTHESIOLOGY

## 2022-02-24 PROCEDURE — G0378 HOSPITAL OBSERVATION PER HR: HCPCS

## 2022-02-24 PROCEDURE — 27200677 HC TRANSDUCER MONITOR KIT SINGLE: Performed by: ANESTHESIOLOGY

## 2022-02-24 PROCEDURE — 27000654 HC CATH IV JELCO: Performed by: ANESTHESIOLOGY

## 2022-02-24 PROCEDURE — C1713 ANCHOR/SCREW BN/BN,TIS/BN: HCPCS | Performed by: ORTHOPAEDIC SURGERY

## 2022-02-24 PROCEDURE — 96374 THER/PROPH/DIAG INJ IV PUSH: CPT

## 2022-02-24 PROCEDURE — 99900031 HC PATIENT EDUCATION (STAT)

## 2022-02-24 PROCEDURE — 36000711: Performed by: ORTHOPAEDIC SURGERY

## 2022-02-24 PROCEDURE — 71000033 HC RECOVERY, INTIAL HOUR: Performed by: ORTHOPAEDIC SURGERY

## 2022-02-24 PROCEDURE — 71000039 HC RECOVERY, EACH ADD'L HOUR: Performed by: ORTHOPAEDIC SURGERY

## 2022-02-24 PROCEDURE — 27000658 HC ARTERIAL LINE ALL: Performed by: ANESTHESIOLOGY

## 2022-02-24 PROCEDURE — 27201107 HC STYLET, STANDARD: Performed by: ANESTHESIOLOGY

## 2022-02-24 PROCEDURE — 27201423 OPTIME MED/SURG SUP & DEVICES STERILE SUPPLY: Performed by: ORTHOPAEDIC SURGERY

## 2022-02-24 PROCEDURE — 27000675 HC TUBING MICRODRIP: Performed by: ANESTHESIOLOGY

## 2022-02-24 PROCEDURE — 80048 BASIC METABOLIC PNL TOTAL CA: CPT | Mod: 91 | Performed by: ORTHOPAEDIC SURGERY

## 2022-02-24 PROCEDURE — 27202105 HC BIS BILATERAL SENSOR: Performed by: ANESTHESIOLOGY

## 2022-02-24 PROCEDURE — 63600175 PHARM REV CODE 636 W HCPCS: Performed by: ORTHOPAEDIC SURGERY

## 2022-02-24 PROCEDURE — 37000008 HC ANESTHESIA 1ST 15 MINUTES: Performed by: ORTHOPAEDIC SURGERY

## 2022-02-24 DEVICE — PUTTY DBX 5CC 038050: Type: IMPLANTABLE DEVICE | Site: NECK | Status: FUNCTIONAL

## 2022-02-24 DEVICE — IMPLANTABLE DEVICE: Type: IMPLANTABLE DEVICE | Site: NECK | Status: FUNCTIONAL

## 2022-02-24 RX ORDER — ONDANSETRON 2 MG/ML
INJECTION INTRAMUSCULAR; INTRAVENOUS
Status: DISCONTINUED | OUTPATIENT
Start: 2022-02-24 | End: 2022-02-24

## 2022-02-24 RX ORDER — HYDROMORPHONE HYDROCHLORIDE 1 MG/ML
INJECTION, SOLUTION INTRAMUSCULAR; INTRAVENOUS; SUBCUTANEOUS
Status: DISPENSED
Start: 2022-02-24 | End: 2022-02-24

## 2022-02-24 RX ORDER — AMOXICILLIN 250 MG
2 CAPSULE ORAL NIGHTLY PRN
Status: DISCONTINUED | OUTPATIENT
Start: 2022-02-24 | End: 2022-02-25 | Stop reason: HOSPADM

## 2022-02-24 RX ORDER — PROPOFOL 10 MG/ML
VIAL (ML) INTRAVENOUS
Status: DISCONTINUED | OUTPATIENT
Start: 2022-02-24 | End: 2022-02-24

## 2022-02-24 RX ORDER — TALC
9 POWDER (GRAM) TOPICAL NIGHTLY PRN
Status: DISCONTINUED | OUTPATIENT
Start: 2022-02-24 | End: 2022-02-25 | Stop reason: HOSPADM

## 2022-02-24 RX ORDER — HYDROMORPHONE HYDROCHLORIDE 1 MG/ML
2 INJECTION, SOLUTION INTRAMUSCULAR; INTRAVENOUS; SUBCUTANEOUS
Status: DISCONTINUED | OUTPATIENT
Start: 2022-02-24 | End: 2022-02-25 | Stop reason: HOSPADM

## 2022-02-24 RX ORDER — ACETAMINOPHEN 10 MG/ML
INJECTION, SOLUTION INTRAVENOUS
Status: DISCONTINUED | OUTPATIENT
Start: 2022-02-24 | End: 2022-02-24

## 2022-02-24 RX ORDER — ONDANSETRON 2 MG/ML
4 INJECTION INTRAMUSCULAR; INTRAVENOUS DAILY PRN
Status: DISCONTINUED | OUTPATIENT
Start: 2022-02-24 | End: 2022-02-24 | Stop reason: HOSPADM

## 2022-02-24 RX ORDER — SUCCINYLCHOLINE CHLORIDE 20 MG/ML
INJECTION INTRAMUSCULAR; INTRAVENOUS
Status: DISCONTINUED | OUTPATIENT
Start: 2022-02-24 | End: 2022-02-24

## 2022-02-24 RX ORDER — MEPERIDINE HYDROCHLORIDE 50 MG/ML
12.5 INJECTION INTRAMUSCULAR; INTRAVENOUS; SUBCUTANEOUS EVERY 10 MIN PRN
Status: DISCONTINUED | OUTPATIENT
Start: 2022-02-24 | End: 2022-02-24 | Stop reason: HOSPADM

## 2022-02-24 RX ORDER — SODIUM CHLORIDE 0.9 % (FLUSH) 0.9 %
10 SYRINGE (ML) INJECTION
Status: DISCONTINUED | OUTPATIENT
Start: 2022-02-24 | End: 2022-02-25 | Stop reason: HOSPADM

## 2022-02-24 RX ORDER — DIPHENHYDRAMINE HYDROCHLORIDE 50 MG/ML
12.5 INJECTION INTRAMUSCULAR; INTRAVENOUS
Status: DISCONTINUED | OUTPATIENT
Start: 2022-02-24 | End: 2022-02-24 | Stop reason: HOSPADM

## 2022-02-24 RX ORDER — PROCHLORPERAZINE EDISYLATE 5 MG/ML
5 INJECTION INTRAMUSCULAR; INTRAVENOUS EVERY 6 HOURS PRN
Status: DISCONTINUED | OUTPATIENT
Start: 2022-02-24 | End: 2022-02-25 | Stop reason: HOSPADM

## 2022-02-24 RX ORDER — GABAPENTIN 300 MG/1
300 CAPSULE ORAL ONCE
Status: COMPLETED | OUTPATIENT
Start: 2022-02-24 | End: 2022-02-24

## 2022-02-24 RX ORDER — CEFAZOLIN SODIUM 2 G/50ML
2 SOLUTION INTRAVENOUS
Status: COMPLETED | OUTPATIENT
Start: 2022-02-24 | End: 2022-02-25

## 2022-02-24 RX ORDER — OXYCODONE HYDROCHLORIDE 5 MG/1
10 TABLET ORAL EVERY 4 HOURS PRN
Status: DISCONTINUED | OUTPATIENT
Start: 2022-02-24 | End: 2022-02-25

## 2022-02-24 RX ORDER — DEXAMETHASONE SODIUM PHOSPHATE 4 MG/ML
INJECTION, SOLUTION INTRA-ARTICULAR; INTRALESIONAL; INTRAMUSCULAR; INTRAVENOUS; SOFT TISSUE
Status: DISCONTINUED | OUTPATIENT
Start: 2022-02-24 | End: 2022-02-24

## 2022-02-24 RX ORDER — ACETAMINOPHEN 325 MG/1
650 TABLET ORAL EVERY 6 HOURS PRN
Status: DISCONTINUED | OUTPATIENT
Start: 2022-02-25 | End: 2022-02-25

## 2022-02-24 RX ORDER — HYDROMORPHONE HYDROCHLORIDE 1 MG/ML
0.2 INJECTION, SOLUTION INTRAMUSCULAR; INTRAVENOUS; SUBCUTANEOUS EVERY 5 MIN PRN
Status: DISCONTINUED | OUTPATIENT
Start: 2022-02-24 | End: 2022-02-24 | Stop reason: HOSPADM

## 2022-02-24 RX ORDER — DULOXETIN HYDROCHLORIDE 30 MG/1
60 CAPSULE, DELAYED RELEASE ORAL DAILY
Status: CANCELLED | OUTPATIENT
Start: 2022-02-24

## 2022-02-24 RX ORDER — BISACODYL 10 MG
10 SUPPOSITORY, RECTAL RECTAL DAILY
Status: DISCONTINUED | OUTPATIENT
Start: 2022-02-24 | End: 2022-02-25 | Stop reason: HOSPADM

## 2022-02-24 RX ORDER — OXYCODONE HYDROCHLORIDE 5 MG/1
15 TABLET ORAL EVERY 4 HOURS PRN
Status: DISCONTINUED | OUTPATIENT
Start: 2022-02-24 | End: 2022-02-25

## 2022-02-24 RX ORDER — SODIUM CHLORIDE, SODIUM LACTATE, POTASSIUM CHLORIDE, CALCIUM CHLORIDE 600; 310; 30; 20 MG/100ML; MG/100ML; MG/100ML; MG/100ML
INJECTION, SOLUTION INTRAVENOUS CONTINUOUS
Status: DISCONTINUED | OUTPATIENT
Start: 2022-02-24 | End: 2022-02-24

## 2022-02-24 RX ORDER — HYDROMORPHONE HYDROCHLORIDE 1 MG/ML
1 INJECTION, SOLUTION INTRAMUSCULAR; INTRAVENOUS; SUBCUTANEOUS
Status: DISCONTINUED | OUTPATIENT
Start: 2022-02-24 | End: 2022-02-25 | Stop reason: HOSPADM

## 2022-02-24 RX ORDER — DOCUSATE SODIUM 100 MG/1
100 CAPSULE, LIQUID FILLED ORAL DAILY
Status: CANCELLED | OUTPATIENT
Start: 2022-02-24

## 2022-02-24 RX ORDER — OXYCODONE HYDROCHLORIDE 5 MG/1
5 TABLET ORAL
Status: DISCONTINUED | OUTPATIENT
Start: 2022-02-24 | End: 2022-02-24 | Stop reason: HOSPADM

## 2022-02-24 RX ORDER — ONDANSETRON 4 MG/1
8 TABLET, ORALLY DISINTEGRATING ORAL EVERY 6 HOURS PRN
Status: CANCELLED | OUTPATIENT
Start: 2022-02-24

## 2022-02-24 RX ORDER — PHENYLEPHRINE HYDROCHLORIDE 10 MG/ML
INJECTION INTRAVENOUS
Status: DISCONTINUED | OUTPATIENT
Start: 2022-02-24 | End: 2022-02-24

## 2022-02-24 RX ORDER — OXYCODONE AND ACETAMINOPHEN 10; 325 MG/1; MG/1
1 TABLET ORAL EVERY 4 HOURS PRN
Qty: 40 TABLET | Refills: 0 | Status: SHIPPED | OUTPATIENT
Start: 2022-02-24 | End: 2022-02-25 | Stop reason: SDUPTHER

## 2022-02-24 RX ORDER — FENTANYL CITRATE 50 UG/ML
INJECTION, SOLUTION INTRAMUSCULAR; INTRAVENOUS
Status: DISCONTINUED | OUTPATIENT
Start: 2022-02-24 | End: 2022-02-24

## 2022-02-24 RX ORDER — ROCURONIUM BROMIDE 10 MG/ML
INJECTION, SOLUTION INTRAVENOUS
Status: DISCONTINUED | OUTPATIENT
Start: 2022-02-24 | End: 2022-02-24

## 2022-02-24 RX ORDER — CEFAZOLIN SODIUM 1 G/3ML
INJECTION, POWDER, FOR SOLUTION INTRAMUSCULAR; INTRAVENOUS
Status: DISCONTINUED | OUTPATIENT
Start: 2022-02-24 | End: 2022-02-24

## 2022-02-24 RX ORDER — MAG HYDROX/ALUMINUM HYD/SIMETH 200-200-20
30 SUSPENSION, ORAL (FINAL DOSE FORM) ORAL EVERY 4 HOURS PRN
Status: DISCONTINUED | OUTPATIENT
Start: 2022-02-24 | End: 2022-02-25 | Stop reason: HOSPADM

## 2022-02-24 RX ORDER — CEFAZOLIN SODIUM 2 G/50ML
2 SOLUTION INTRAVENOUS
Status: DISCONTINUED | OUTPATIENT
Start: 2022-02-24 | End: 2022-02-24 | Stop reason: HOSPADM

## 2022-02-24 RX ORDER — MIDAZOLAM HYDROCHLORIDE 5 MG/ML
INJECTION INTRAMUSCULAR; INTRAVENOUS
Status: DISCONTINUED | OUTPATIENT
Start: 2022-02-24 | End: 2022-02-24

## 2022-02-24 RX ORDER — LIDOCAINE HCL/PF 100 MG/5ML
SYRINGE (ML) INTRAVENOUS
Status: DISCONTINUED | OUTPATIENT
Start: 2022-02-24 | End: 2022-02-24

## 2022-02-24 RX ORDER — ONDANSETRON 2 MG/ML
4 INJECTION INTRAMUSCULAR; INTRAVENOUS EVERY 6 HOURS PRN
Status: DISCONTINUED | OUTPATIENT
Start: 2022-02-24 | End: 2022-02-25 | Stop reason: HOSPADM

## 2022-02-24 RX ORDER — MUPIROCIN 20 MG/G
1 OINTMENT TOPICAL 2 TIMES DAILY
Status: DISCONTINUED | OUTPATIENT
Start: 2022-02-24 | End: 2022-02-25 | Stop reason: HOSPADM

## 2022-02-24 RX ORDER — DIPHENHYDRAMINE HCL 25 MG
50 CAPSULE ORAL EVERY 6 HOURS PRN
Status: CANCELLED | OUTPATIENT
Start: 2022-02-24

## 2022-02-24 RX ORDER — OXYCODONE HYDROCHLORIDE 5 MG/1
5 TABLET ORAL EVERY 4 HOURS PRN
Status: DISCONTINUED | OUTPATIENT
Start: 2022-02-24 | End: 2022-02-25

## 2022-02-24 RX ORDER — KETAMINE HYDROCHLORIDE 50 MG/ML
INJECTION, SOLUTION INTRAMUSCULAR; INTRAVENOUS
Status: DISCONTINUED | OUTPATIENT
Start: 2022-02-24 | End: 2022-02-24

## 2022-02-24 RX ORDER — TAMSULOSIN HYDROCHLORIDE 0.4 MG/1
0.8 CAPSULE ORAL DAILY
Status: CANCELLED | OUTPATIENT
Start: 2022-02-24

## 2022-02-24 RX ORDER — FAMOTIDINE 10 MG/ML
INJECTION INTRAVENOUS
Status: DISCONTINUED | OUTPATIENT
Start: 2022-02-24 | End: 2022-02-24

## 2022-02-24 RX ADMIN — PROPOFOL 55 MCG/KG/MIN: 10 INJECTION, EMULSION INTRAVENOUS at 07:02

## 2022-02-24 RX ADMIN — DEXTROSE 2 G: 50 INJECTION, SOLUTION INTRAVENOUS at 03:02

## 2022-02-24 RX ADMIN — ROCURONIUM BROMIDE 5 MG: 10 INJECTION, SOLUTION INTRAVENOUS at 07:02

## 2022-02-24 RX ADMIN — FENTANYL CITRATE 50 MCG: 50 INJECTION INTRAMUSCULAR; INTRAVENOUS at 07:02

## 2022-02-24 RX ADMIN — DEXAMETHASONE SODIUM PHOSPHATE 8 MG: 4 INJECTION, SOLUTION INTRAMUSCULAR; INTRAVENOUS at 07:02

## 2022-02-24 RX ADMIN — HYDROMORPHONE HYDROCHLORIDE 1 MG: 1 INJECTION, SOLUTION INTRAMUSCULAR; INTRAVENOUS; SUBCUTANEOUS at 02:02

## 2022-02-24 RX ADMIN — PHENYLEPHRINE HYDROCHLORIDE 100 MCG: 10 INJECTION INTRAVENOUS at 08:02

## 2022-02-24 RX ADMIN — KETAMINE HYDROCHLORIDE 30 MG: 50 INJECTION INTRAMUSCULAR; INTRAVENOUS at 07:02

## 2022-02-24 RX ADMIN — OXYCODONE HYDROCHLORIDE 10 MG: 5 TABLET ORAL at 09:02

## 2022-02-24 RX ADMIN — LIDOCAINE HYDROCHLORIDE 100 MG: 20 INJECTION, SOLUTION INTRAVENOUS at 07:02

## 2022-02-24 RX ADMIN — DEXTROSE 2 G: 50 INJECTION, SOLUTION INTRAVENOUS at 09:02

## 2022-02-24 RX ADMIN — SODIUM CHLORIDE, SODIUM LACTATE, POTASSIUM CHLORIDE, AND CALCIUM CHLORIDE: .6; .31; .03; .02 INJECTION, SOLUTION INTRAVENOUS at 02:02

## 2022-02-24 RX ADMIN — HYDROMORPHONE HYDROCHLORIDE 0.2 MG: 1 INJECTION, SOLUTION INTRAMUSCULAR; INTRAVENOUS; SUBCUTANEOUS at 09:02

## 2022-02-24 RX ADMIN — FAMOTIDINE 20 MG: 10 INJECTION, SOLUTION INTRAVENOUS at 07:02

## 2022-02-24 RX ADMIN — CEFAZOLIN 2 G: 330 INJECTION, POWDER, FOR SOLUTION INTRAMUSCULAR; INTRAVENOUS at 07:02

## 2022-02-24 RX ADMIN — HYDROMORPHONE HYDROCHLORIDE 0.2 MG: 1 INJECTION, SOLUTION INTRAMUSCULAR; INTRAVENOUS; SUBCUTANEOUS at 10:02

## 2022-02-24 RX ADMIN — OXYCODONE HYDROCHLORIDE 5 MG: 5 TABLET ORAL at 01:02

## 2022-02-24 RX ADMIN — GABAPENTIN 300 MG: 300 CAPSULE ORAL at 06:02

## 2022-02-24 RX ADMIN — ONDANSETRON 4 MG: 2 INJECTION INTRAMUSCULAR; INTRAVENOUS at 05:02

## 2022-02-24 RX ADMIN — PHENYLEPHRINE HYDROCHLORIDE 200 MCG: 10 INJECTION INTRAVENOUS at 07:02

## 2022-02-24 RX ADMIN — ONDANSETRON 4 MG: 2 INJECTION INTRAMUSCULAR; INTRAVENOUS at 07:02

## 2022-02-24 RX ADMIN — PROPOFOL 100 MG: 10 INJECTION, EMULSION INTRAVENOUS at 07:02

## 2022-02-24 RX ADMIN — MIDAZOLAM HYDROCHLORIDE 2 MG: 5 INJECTION, SOLUTION INTRAMUSCULAR; INTRAVENOUS at 07:02

## 2022-02-24 RX ADMIN — SUCCINYLCHOLINE CHLORIDE 140 MG: 20 INJECTION, SOLUTION INTRAMUSCULAR; INTRAVENOUS at 07:02

## 2022-02-24 RX ADMIN — SODIUM CHLORIDE: 900 INJECTION, SOLUTION INTRAVENOUS at 08:02

## 2022-02-24 RX ADMIN — FENTANYL CITRATE 50 MCG: 50 INJECTION INTRAMUSCULAR; INTRAVENOUS at 08:02

## 2022-02-24 RX ADMIN — ACETAMINOPHEN 1000 MG: 10 INJECTION, SOLUTION INTRAVENOUS at 07:02

## 2022-02-24 RX ADMIN — SODIUM CHLORIDE, SODIUM LACTATE, POTASSIUM CHLORIDE, AND CALCIUM CHLORIDE: .6; .31; .03; .02 INJECTION, SOLUTION INTRAVENOUS at 06:02

## 2022-02-24 NOTE — NURSING
Patient arrived on unit via bed in stable condition. Neck brace in place. NAD noted. Call light within reach. Will continue to monitor.

## 2022-02-24 NOTE — PLAN OF CARE
Medicare Outpatient Observation Notice was signed, explained and given to patient/caregiver on 02/24/2022 at 2:30pm     addressed any questions or concerns.    Medicare Outpatient Observation Notice will be scanned into patient's medical record

## 2022-02-24 NOTE — ANESTHESIA PREPROCEDURE EVALUATION
02/24/2022  Josh Coulter is a 51 y.o., male.      Patient Active Problem List   Diagnosis    Spondylolisthesis of lumbar region    Erectile dysfunction    Lumbar stenosis with neurogenic claudication    History of alcohol abuse    Absolute anemia    Hyponatremia    Snoring    Tobacco use    Neck pain    Arm weakness    History of lumbar spinal fusion       Past Surgical History:   Procedure Laterality Date    ANTERIOR LUMBAR INTERBODY FUSION (ALIF) N/A 3/31/2020    Procedure: FUSION, SPINE, LUMBAR, ALIF L4-5;  Surgeon: Cholo Mena MD;  Location: [facility] OR;  Service: Orthopedics;  Laterality: N/A;    BONE GRAFT N/A 3/31/2020    Procedure: BONE GRAFT;  Surgeon: Cholo Mena MD;  Location: [facility] OR;  Service: Orthopedics;  Laterality: N/A;    FUSION OF SPINE WITH INSTRUMENTATION N/A 3/31/2020    Procedure: FUSION, SPINE, WITH INSTRUMENTATION L4-5;  Surgeon: Cholo Mena MD;  Location: [facility] OR;  Service: Orthopedics;  Laterality: N/A;  [employer], [employer], DR DAMON, CO-SURGEON    HERNIA REPAIR      REMOVAL OF HARDWARE FROM SPINE N/A 2/11/2021    Procedure: REMOVAL, HARDWARE, SPINE L4-5;  Surgeon: Cholo Mena MD;  Location: [facility] OR;  Service: Orthopedics;  Laterality: N/A;  [employer]    SPINE SURGERY  2009        Tobacco Use:  The patient  reports that he has been smoking cigarettes. He has a 19.00 pack-year smoking history. He has never used smokeless tobacco.     Results for orders placed or performed during the hospital encounter of 02/22/22   EKG 12-lead    Collection Time: 02/22/22 10:35 AM    Narrative    Test Reason : Z01.818,Z01.818,    Vent. Rate : 097 BPM     Atrial Rate : 097 BPM     P-R Int : 172 ms          QRS Dur : 112 ms      QT Int : 350 ms       P-R-T Axes : 082 -82 083 degrees     QTc Int : 444 ms    Normal sinus rhythm  Left axis deviation  Incomplete right bundle  branch block  Abnormal ECG  When compared with ECG of 25-FEB-2021 13:47,  Incomplete right bundle branch block is now Present    Referred By:  LUCY           Confirmed By:              Lab Results   Component Value Date    WBC 3.69 (L) 02/22/2022    HGB 15.1 02/22/2022    HCT 42.1 02/22/2022    MCV 95 02/22/2022     (L) 02/22/2022     BMP  Lab Results   Component Value Date     (L) 02/22/2022    K 4.0 02/22/2022    CL 87 (L) 02/22/2022    CO2 28 02/22/2022    BUN 5 (L) 02/22/2022    CREATININE 0.7 02/22/2022    CALCIUM 8.5 (L) 02/22/2022    ANIONGAP 10 02/22/2022    GLU 97 02/22/2022    GLU 87 06/23/2021    GLU 84 02/25/2021       Results for orders placed in visit on 04/07/21    Echo Color Flow Doppler? Yes    Interpretation Summary  · The estimated PA systolic pressure is 25 mmHg.  · The left ventricle is normal in size with concentric remodeling and normal systolic function.  · The estimated ejection fraction is 65%.  · Normal left ventricular diastolic function.  · Normal right ventricular size with normal right ventricular systolic function.  · Mild left atrial enlargement.  · Normal central venous pressure (3 mmHg).  · Mild pulmonic regurgitation.  · Mild tricuspid regurgitation.  · Mild-to-moderate mitral regurgitation.        Pre-op Assessment    I have reviewed the Patient Summary Reports.     I have reviewed the Nursing Notes. I have reviewed the NPO Status.   I have reviewed the Medications.     Review of Systems  Anesthesia Hx:  No problems with previous Anesthesia  Denies Family Hx of Anesthesia complications.   Denies Personal Hx of Anesthesia complications.   Social:  Alcohol Use, Smoker  Illicit Drug Use: Types of drugs include Marijuana,   Hematology/Oncology:  Hematology Normal   Oncology Normal     EENT/Dental:EENT/Dental Normal   Cardiovascular:   Valvular problems/Murmurs, MR ECG has been reviewed. Syncope  Patient with no cardiologist at this time    Pulmonary:  Pulmonary Normal     Renal/:   Chronic Hyponatremia without Nephrologist evaluation of follow up    Hepatic/GI:   GERD, well controlled    Musculoskeletal:   Arthritis  Spondylolisthesis of lumbar region  Lumbar stenosis with neurogenic claudication  History of lumbar spinal fusion  Arm weakness Spine Disorders: cervical and lumbar Degenerative disease, Disc disease and Chronic Pain    Neurological:   Neuromuscular Disease, Bilateral upper & lower extremity  Weakness   Patient walks with use of a nirav   Syncope  Peripheral Neuropathy    Endocrine:  Endocrine Normal    Dermatological:  Skin Normal    Psych:  Psychiatric Normal           Physical Exam  General: Well nourished, Cooperative, Alert and Oriented    Airway:  Mallampati: III   Mouth Opening: Normal  TM Distance: Normal  Tongue: Normal  Neck ROM: Extension Decreased    Dental:  Intact    Chest/Lungs:  Clear to auscultation, Normal Respiratory Rate    Heart:  Rate: Normal  Rhythm: Regular Rhythm  Sounds: Normal        Anesthesia Plan  Type of Anesthesia, risks & benefits discussed:    Anesthesia Type: Gen ETT  Intra-op Monitoring Plan: Standard ASA Monitors and Art Line  Post Op Pain Control Plan: multimodal analgesia and IV/PO Opioids PRN  Induction:  IV  Airway Plan: Video, Post-Induction  Informed Consent: Informed consent signed with the Patient and all parties understand the risks and agree with anesthesia plan.  All questions answered.   ASA Score: 3  Anesthesia Plan Notes:   GETA  Video assisted laryngoscopy   Radial artery catheter   Benadryl 6.25 mg iv  Decadron 8 mg iv   Zofran 4 mg iv  Pepcid 20 mg iv   Ofirmev 1000 mg iv  Toradol 15 mg iv  Neurontin 300 mg po   Ketamine   Sugammadex  If needed     Ready For Surgery From Anesthesia Perspective.     .

## 2022-02-24 NOTE — OP NOTE
[facility]  Orthopedic  Operative Note    SUMMARY     Date of Procedure: 2/24/2022     Procedure:   1. Anterior cervical diskectomy fusion C5-6 CPT code 00552  2. Anterior cervical diskectomy fusion C6-7 CPT code 96270  3. Insertion interbody device C5-6 CPT code 51620  4. Insertion interbody device CPT C6-7 CPT code 28726  5. Anterior cervical instrumentation C5-C7 with Medtronic 40 mm titanium plate and screws  6. Use of morselized allograft for spinal fusion CPT code 19737      Surgeon(s) and Role:     * Cholo Mena MD - Primary    Assisting Surgeon: Aisha    Pre-Operative Diagnosis: DDD (degenerative disc disease), cervical [M50.30]  Arthropathy of cervical facet joint [M47.812]    Post-Operative Diagnosis: Post-Op Diagnosis Codes:     * DDD (degenerative disc disease), cervical [M50.30]     * Arthropathy of cervical facet joint [M47.812]    Anesthesia: General    Procedure in General:  The patient was brought to the operating room while supine was intubated a catheter was placed in his bladder.  A time-out was performed head halter traction with the may feel extension on table was utilized to hold his neck in a neutral position and neural monitoring leads were placed.  An endotracheal tube to monitor the recurrent laryngeal nerve was not available.  Neural monitoring leads were placed his arms were tucked at his side bony prominences padded and visualization of the cervical spine was confirmed with fluoroscopy.  The anterior cervical area was cleansed with alcohol and prepped with ChloraPrep solution and then a time-out was performed.  A transverse incision was made on the right side at C6 measuring 3 cm and we dissected to the platysma muscle and then developed the interval between the superficial and deep cervical fascia through the interval between the trachea and esophagus medially and carotid sheath laterally down to the prevertebral fascia where we identified the anterior spine the  longus coli muscles were mobilized a needle was placed into the disc and we took a lateral fluoroscopic view to confirm that we were at C6-7 [place] distraction pins were placed in the bodies of C5-6 and 7 starting at C6-7 we then incised the disc annulus the disc was markedly narrowed anterior osteophytes were removed and then the disc was removed in a piecemeal fashion using curettes pituitary Mclean and a motorized bur back to the posterior longitudinal ligament foraminotomies were performed trial sizers were inserted a 7 mm trial gave a good fit demineralized bone matrix was brought onto the field.  A Medtronic 7 mm interbody device was then filled with demineralized bone matrix and impacted into the C6-7 disc space.  Position of the implant looked satisfactory.  We then turned our attention to the C5-6 level incising the disc annulus removed the disc and cartilaginous endplates with in routine fashion with pituitary Mclean and motorized bur and curettes.  Bilateral foraminotomies were performed an interbody sizers were inserted and a 7 mm trial gave a good fit a 2nd 7 mm Medtronic interbody device was brought onto the field filled with demineralized bone matrix and then impacted into the C5-6 disc space.  Position of both interbody devices was confirmed and then a 40 mm plate from Medtronic company was placed anteriorly and after properly placed and checked with fluoroscopy was secured to the spine.  A total of 6 screws were utilized and the locking mechanisms were engaged.  Fluoroscopy views were taken of the entire construct which look satisfactory.  A drain was brought through a separate stab incision.  The wound was closed in layers using Dermabond on the skin.  Patient was extubated and brought to recovery room.  Throughout the case there were no abnormalities noted on neural monitoring.            Complications: None    Estimated Blood Loss (EBL):            Implants:   Implant Name Type Inv. Item Serial  No.  Lot No. LRB No. Used Action   spacer    MEDTRONIC [id number] N/A 1 Implanted   spacer    MEDTRONIC [id number] N/A 1 Implanted       Specimens:   Specimen (24h ago, onward)            None                  Condition: Good    Disposition: PACU - hemodynamically stable.    Attestation: I was present and scrubbed for the entire procedure.

## 2022-02-24 NOTE — ANESTHESIA PROCEDURE NOTES
Arterial    Diagnosis: Cervical stenosis    Patient location during procedure: done in OR  Procedure start time: 2/24/2022 7:18 AM  Timeout: 2/24/2022 7:18 AM  Procedure end time: 2/24/2022 7:24 AM    Staffing  Authorizing Provider: Ronald Qureshi Jr., MD  Performing Provider: Ronald Qureshi Jr., MD    Anesthesiologist was present at the time of the procedure.    Preanesthetic Checklist  Completed: patient identified, IV checked, site marked, risks and benefits discussed, surgical consent, monitors and equipment checked, pre-op evaluation, timeout performed and anesthesia consent givenArterial  Skin Prep: chlorhexidine gluconate  Local Infiltration: lidocaine  Location: radial    Catheter Size: 20 G  Catheter placement by Ultrasound guidance. Heme positive aspiration all ports.   Vessel Caliber: medium, patent  Needle advanced into vessel with real time Ultrasound guidance.  Guidewire confirmed in vessel.  Sterile sheath used.Insertion Attempts: 1  Assessment  Dressing: secured with tape and tegaderm and sutured in place and taped  Patient: Tolerated well

## 2022-02-24 NOTE — TRANSFER OF CARE
Anesthesia Transfer of Care Note    Patient: Josh Coulter    Procedure(s) Performed: Procedure(s) (LRB):  DISCECTOMY, SPINE, CERVICAL, ANTERIOR APPROACH, WITH FUSION (C5-6, C6-7) (N/A)  FUSION, SPINE, WITH INSTRUMENTATION C5-7 (N/A)  BONE GRAFT (N/A)    Patient location: PACU    Anesthesia Type: general    Transport from OR: Transported from OR on room air with adequate spontaneous ventilation    Post pain: adequate analgesia    Post assessment: no apparent anesthetic complications    Post vital signs: stable    Level of consciousness: awake and alert    Nausea/Vomiting: no nausea/vomiting    Complications: none    Transfer of care protocol was followed      Last vitals:   Visit Vitals  BP (!) 128/91 (BP Location: Right arm, Patient Position: Lying)   Pulse 102   Temp 36.6 °C (97.8 °F) (Oral)   Resp 14   SpO2 98%

## 2022-02-24 NOTE — BRIEF OP NOTE
[facility]  Brief Operative Note    SUMMARY     Surgery Date: 2/24/2022     Surgeon(s) and Role:     * Cholo Mena MD - Primary    Assisting Surgeon: Jessica    Pre-op Diagnosis:  DDD (degenerative disc disease), cervical [M50.30]  Arthropathy of cervical facet joint [M47.812]    Post-op Diagnosis:  Post-Op Diagnosis Codes:     * DDD (degenerative disc disease), cervical [M50.30]     * Arthropathy of cervical facet joint [M47.812]    Procedure(s) (LRB):  DISCECTOMY, SPINE, CERVICAL, ANTERIOR APPROACH, WITH FUSION (C5-6, C6-7) (N/A)  FUSION, SPINE, WITH INSTRUMENTATION C5-7 (N/A)  BONE GRAFT (N/A)    Anesthesia: General    Operative Findings:  Cervical disc degeneration with cervical foraminal stenosis    Estimated Blood Loss: * No values recorded between 2/24/2022  7:59 AM and 2/24/2022  9:05 AM *    Estimated Blood Loss has been documented.         Specimens:   Specimen (24h ago, onward)            None          [id number]

## 2022-02-24 NOTE — ANESTHESIA POSTPROCEDURE EVALUATION
Anesthesia Post Evaluation    Patient: Josh Coulter    Procedure(s) Performed: Procedure(s) (LRB):  DISCECTOMY, SPINE, CERVICAL, ANTERIOR APPROACH, WITH FUSION (N/A)  FUSION, SPINE, WITH INSTRUMENTATION (N/A)  BONE GRAFT (N/A)    Final Anesthesia Type: general      Patient location during evaluation: PACU  Patient participation: Yes- Able to Participate  Level of consciousness: awake and alert and oriented  Post-procedure vital signs: reviewed and stable  Pain management: adequate  Airway patency: patent    PONV status at discharge: No PONV  Anesthetic complications: no      Cardiovascular status: blood pressure returned to baseline, hemodynamically stable and stable  Respiratory status: unassisted, spontaneous ventilation and room air  Hydration status: euvolemic  Follow-up not needed.          Vitals Value Taken Time   /92 02/24/22 1130   Temp 36.4 °C (97.5 °F) 02/24/22 0914   Pulse 91 02/24/22 1133   Resp 16 02/24/22 1133   SpO2 96 % 02/24/22 1133   Vitals shown include unvalidated device data.      No case tracking events are documented in the log.      Pain/Kait Score: Pain Rating Prior to Med Admin: 4 (2/24/2022 10:15 AM)  Kait Score: 9 (2/24/2022 11:15 AM)

## 2022-02-24 NOTE — ANESTHESIA PROCEDURE NOTES
Intubation    Date/Time: 2/24/2022 7:22 AM  Performed by: Naz Yusuf CRNA  Authorized by: Ronald Qureshi Jr., MD     Intubation:     Induction:  Intravenous    Intubated:  Postinduction    Attempts:  1    Attempted By:  CRNA    Method of Intubation:  Direct    Blade:  [place] 3    Laryngeal View Grade: Grade IIA - cords partially seen      Difficult Airway Encountered?: No      Complications:  None    Airway Device:  Oral endotracheal tube    Airway Device Size:  7.5    Style/Cuff Inflation:  Cuffed    Inflation Amount (mL):  5    Tube secured:  21    Secured at:  The lips    Placement Verified By:  Capnometry    Complicating Factors:  None    Findings Post-Intubation:  BS equal bilateral and atraumatic/condition of teeth unchanged

## 2022-02-24 NOTE — H&P
CC/Indication for Procedure: 51 y.o. male with DDD (degenerative disc disease), cervical [M50.30]  Arthropathy of cervical facet joint [M47.812]  Degenerative disc disease, cervical [M50.30].    Patient scheduled for NECK SPINE FUSE&REMOVE AD [23628] (DISCECTOMY, SPINE, CERVICAL, ANTERIOR APPROACH, WITH FUSION (C5-6, C6-7))  NH ARTHRODESIS ANT INTERBODY INC DISCECTOMY, CERVICAL BELOW C2 EACH ADDL [59995] (FUSION, SPINE, WITH INSTRUMENTATION C5-7)  NH ANTERIOR INSTRUMENTATION 2-3 VERTEBRAL SEGMENTS [82847] (BONE GRAFT)  NH INSERT BIOMECH DEV W/INTERBODY ARTHRODESIS, EA CONTIGUOUS DEFECT [22853]  NH ALLOGRAFT FOR SPINE SURGERY ONLY MORSELIZED [20930].    Past Medical History:   Diagnosis Date    Arthritis     Digestive disorder     Hyponatremia     Syncope     Wears glasses      Past Surgical History:   Procedure Laterality Date    ANTERIOR LUMBAR INTERBODY FUSION (ALIF) N/A 3/31/2020    Procedure: FUSION, SPINE, LUMBAR, ALIF L4-5;  Surgeon: Cholo Mena MD;  Location: [facility] OR;  Service: Orthopedics;  Laterality: N/A;    BONE GRAFT N/A 3/31/2020    Procedure: BONE GRAFT;  Surgeon: Cholo Mena MD;  Location: [facility] OR;  Service: Orthopedics;  Laterality: N/A;    FUSION OF SPINE WITH INSTRUMENTATION N/A 3/31/2020    Procedure: FUSION, SPINE, WITH INSTRUMENTATION L4-5;  Surgeon: Cholo Mena MD;  Location: [facility] OR;  Service: Orthopedics;  Laterality: N/A;  NTI, [employer], DR DAMON, CO-SURGEON    HERNIA REPAIR      REMOVAL OF HARDWARE FROM SPINE N/A 2/11/2021    Procedure: REMOVAL, HARDWARE, SPINE L4-5;  Surgeon: Cholo Mena MD;  Location: [facility] OR;  Service: Orthopedics;  Laterality: N/A;  [employer]    SPINE SURGERY  2009     Family History   Problem Relation Age of Onset    Hyperlipidemia Mother     Hypertension Mother     Hyperlipidemia Father     Hypertension Father      Social History     Socioeconomic History    Marital status: Legally     Number of children: 3    Occupational History    Occupation: unemployed   Tobacco Use    Smoking status: Current Every Day Smoker     Packs/day: 1.00     Years: 19.00     Pack years: 19.00     Types: Cigarettes    Smokeless tobacco: Never Used   Substance and Sexual Activity    Alcohol use: Yes     Alcohol/week: 14.0 standard drinks     Types: 14 Cans of beer per week     Comment: 2 beers per night    Drug use: Yes     Types: Marijuana    Sexual activity: Yes     Partners: Female       Review of patient's allergies indicates:   Allergen Reactions    Contrast media Other (See Comments)     syncopy         Current Facility-Administered Medications:     cefazolin (ANCEF) 2 gram in dextrose 5% 50 mL IVPB (premix), 2 g, Intravenous, On Call Procedure, Cholo Mena MD    diphenhydrAMINE injection 12.5 mg, 12.5 mg, Intravenous, Q15 Min PRN, Thomas Frazier MD    HYDROmorphone injection 0.2 mg, 0.2 mg, Intravenous, Q5 Min PRN, Thomas Frazier MD    meperidine injection 12.5 mg, 12.5 mg, Intravenous, Q10 Min PRN, Thomas Frazier MD    ondansetron injection 4 mg, 4 mg, Intravenous, Daily PRN, Thomas Frazier MD    oxyCODONE immediate release tablet 5 mg, 5 mg, Oral, Q3H PRN, Thomas Frazier MD    sodium chloride 0.9% flush 10 mL, 10 mL, Intravenous, PRN, Thomas Frazier MD    thromb,rg-gtujgt-pzkdpey,s-Ca (TISSEEL) 4 mL, , Topical (Top), Once, Cholo Mena MD    ROS:    Denies chest pain or palpitations  Denies shortness of breath  Denies fevers or chills  Denies chest pain  Denies abdominal pain    PE:    General Appearance: Well nourished  Orientation: Oriented to time, place, person  Mental Status: Alert  Heart: RRR  Lungs: CTA  Abdomen: Soft and non-tender    Anesthesia/Surgery risks, benefits and alternative options discussed and understood by patient/family.    This note was created using Dragon voice recognition software that occasionally misinterpreted phrases or words.

## 2022-02-25 ENCOUNTER — ANESTHESIA (OUTPATIENT)
Dept: MEDSURG UNIT | Facility: HOSPITAL | Age: 52
End: 2022-02-25

## 2022-02-25 ENCOUNTER — ANESTHESIA EVENT (OUTPATIENT)
Dept: MEDSURG UNIT | Facility: HOSPITAL | Age: 52
End: 2022-02-25

## 2022-02-25 VITALS
OXYGEN SATURATION: 99 % | HEIGHT: 76 IN | RESPIRATION RATE: 18 BRPM | HEART RATE: 92 BPM | BODY MASS INDEX: 20.12 KG/M2 | TEMPERATURE: 98 F | DIASTOLIC BLOOD PRESSURE: 95 MMHG | SYSTOLIC BLOOD PRESSURE: 146 MMHG | WEIGHT: 165.25 LBS

## 2022-02-25 PROBLEM — R13.19 CERVICAL DYSPHAGIA: Status: ACTIVE | Noted: 2022-02-25

## 2022-02-25 PROBLEM — Z98.1 S/P CERVICAL SPINAL FUSION: Status: ACTIVE | Noted: 2022-02-25

## 2022-02-25 LAB
ANION GAP SERPL CALC-SCNC: 9 MMOL/L (ref 8–16)
BASOPHILS # BLD AUTO: 0.01 K/UL (ref 0–0.2)
BASOPHILS NFR BLD: 0.1 % (ref 0–1.9)
BUN SERPL-MCNC: 5 MG/DL (ref 6–20)
CALCIUM SERPL-MCNC: 8.4 MG/DL (ref 8.7–10.5)
CHLORIDE SERPL-SCNC: 90 MMOL/L (ref 95–110)
CO2 SERPL-SCNC: 27 MMOL/L (ref 23–29)
CREAT SERPL-MCNC: 0.7 MG/DL (ref 0.5–1.4)
DIFFERENTIAL METHOD: ABNORMAL
EOSINOPHIL # BLD AUTO: 0 K/UL (ref 0–0.5)
EOSINOPHIL NFR BLD: 0 % (ref 0–8)
ERYTHROCYTE [DISTWIDTH] IN BLOOD BY AUTOMATED COUNT: 12.6 % (ref 11.5–14.5)
EST. GFR  (AFRICAN AMERICAN): >60 ML/MIN/1.73 M^2
EST. GFR  (NON AFRICAN AMERICAN): >60 ML/MIN/1.73 M^2
GLUCOSE SERPL-MCNC: 129 MG/DL (ref 70–110)
HCT VFR BLD AUTO: 31.3 % (ref 40–54)
HGB BLD-MCNC: 10.8 G/DL (ref 14–18)
IMM GRANULOCYTES # BLD AUTO: 0.03 K/UL (ref 0–0.04)
IMM GRANULOCYTES NFR BLD AUTO: 0.4 % (ref 0–0.5)
LYMPHOCYTES # BLD AUTO: 0.7 K/UL (ref 1–4.8)
LYMPHOCYTES NFR BLD: 9.9 % (ref 18–48)
MCH RBC QN AUTO: 34.1 PG (ref 27–31)
MCHC RBC AUTO-ENTMCNC: 34.5 G/DL (ref 32–36)
MCV RBC AUTO: 99 FL (ref 82–98)
MONOCYTES # BLD AUTO: 0.9 K/UL (ref 0.3–1)
MONOCYTES NFR BLD: 13.3 % (ref 4–15)
NEUTROPHILS # BLD AUTO: 5.3 K/UL (ref 1.8–7.7)
NEUTROPHILS NFR BLD: 76.3 % (ref 38–73)
NRBC BLD-RTO: 0 /100 WBC
PLATELET # BLD AUTO: 115 K/UL (ref 150–450)
PMV BLD AUTO: 10.7 FL (ref 9.2–12.9)
POTASSIUM SERPL-SCNC: 4.2 MMOL/L (ref 3.5–5.1)
RBC # BLD AUTO: 3.17 M/UL (ref 4.6–6.2)
SODIUM SERPL-SCNC: 126 MMOL/L (ref 136–145)
WBC # BLD AUTO: 7 K/UL (ref 3.9–12.7)

## 2022-02-25 PROCEDURE — 25000003 PHARM REV CODE 250: Performed by: ORTHOPAEDIC SURGERY

## 2022-02-25 PROCEDURE — 85025 COMPLETE CBC W/AUTO DIFF WBC: CPT | Performed by: ORTHOPAEDIC SURGERY

## 2022-02-25 PROCEDURE — 63600175 PHARM REV CODE 636 W HCPCS: Performed by: PHYSICIAN ASSISTANT

## 2022-02-25 PROCEDURE — 25000003 PHARM REV CODE 250: Performed by: FAMILY MEDICINE

## 2022-02-25 PROCEDURE — G0378 HOSPITAL OBSERVATION PER HR: HCPCS

## 2022-02-25 PROCEDURE — 97161 PT EVAL LOW COMPLEX 20 MIN: CPT

## 2022-02-25 PROCEDURE — 92610 EVALUATE SWALLOWING FUNCTION: CPT

## 2022-02-25 PROCEDURE — 63600175 PHARM REV CODE 636 W HCPCS: Performed by: ORTHOPAEDIC SURGERY

## 2022-02-25 PROCEDURE — 36415 COLL VENOUS BLD VENIPUNCTURE: CPT | Performed by: ORTHOPAEDIC SURGERY

## 2022-02-25 PROCEDURE — 80048 BASIC METABOLIC PNL TOTAL CA: CPT | Performed by: ORTHOPAEDIC SURGERY

## 2022-02-25 PROCEDURE — 25000003 PHARM REV CODE 250: Performed by: ANESTHESIOLOGY

## 2022-02-25 RX ORDER — ALPRAZOLAM 0.25 MG/1
0.25 TABLET ORAL 4 TIMES DAILY PRN
Status: DISCONTINUED | OUTPATIENT
Start: 2022-02-25 | End: 2022-02-25 | Stop reason: HOSPADM

## 2022-02-25 RX ORDER — DIPHENHYDRAMINE HYDROCHLORIDE 50 MG/ML
12.5 INJECTION INTRAMUSCULAR; INTRAVENOUS EVERY 4 HOURS PRN
Status: DISCONTINUED | OUTPATIENT
Start: 2022-02-25 | End: 2022-02-25 | Stop reason: HOSPADM

## 2022-02-25 RX ORDER — NALOXONE HCL 0.4 MG/ML
0.02 VIAL (ML) INJECTION ONCE AS NEEDED
Status: DISCONTINUED | OUTPATIENT
Start: 2022-02-25 | End: 2022-02-25 | Stop reason: HOSPADM

## 2022-02-25 RX ORDER — CYCLOBENZAPRINE HCL 10 MG
10 TABLET ORAL 3 TIMES DAILY
Qty: 90 TABLET | Refills: 2 | Status: SHIPPED | OUTPATIENT
Start: 2022-02-25

## 2022-02-25 RX ORDER — DEXAMETHASONE SODIUM PHOSPHATE 100 MG/10ML
10 INJECTION INTRAMUSCULAR; INTRAVENOUS EVERY 8 HOURS
Status: DISCONTINUED | OUTPATIENT
Start: 2022-02-25 | End: 2022-02-25 | Stop reason: HOSPADM

## 2022-02-25 RX ORDER — MELOXICAM 15 MG/1
15 TABLET ORAL DAILY
Qty: 30 TABLET | Refills: 2 | Status: SHIPPED | OUTPATIENT
Start: 2022-02-25 | End: 2022-06-01

## 2022-02-25 RX ORDER — DEXAMETHASONE SODIUM PHOSPHATE 100 MG/10ML
10 INJECTION INTRAMUSCULAR; INTRAVENOUS ONCE
Status: COMPLETED | OUTPATIENT
Start: 2022-02-25 | End: 2022-02-25

## 2022-02-25 RX ORDER — HYDROMORPHONE HCL IN 0.9% NACL 6 MG/30 ML
PATIENT CONTROLLED ANALGESIA SYRINGE INTRAVENOUS CONTINUOUS
Status: DISCONTINUED | OUTPATIENT
Start: 2022-02-25 | End: 2022-02-25

## 2022-02-25 RX ORDER — DEXAMETHASONE SODIUM PHOSPHATE 100 MG/10ML
10 INJECTION INTRAMUSCULAR; INTRAVENOUS EVERY 8 HOURS
Status: DISCONTINUED | OUTPATIENT
Start: 2022-02-25 | End: 2022-02-25

## 2022-02-25 RX ORDER — OXYCODONE AND ACETAMINOPHEN 10; 325 MG/1; MG/1
1 TABLET ORAL EVERY 4 HOURS PRN
Qty: 40 TABLET | Refills: 0 | Status: SHIPPED | OUTPATIENT
Start: 2022-02-25 | End: 2022-02-25 | Stop reason: SDUPTHER

## 2022-02-25 RX ORDER — GABAPENTIN 300 MG/1
300 CAPSULE ORAL
Status: DISCONTINUED | OUTPATIENT
Start: 2022-02-25 | End: 2022-02-25 | Stop reason: HOSPADM

## 2022-02-25 RX ORDER — CETIRIZINE HYDROCHLORIDE 10 MG/1
TABLET ORAL
Qty: 30 TABLET | Refills: 3 | Status: SHIPPED | OUTPATIENT
Start: 2022-02-25

## 2022-02-25 RX ORDER — ONDANSETRON 2 MG/ML
4 INJECTION INTRAMUSCULAR; INTRAVENOUS EVERY 6 HOURS PRN
Status: DISCONTINUED | OUTPATIENT
Start: 2022-02-25 | End: 2022-02-25 | Stop reason: HOSPADM

## 2022-02-25 RX ORDER — OXYCODONE AND ACETAMINOPHEN 10; 325 MG/1; MG/1
1 TABLET ORAL EVERY 4 HOURS PRN
Qty: 40 TABLET | Refills: 0 | Status: SHIPPED | OUTPATIENT
Start: 2022-02-25 | End: 2022-03-04 | Stop reason: SDUPTHER

## 2022-02-25 RX ORDER — OXYCODONE AND ACETAMINOPHEN 7.5; 325 MG/1; MG/1
1 TABLET ORAL EVERY 4 HOURS PRN
Status: DISCONTINUED | OUTPATIENT
Start: 2022-02-25 | End: 2022-02-25 | Stop reason: HOSPADM

## 2022-02-25 RX ADMIN — DEXTROSE 2 G: 50 INJECTION, SOLUTION INTRAVENOUS at 05:02

## 2022-02-25 RX ADMIN — OXYCODONE HYDROCHLORIDE AND ACETAMINOPHEN 1 TABLET: 7.5; 325 TABLET ORAL at 03:02

## 2022-02-25 RX ADMIN — ALPRAZOLAM 0.25 MG: 0.25 TABLET ORAL at 02:02

## 2022-02-25 RX ADMIN — MUPIROCIN 1 G: 20 OINTMENT TOPICAL at 02:02

## 2022-02-25 RX ADMIN — OXYCODONE HYDROCHLORIDE 15 MG: 5 TABLET ORAL at 02:02

## 2022-02-25 RX ADMIN — OXYCODONE HYDROCHLORIDE 15 MG: 5 TABLET ORAL at 06:02

## 2022-02-25 RX ADMIN — DEXAMETHASONE SODIUM PHOSPHATE 10 MG: 10 INJECTION INTRAMUSCULAR; INTRAVENOUS at 09:02

## 2022-02-25 RX ADMIN — MUPIROCIN 1 G: 20 OINTMENT TOPICAL at 09:02

## 2022-02-25 NOTE — ASSESSMENT & PLAN NOTE
The patient is upset because he was anticipating being DC'd home this am. We spoke and he has agreed to proceed with the cervical CT while still in the hospital. If there is no airway or pharyngeal obstruction then we will DC him home to follow up next Monday in the am. Will consider ENT or speech consult at that time if needed.

## 2022-02-25 NOTE — CONSULTS
[facility] Medicine  Consult Note    Patient Name: Josh Coulter  MRN: [id number]  Admission Date: 2/24/2022  Hospital Length of Stay: 0 days  Attending Physician: Cholo Mena MD   Primary Care Provider: JORGE Ellsworth           Patient information was obtained from patient and ER records.     Consults  Subjective:     Principal Problem:<principal problem not specified>    Chief Complaint: No chief complaint on file.       HPI:    , 51-year-old male with previous history of chronic hyponatremia, title Still disorder, arthritis was consulted focal management after Arthropathy of cervical facet joint .  Otherwise no past medical history other than mentioned above and previous surgery of the lower spine.  Patient was not able to tell because of his hyponatremia  On examination patient is postop with the CHRISTOPH drain and appeared to be oozing from the base of the drain and about 30 cc of pure blood was emptied from the drainage.  Appeared to be oozing from the base of the drain insertion.  Discussed with RN and showed and advised to contact with Dr. Mena for further care because this is not my expertise.      Past Medical History:   Diagnosis Date    Arthritis     Digestive disorder     Hyponatremia     Syncope     Wears glasses        Past Surgical History:   Procedure Laterality Date    ANTERIOR LUMBAR INTERBODY FUSION (ALIF) N/A 3/31/2020    Procedure: FUSION, SPINE, LUMBAR, ALIF L4-5;  Surgeon: Cholo Mena MD;  Location: [facility] OR;  Service: Orthopedics;  Laterality: N/A;    BONE GRAFT N/A 3/31/2020    Procedure: BONE GRAFT;  Surgeon: Cholo Mena MD;  Location: [facility] OR;  Service: Orthopedics;  Laterality: N/A;    FUSION OF SPINE WITH INSTRUMENTATION N/A 3/31/2020    Procedure: FUSION, SPINE, WITH INSTRUMENTATION L4-5;  Surgeon: Cholo Mena MD;  Location: [facility] OR;  Service: Orthopedics;  Laterality: N/A;  NTI, [employer], DR DAMON, CO-SURGEON    HERNIA REPAIR       REMOVAL OF HARDWARE FROM SPINE N/A 2/11/2021    Procedure: REMOVAL, HARDWARE, SPINE L4-5;  Surgeon: Cholo Mena MD;  Location: [facility];  Service: Orthopedics;  Laterality: N/A;  [employer]    SPINE SURGERY  2009       Review of patient's allergies indicates:   Allergen Reactions    Contrast media Other (See Comments)     syncopy       No current facility-administered medications on file prior to encounter.     Current Outpatient Medications on File Prior to Encounter   Medication Sig    [DISCONTINUED] diclofenac sodium (VOLTAREN) 1 % Gel Apply 2 g topically every 6 (six) hours as needed.    [DISCONTINUED] lidocaine (LIDODERM) 5 % Apply 1 patch topically.    DULoxetine (CYMBALTA) 60 MG capsule TAKE ONE CAPSULE (60MG) BY MOUTH ONCE DAILY    tamsulosin (FLOMAX) 0.4 mg Cap TAKE TWO CAPSULES (0.8 MG TOTAL) BY MOUTH ONCE DAILY    [DISCONTINUED] cetirizine (ZYRTEC) 10 MG tablet TAKE ONE TABLET (10MG) BY MOUTH EVERY EVENING    [DISCONTINUED] cyclobenzaprine (FLEXERIL) 10 MG tablet TAKE ONE TABLET (10MG) BY MOUTH THREE TIMES DAILY AS NEEDED FOR MUSCLE SPASMS    [DISCONTINUED] meloxicam (MOBIC) 15 MG tablet TAKE ONE TABLET (15MG) BY MOUTH ONCE DAILY    [DISCONTINUED] MOVANTIK 25 mg tablet TAKE ONE TABLET (25MG) BY MOUTH ONCE DAILY     Family History     Problem Relation (Age of Onset)    Hyperlipidemia Mother, Father    Hypertension Mother, Father        Tobacco Use    Smoking status: Current Every Day Smoker     Packs/day: 1.00     Years: 19.00     Pack years: 19.00     Types: Cigarettes    Smokeless tobacco: Never Used   Substance and Sexual Activity    Alcohol use: Yes     Alcohol/week: 14.0 standard drinks     Types: 14 Cans of beer per week     Comment: 2 beers per night    Drug use: Yes     Types: Marijuana    Sexual activity: Yes     Partners: Female     Review of Systems  Objective:     Vital Signs (Most Recent):  Temp: 98.1 °F (36.7 °C) (02/24/22 1736)  Pulse: 85 (02/24/22 1736)  Resp: 18  (02/24/22 1736)  BP: (!) 145/98 (02/24/22 1736)  SpO2: 98 % (02/24/22 1736) Vital Signs (24h Range):  Temp:  [97.5 °F (36.4 °C)-98.2 °F (36.8 °C)] 98.1 °F (36.7 °C)  Pulse:  [] 85  Resp:  [9-18] 18  SpO2:  [96 %-100 %] 98 %  BP: (128-164)/() 145/98     Weight: 75 kg (165 lb 3.9 oz)  Body mass index is 20.11 kg/m².    Physical Exam  General: Patient resting comfortably in no acute distress. Appears as stated age. Calm  Eyes: EOM intact. No conjunctivae injection. No scleral icterus.  ENT: Hearing grossly intact. No discharge from ears. No nasal discharge.   CVS: RRR. No LE edema BL.  Lungs: CTA BL, no wheezing or crackles. Good breath sounds. No accessory muscle use. No acute respiratory distress  Neuro: non focal , Follows commands. Responds appropriately  Significant Labs:   All pertinent labs within the past 24 hours have been reviewed.  CBC:   Recent Labs   Lab 02/24/22  0939   WBC 3.82*   HGB 12.8*   HCT 35.9*   *     CMP:   Recent Labs   Lab 02/24/22  0538 02/24/22  0939   * 126*   K 3.7 3.7   CL 91* 91*   CO2 24 24   GLU 87 84   BUN <5* <5*   CREATININE 0.6 0.6   CALCIUM 8.5* 8.2*   ANIONGAP 12 11   EGFRNONAA >60.0 >60.0     Cardiac Markers: No results for input(s): CKMB, MYOGLOBIN, BNP, TROPISTAT in the last 48 hours.    Significant Imaging: I have reviewed all pertinent imaging results/findings within the past 24 hours.  I have reviewed and interpreted all pertinent imaging results/findings within the past 24 hours.    Assessment/Plan:     #s/p Arthropathy of cervical facet joint spine   #Chronic hyponatremia  #Arthritis   #Oozing from op site currently       PLAN   To contact dr pickard for oozing from the base of the drain   Chronic hyponatremia unsure cause , patient was on cymbalta and most likely the cause   Need to contact dr pickard if bleeding no settle   Hold cymbalta   DC IVF   Pain management   Antibiotics started by ortho   Repeat labs tomorrow   May need to fluid restrict  . No neurological involvement of hyponatremia       VTE Risk Mitigation (From admission, onward)         Ordered     IP VTE LOW RISK PATIENT  Once         02/24/22 0538     Place ZACARIAS hose  Until discontinued         02/24/22 0538     Place sequential compression device  Until discontinued         02/24/22 0538                HOS POC IP DISCHARGE SUMMARY    Thank you for your consult. I will follow-up with patient. Please contact us if you have any additional questions.    Yannick Fajardo MD  Department of Hospital Medicine   Formerly [facility], later [facility]

## 2022-02-25 NOTE — PROGRESS NOTES
[facility]  Orthopedics  Progress Note    Patient Name: Josh Coulter  MRN: [id number]  Admission Date: 2/24/2022  Hospital Length of Stay: 0 days  Attending Provider: Cholo Mena MD  Primary Care Provider: JORGE Ellsworth  Follow-up For: Procedure(s) (LRB):  DISCECTOMY, SPINE, CERVICAL, ANTERIOR APPROACH, WITH FUSION (N/A)  FUSION, SPINE, WITH INSTRUMENTATION (N/A)  BONE GRAFT (N/A)    Post-Operative Day: 1 Day Post-Op  Subjective:     No new subjective & objective note has been filed under this hospital service since the last note was generated.    Assessment/Plan:     * S/P cervical spinal fusion  Collar when OOB  Dexamethasone 10mg IV now  Stable and may be DC'd home today - no HH needed    Cervical dysphagia  The patient is upset because he was anticipating being DC'd home this am. We spoke and he has agreed to proceed with the cervical CT while still in the hospital. If there is no airway or pharyngeal obstruction then we will DC him home to follow up next Monday in the am. Will consider ENT or speech consult at that time if needed.          JP MEMBRENO  Orthopedics  [facility]

## 2022-02-25 NOTE — PT/OT/SLP EVAL
Physical Therapy Evaluation    Patient Name:  Josh Coulter   MRN:  [id number]    Recommendations:     Discharge Recommendations:  outpatient PT   Discharge Equipment Recommendations: none   Barriers to discharge: medical status    Assessment:     Josh Coulter is a 51 y.o. male admitted with a medical diagnosis of S/P cervical spinal fusion.  He presents with the following impairments/functional limitations:  weakness, impaired endurance, impaired self care skills, impaired functional mobilty, gait instability, impaired balance, decreased lower extremity function, decreased safety awareness, impaired cardiopulmonary response to activity .    Pt found walking in room with SPC forward flexed posture. Cervial Collar not on. Pt educated on need for cervical collar on when OOB. Pt verbalizes understanding and demonstrates proper application of cervical collar. Pt reports he feels weak and is agreeable to use of RW at this time. Pt ambulated 100 ft with RW and CGA with forward flexed posture with decrease hip extension bilaterally. Ambulation limited due to shortness of breath.     Rehab Prognosis: Fair; patient would benefit from acute skilled PT services to address these deficits and reach maximum level of function.    Recent Surgery: Procedure(s) (LRB):  DISCECTOMY, SPINE, CERVICAL, ANTERIOR APPROACH, WITH FUSION (N/A)  FUSION, SPINE, WITH INSTRUMENTATION (N/A)  BONE GRAFT (N/A) 1 Day Post-Op    Plan:     During this hospitalization, patient to be seen 6 x/week to address the identified rehab impairments via gait training, therapeutic activities, therapeutic exercises, neuromuscular re-education and progress toward the following goals:    · Plan of Care Expires:  03/25/22    Subjective     Chief Complaint: weakness   Patient/Family Comments/goals: return home  Pain/Comfort:  · Pain Rating 1: 0/10    Patients cultural, spiritual, Tenriism conflicts given the current situation: no    Living Environment:  Pt lives with  his son in a two story home with first floor setup. (+) ,  Prior to admission, patients level of function was MI SPC.  Equipment used at home: wheelchair, walker, rolling, cane, straight.  DME owned (not currently used): none.  Upon discharge, patient will have assistance from son.    Objective:     Communicated with RN prior to session.  Patient found ambulatory in room/conteh with peripheral IV, telemetry  upon PT entry to room.    General Precautions: Standard, fall   Orthopedic Precautions:N/A   Braces: [place] J collar (on when OOB)  Respiratory Status: Room air    Exams:  · Cognitive Exam:  Patient is oriented to Person, Place, Time and Situation  · RLE ROM: WFL  · RLE Strength: hip flexion 3/5, knee ext and ankle DF 4/5  · LLE ROM: WFL  · LLE Strength:  hip flexion 3/5, knee ext and ankle DF 4/5    Functional Mobility:  · Bed Mobility:     · Supine to Sit: independence  · Sit to Supine: independence  · Transfers:     · Sit to Stand:  supervision with rolling walker  · Gait: 100 ft with RW and CGA forward flexed posture with decrease hip extension bilaterally.    Therapeutic Activities and Exercises:   Pt educated on POC, discharge recommendation, importance of time OOB, wearing schedule for cervical collar, benefit of RW vs SPC for increase stability, need for assist with mobility, pacing/energy conservation, use of call bell to seek assistance as needed and fall prevention      AM-PAC 6 CLICK MOBILITY  Total Score:20     Patient left sitting edge of bed with all lines intact, call button in reach and RN notified.    GOALS:   Multidisciplinary Problems     Physical Therapy Goals        Problem: Physical Therapy Goal    Goal Priority Disciplines Outcome Goal Variances Interventions   Physical Therapy Goal     PT, PT/OT Ongoing, Progressing     Description: Goals to be met by: Discharge     Patient will increase functional independence with mobility by performin. Sit to stand transfer with Modified  Independent  2. Bed to chair transfer with Modified Independent with least restrictive assistive device  4. Gait  x 150 feet with Supervision using least restrictive assistive device                         History:     Past Medical History:   Diagnosis Date    Arthritis     Digestive disorder     Hyponatremia     Syncope     Wears glasses        Past Surgical History:   Procedure Laterality Date    ANTERIOR CERVICAL DISCECTOMY W/ FUSION N/A 2/24/2022    Procedure: DISCECTOMY, SPINE, CERVICAL, ANTERIOR APPROACH, WITH FUSION;  Surgeon: Cholo Mena MD;  [facility];  Service: Orthopedics;  Laterality: N/A;  IOM, MEDTRONICTobin Cast and Shamar notified on 2/23, [facility] attachment head piece, head halter chin strap, posterior cervical support, and christophe posterior cervical spine support    ANTERIOR LUMBAR INTERBODY FUSION (ALIF) N/A 3/31/2020    Procedure: FUSION, SPINE, LUMBAR, ALIF L4-5;  Surgeon: Cholo Mena MD;  Location: [facility] OR;  Service: Orthopedics;  Laterality: N/A;    BONE GRAFT N/A 3/31/2020    Procedure: BONE GRAFT;  Surgeon: Cholo Mena MD;  Location: [facility] OR;  Service: Orthopedics;  Laterality: N/A;    BONE GRAFT N/A 2/24/2022    Procedure: BONE GRAFT;  Surgeon: Cholo Mena MD;  Location: [facility] OR;  Service: Orthopedics;  Laterality: N/A;    FUSION OF SPINE WITH INSTRUMENTATION N/A 3/31/2020    Procedure: FUSION, SPINE, WITH INSTRUMENTATION L4-5;  Surgeon: Cholo Mena MD;  Location: [facility] OR;  Service: Orthopedics;  Laterality: N/A;  NTI, [employer], DR DAMON, CO-SURGEON    FUSION OF SPINE WITH INSTRUMENTATION N/A 2/24/2022    Procedure: FUSION, SPINE, WITH INSTRUMENTATION;  Surgeon: Cholo Mena MD;  Location: [facility] OR;  Service: Orthopedics;  Laterality: N/A;    HERNIA REPAIR      REMOVAL OF HARDWARE FROM SPINE N/A 2/11/2021    Procedure: REMOVAL, HARDWARE, SPINE L4-5;  Surgeon: Cholo Mena MD;  Location: [facility] OR;  Service: Orthopedics;   Laterality: N/A;  [employer]    SPINE SURGERY  2009       Time Tracking:     PT Received On: 02/25/22  PT Start Time: 1028     PT Stop Time: 1038  PT Total Time (min): 10 min     Billable Minutes: Evaluation 10      02/25/2022

## 2022-02-25 NOTE — PT/OT/SLP EVAL
Speech Language Pathology Evaluation  Bedside Swallow    Patient Name:  Josh Coulter   MRN:  [id number]  Admitting Diagnosis: S/P cervical spinal fusion    Recommendations:                 General Recommendations:  Follow-up not indicated  Diet recommendations:  Regular, Thin   Aspiration Precautions: Alternating bites/sips, Frequent oral care, HOB to 90 degrees and Meds whole 1 at a time, monitor for signs of aspiration  General Precautions: Standard    Communication strategies:  none    History:     Past Medical History:   Diagnosis Date    Arthritis     Digestive disorder     Hyponatremia     Syncope     Wears glasses        Past Surgical History:   Procedure Laterality Date    ANTERIOR CERVICAL DISCECTOMY W/ FUSION N/A 2/24/2022    Procedure: DISCECTOMY, SPINE, CERVICAL, ANTERIOR APPROACH, WITH FUSION;  Surgeon: Cholo Mena MD;  Location: [facility] OR;  Service: Orthopedics;  Laterality: N/A;  IOM, MEDTRONICTobin Cárdenas notified on 2/23, [facility] attachment head piece, head halter chin strap, posterior cervical support, and christophe posterior cervical spine support    ANTERIOR LUMBAR INTERBODY FUSION (ALIF) N/A 3/31/2020    Procedure: FUSION, SPINE, LUMBAR, ALIF L4-5;  Surgeon: Cholo Mena MD;  Location: [facility] OR;  Service: Orthopedics;  Laterality: N/A;    BONE GRAFT N/A 3/31/2020    Procedure: BONE GRAFT;  Surgeon: Cholo Mena MD;  Location: [facility] OR;  Service: Orthopedics;  Laterality: N/A;    BONE GRAFT N/A 2/24/2022    Procedure: BONE GRAFT;  Surgeon: Cholo Mena MD;  Location: [facility] OR;  Service: Orthopedics;  Laterality: N/A;    FUSION OF SPINE WITH INSTRUMENTATION N/A 3/31/2020    Procedure: FUSION, SPINE, WITH INSTRUMENTATION L4-5;  Surgeon: Cholo Mena MD;  Location: [facility] OR;  Service: Orthopedics;  Laterality: N/A;  NTI, [employer], DR DAMON, CO-SURGEON    FUSION OF SPINE WITH INSTRUMENTATION N/A 2/24/2022    Procedure: FUSION, SPINE, WITH INSTRUMENTATION;   Surgeon: Cholo Mena MD;  Location: [facility] OR;  Service: Orthopedics;  Laterality: N/A;    HERNIA REPAIR      REMOVAL OF HARDWARE FROM SPINE N/A 2/11/2021    Procedure: REMOVAL, HARDWARE, SPINE L4-5;  Surgeon: Cholo Mena MD;  Location: [facility] OR;  Service: Orthopedics;  Laterality: N/A;  [employer]    SPINE SURGERY  2009       Subjective     · Alert, oriented able to follow simple commands.   · Pt's nurse present at conclusion of assessment; mild confusion noted with regard to modification of discharge plans.  · Hypernasal vocal quality suspected 2/2 swelling s/p cervical spine surgery.     Patient goals: To discharge      Pain/Comfort:  · Pain Rating 1: 0/10    Respiratory Status: Room air    Objective:     Oral Musculature Evaluation  · Oral Musculature: WFL  · Dentition: present and adequate  · Secretion Management: adequate  · Mucosal Quality: adequate  · Mandibular Strength and Mobility: WFL  · Oral Labial Strength and Mobility: WFL  · Lingual Strength and Mobility: WFL  · Velar Elevation: WFL  · Buccal Strength and Mobility: WFL  · Volitional Cough: Adequate  · Volitional Swallow: Unable to asses d/t neck brace  · Voice Prior to PO Intake: Hypernasal s/p cervical spine surgery, otherwise clear, unremarkable for wet vocal quality    Bedside Swallow Eval:   Consistencies Assessed:  · Thin liquids - 3oz via straw   · Puree - x2oz   · Soft solids - x2     Oral Phase:   · WFL    Pharyngeal Phase:   · no overt clinical signs/symptoms of aspiration    Compensatory Strategies  · None    Treatment: Pt seen for b/s swallow evaluation. Education provided regarding overt s/s of aspiration and implementation of safe swallow practices during meals.     Assessment:     Josh Coulter is a 51 y.o. male with adequate swallowing function for continuation of least restrictive regular consistency diet and thin liquids. No further ST services warranted at this time.     Goals:   Multidisciplinary Problems     SLP Goals      Not on file          Multidisciplinary Problems (Resolved)        Problem: SLP Goal    Goal Priority Disciplines Outcome   SLP Goal   (Resolved)     SLP Met   Description: 1. Pt will tolerate least restrictive diet and liquids w/o overt s/s of aspiration.                    Plan:     · Patient to be seen:   No    · Plan of Care expires:   2/25/2022  · Plan of Care reviewed with:  patient   · SLP Follow-Up:  No       Discharge recommendations:  other (see comments) (TBD)   Barriers to Discharge:  None    Time Tracking:     SLP Treatment Date:   02/25/22  Speech Start Time:  1124  Speech Stop Time:  1138     Speech Total Time (min):  14 min    Billable Minutes: Eval Swallow and Oral Function 14min    02/25/2022

## 2022-02-25 NOTE — HPI
POD #1 S/P ACDF  - Doing well. Minimal pain    Patient was seen this morning. He was having some gurgling noticed with his speech and he was reporting some difficulty swallowing his own secretions; although he has been able to eat and drink without difficulty.  I discussed the case with Dr. Mena and he recommended ENT consult and a cervical CT.

## 2022-02-25 NOTE — PLAN OF CARE
[facility]  Initial Discharge Assessment       Primary Care Provider: JORGE Ellsworth    Admission Diagnosis: DDD (degenerative disc disease), cervical [M50.30]  Arthropathy of cervical facet joint [M47.812]  Degenerative disc disease, cervical [M50.30]    Admission Date: 2/24/2022  Expected Discharge Date: 2/26/2022    Discharge Barriers Identified: (P) None  Assessment completed at bedside with patient. Patient does not have an AD or POA. Patient lives with his adult son, but he did not provide the contact information. Patient seemed insistent that he can take care of himself and that his son doesn't help him but could if he needed. Patient also stated that he is  from his wife, who is listed as the emergency contact.   Patient uses a wheelchair and straight cane at home. Patient wants to return home with family at discharge.     Payor: [employer] / Plan: [employer] MEDICARE HMO / Product Type: Medicare Advantage /     Extended Emergency Contact Information  Primary Emergency Contact: johngeovannyjulian  Mobile Phone: [phone]  Relation: Spouse  Secondary Emergency Contact: MarylinRinku  Address: [address] [place] [address]  Home Phone: [phone]  Mobile Phone: [phone]  Relation: Father   needed? No    Discharge Plan A: (P) Home with family  Discharge Plan B: (P) Home with family      [facility] [address] [address]  [address]  [address]  [facility] [address]  Phone: [phone] Fax: [phone]      Initial Assessment (most recent)       Adult Discharge Assessment - 02/25/22 1553          Discharge Assessment    Assessment Type Discharge Planning Assessment (P)      Confirmed/corrected address, phone number and insurance Yes (P)      Confirmed Demographics Correct on Facesheet (P)      Source of Information patient (P)      When was your last doctors appointment? -- (P)    January    Communicated JOSE with  patient/caregiver Date not available/Unable to determine (P)      Reason For Admission planned surgery on his neck (P)      Lives With child(nadia), adult (P)      Do you expect to return to your current living situation? Yes (P)      Do you have help at home or someone to help you manage your care at home? Yes (P)      Who are your caregiver(s) and their phone number(s)? Adult son, number not provided at assessment (P)      Prior to hospitilization cognitive status: Alert/Oriented (P)      Current cognitive status: Alert/Oriented (P)      Walking or Climbing Stairs Difficulty ambulation difficulty, requires equipment (P)      Mobility Management straight cane, wheelchair (P)      Dressing/Bathing Difficulty none (P)      Home Accessibility wheelchair accessible (P)      Home Layout Able to live on 1st floor (P)      Equipment Currently Used at Home cane, straight;wheelchair (P)      Readmission within 30 days? No (P)      Do you currently have service(s) that help you manage your care at home? No (P)      Do you take prescription medications? Yes (P)      Do you have prescription coverage? Yes (P)      Coverage Medicare and Medicaid (P)      Do you have any problems affording any of your prescribed medications? No (P)      Is the patient taking medications as prescribed? yes (P)      Who is going to help you get home at discharge? adult son (P)      How do you get to doctors appointments? car, drives self;family or friend will provide (P)      Are you on dialysis? No (P)      Do you take coumadin? No (P)      Discharge Plan A Home with family (P)      Discharge Plan B Home with family (P)      DME Needed Upon Discharge  none (P)      Discharge Plan discussed with: Patient (P)      Discharge Barriers Identified None (P)

## 2022-02-25 NOTE — DISCHARGE SUMMARY
"[facility]  Orthopedics  Discharge Summary      Patient Name: Josh Coulter  MRN: [id number]  Admission Date: 2/24/2022  Hospital Length of Stay: 0 days  Discharge Date and Time:  02/25/2022 5:46 PM  Attending Physician: Ludin Ayala MD   Discharging Provider: JP MEMBRENO  Primary Care Provider: JORGE Ellsworth    HPI:   POD #1 S/P ACDF  - Doing well. Minimal pain    Patient was seen this morning. He was having some gurgling noticed with his speech and he was reporting some difficulty swallowing his own secretions; although he has been able to eat and drink without difficulty.  I discussed the case with Dr. Ayala and he recommended ENT consult and a cervical CT.       Procedure(s) (LRB):  DISCECTOMY, SPINE, CERVICAL, ANTERIOR APPROACH, WITH FUSION (N/A)  FUSION, SPINE, WITH INSTRUMENTATION (N/A)  BONE GRAFT (N/A)      Hospital Course:  Patient had much improved pain post-op. However, he was demonstrating some "gurgling" noises with speech. Despite this he was able to eat solid foods and drink liquids without any problems. A post-op CT done today demonstrated a large post-op hematoma but no airway or esophageal obstruction; this is no uncommon S/P ACDF. We had recommended that the patient stay 1 more night for observation but he refused so he was DC'd home. Was instructed to head to the ER immediately with any signs or symptoms of airway distress; he understood. I saw the patient in person this am; we spoke by phone around noon; he spoke to Dr. Ayala this afternoon by phone.      Goals of Care Treatment Preferences:  Code Status: Full Code      Consults (From admission, onward)        Status Ordering Provider     Inpatient consult to Hospitalist  Once        Provider:  Yannick Fajardo MD    Acknowledged LUDIN AYALA     Anesthesia consult for PCA management  Once        Provider:  Thomas Frazier MD    Acknowledged LUDIN AYALA          Significant Diagnostic Studies: " Radiology: CT scan: CT ABDOMEN PELVIS WITHOUT CONTRAST: No results found for this visit on 02/24/22.    Pending Diagnostic Studies:     None        Final Active Diagnoses:    Diagnosis Date Noted POA    PRINCIPAL PROBLEM:  S/P cervical spinal fusion [Z98.1] 02/25/2022 Not Applicable    Cervical dysphagia [R13.19] 02/25/2022 No      Problems Resolved During this Admission:      Discharged Condition: stable    Disposition: Home or Self Care    Follow Up:    Patient Instructions:   No discharge procedures on file.  Medications:  Reconciled Home Medications:      Medication List      START taking these medications    oxyCODONE-acetaminophen  mg per tablet  Commonly known as: PERCOCET  Take 1 tablet by mouth every 4 (four) hours as needed for Pain.        CHANGE how you take these medications    cyclobenzaprine 10 MG tablet  Commonly known as: FLEXERIL  Take 1 tablet (10 mg total) by mouth 3 (three) times daily.  What changed: See the new instructions.     meloxicam 15 MG tablet  Commonly known as: MOBIC  Take 1 tablet (15 mg total) by mouth once daily.  What changed: See the new instructions.        CONTINUE taking these medications    cetirizine 10 MG tablet  Commonly known as: ZYRTEC  TAKE ONE TABLET (10MG) BY MOUTH EVERY EVENING     DULoxetine 60 MG capsule  Commonly known as: CYMBALTA  TAKE ONE CAPSULE (60MG) BY MOUTH ONCE DAILY     tamsulosin 0.4 mg Cap  Commonly known as: FLOMAX  TAKE TWO CAPSULES (0.8 MG TOTAL) BY MOUTH ONCE DAILY        STOP taking these medications    diclofenac sodium 1 % Gel  Commonly known as: VOLTAREN     HYDROcodone-acetaminophen 5-325 mg per tablet  Commonly known as: NORCO     LIDOcaine 5 %  Commonly known as: LIDODERM     MOVANTIK 25 mg tablet  Generic drug: naloxegoL            JP MEMBRENO  Orthopedics  [facility]

## 2022-02-25 NOTE — PROGRESS NOTES
[facility]  Orthopedics  Progress Note    Patient Name: Josh Coulter  MRN: [id number]  Admission Date: 2/24/2022  Hospital Length of Stay: 0 days  Attending Provider: Cholo Mena MD  Primary Care Provider: JORGE Ellsworth  Follow-up For: Procedure(s) (LRB):  DISCECTOMY, SPINE, CERVICAL, ANTERIOR APPROACH, WITH FUSION (N/A)  FUSION, SPINE, WITH INSTRUMENTATION (N/A)  BONE GRAFT (N/A)    Post-Operative Day: 1 Day Post-Op  Subjective:     No new subjective & objective note has been filed under this hospital service since the last note was generated.    Assessment/Plan:     * S/P cervical spinal fusion  Collar when OOB  Dexamethasone 10mg IV now  Stable and may be DC'd home today - no HH needed    Cervical dysphagia  The patient is upset because he was anticipating being DC'd home this am. We spoke and he has agreed to proceed with the cervical CT while still in the hospital. If there is no airway or pharyngeal obstruction then we will DC him home to follow up next Monday in the am. Will consider ENT or speech consult at that time if needed.          JP MEMBRENO  Orthopedics  [facility]

## 2022-02-25 NOTE — HOSPITAL COURSE
"Patient had much improved pain post-op. However, he was demonstrating some "gurgling" noises with speech. Despite this he was able to eat solid foods and drink liquids without any problems. A post-op CT done today demonstrated a large post-op hematoma but no airway or esophageal obstruction; this is no uncommon S/P ACDF. We had recommended that the patient stay 1 more night for observation but he refused so he was DC'd home. Was instructed to head to the ER immediately with any signs or symptoms of airway distress; he understood. I saw the patient in person this am; we spoke by phone around noon; he spoke to Dr. Mena this afternoon by phone.  "

## 2022-02-25 NOTE — PLAN OF CARE
Problem: SLP Goal  Goal: SLP Goal  Description: 1. Pt will tolerate least restrictive diet and liquids w/o overt s/s of aspiration.   Outcome: Met     Pt seen for b/s swallow evaluation. NO further ST services warranted; pt at baseline LOF for swallowing.

## 2022-02-25 NOTE — CONSULTS
Received consult for PCA this morning.  Orthopedic notes indicate that the patient is ready for discharge today.  Pain is under control at this time.  No PCA is indicated at this point.

## 2022-02-25 NOTE — SUBJECTIVE & OBJECTIVE
"Principal Problem:<principal problem not specified>    Principal Orthopedic Problem: S/P ACDF    Interval History: retained oral secretions    Review of patient's allergies indicates:   Allergen Reactions    Contrast media Other (See Comments)     syncopy       Current Facility-Administered Medications   Medication    acetaminophen tablet 650 mg    ALPRAZolam tablet 0.25 mg    aluminum-magnesium hydroxide-simethicone 200-200-20 mg/5 mL suspension 30 mL    bisacodyL suppository 10 mg    HYDROmorphone injection 1 mg    HYDROmorphone injection 2 mg    melatonin tablet 9 mg    mupirocin 2 % ointment 1 g    ondansetron injection 4 mg    oxyCODONE immediate release tablet 10 mg    oxyCODONE immediate release tablet 15 mg    oxyCODONE immediate release tablet 5 mg    prochlorperazine injection Soln 5 mg    senna-docusate 8.6-50 mg per tablet 2 tablet    sodium chloride 0.9% flush 10 mL     Objective:     Vital Signs (Most Recent):  Temp: 97.7 °F (36.5 °C) (02/25/22 0539)  Pulse: 88 (02/25/22 0539)  Resp: 18 (02/25/22 0603)  BP: (!) 160/99 (02/25/22 0539)  SpO2: 98 % (02/25/22 0539)   Vital Signs (24h Range):  Temp:  [97.5 °F (36.4 °C)-98.2 °F (36.8 °C)] 97.7 °F (36.5 °C)  Pulse:  [] 88  Resp:  [9-18] 18  SpO2:  [96 %-100 %] 98 %  BP: (130-164)/() 160/99     Weight: 75 kg (165 lb 3.9 oz)  Height: 6' 4" (193 cm)  Body mass index is 20.11 kg/m².      Intake/Output Summary (Last 24 hours) at 2/25/2022 0724  Last data filed at 2/25/2022 0208  Gross per 24 hour   Intake 2040 ml   Output 1865 ml   Net 175 ml       General    Nursing note and vitals reviewed.  Constitutional: He is oriented to person, place, and time. He appears well-developed and well-nourished.   Pulmonary/Chest: Effort normal.   Neurological: He is alert and oriented to person, place, and time.   Psychiatric: He has a normal mood and affect. His behavior is normal.         Back (L-Spine & T-Spine) / Neck (C-Spine) Exam     Comments:  Dressing C/D/I " - DC'd CHRISTOPH drain and changed dressing. Andrea DNVI. Voice is not normal - sounds like he has retained secretions in his oropharynx.      Significant Labs: CBC:   Recent Labs   Lab 02/24/22  0939   WBC 3.82*   HGB 12.8*   HCT 35.9*   *     CMP:   Recent Labs   Lab 02/24/22  0538 02/24/22  0939   * 126*   K 3.7 3.7   CL 91* 91*   CO2 24 24   GLU 87 84   BUN <5* <5*   CREATININE 0.6 0.6   CALCIUM 8.5* 8.2*   ANIONGAP 12 11   EGFRNONAA >60.0 >60.0     All pertinent labs within the past 24 hours have been reviewed.    Significant Imaging: None

## 2022-02-25 NOTE — PROGRESS NOTES
"[facility]  Orthopedics  Progress Note    Patient Name: Josh Coulter  MRN: [id number]  Admission Date: 2/24/2022  Hospital Length of Stay: 0 days  Attending Provider: Cholo Mena MD  Primary Care Provider: JORGE Ellsworth  Follow-up For: Procedure(s) (LRB):  DISCECTOMY, SPINE, CERVICAL, ANTERIOR APPROACH, WITH FUSION (N/A)  FUSION, SPINE, WITH INSTRUMENTATION (N/A)  BONE GRAFT (N/A)    Post-Operative Day: 1 Day Post-Op  Subjective:     Principal Problem:<principal problem not specified>    Principal Orthopedic Problem: S/P ACDF    Interval History: retained oral secretions    Review of patient's allergies indicates:   Allergen Reactions    Contrast media Other (See Comments)     syncopy       Current Facility-Administered Medications   Medication    acetaminophen tablet 650 mg    ALPRAZolam tablet 0.25 mg    aluminum-magnesium hydroxide-simethicone 200-200-20 mg/5 mL suspension 30 mL    bisacodyL suppository 10 mg    HYDROmorphone injection 1 mg    HYDROmorphone injection 2 mg    melatonin tablet 9 mg    mupirocin 2 % ointment 1 g    ondansetron injection 4 mg    oxyCODONE immediate release tablet 10 mg    oxyCODONE immediate release tablet 15 mg    oxyCODONE immediate release tablet 5 mg    prochlorperazine injection Soln 5 mg    senna-docusate 8.6-50 mg per tablet 2 tablet    sodium chloride 0.9% flush 10 mL     Objective:     Vital Signs (Most Recent):  Temp: 97.7 °F (36.5 °C) (02/25/22 0539)  Pulse: 88 (02/25/22 0539)  Resp: 18 (02/25/22 0603)  BP: (!) 160/99 (02/25/22 0539)  SpO2: 98 % (02/25/22 0539)   Vital Signs (24h Range):  Temp:  [97.5 °F (36.4 °C)-98.2 °F (36.8 °C)] 97.7 °F (36.5 °C)  Pulse:  [] 88  Resp:  [9-18] 18  SpO2:  [96 %-100 %] 98 %  BP: (130-164)/() 160/99     Weight: 75 kg (165 lb 3.9 oz)  Height: 6' 4" (193 cm)  Body mass index is 20.11 kg/m².      Intake/Output Summary (Last 24 hours) at 2/25/2022 0724  Last data filed at 2/25/2022 " 0208  Gross per 24 hour   Intake 2040 ml   Output 1865 ml   Net 175 ml       General    Nursing note and vitals reviewed.  Constitutional: He is oriented to person, place, and time. He appears well-developed and well-nourished.   Pulmonary/Chest: Effort normal.   Neurological: He is alert and oriented to person, place, and time.   Psychiatric: He has a normal mood and affect. His behavior is normal.         Back (L-Spine & T-Spine) / Neck (C-Spine) Exam     Comments:  Dressing C/D/I - DC'd CHRISTOPH drain and changed dressing. BUEs DNVI. Voice is not normal - sounds like he has retained secretions in his oropharynx.      Significant Labs: CBC:   Recent Labs   Lab 02/24/22  0939   WBC 3.82*   HGB 12.8*   HCT 35.9*   *     CMP:   Recent Labs   Lab 02/24/22  0538 02/24/22  0939   * 126*   K 3.7 3.7   CL 91* 91*   CO2 24 24   GLU 87 84   BUN <5* <5*   CREATININE 0.6 0.6   CALCIUM 8.5* 8.2*   ANIONGAP 12 11   EGFRNONAA >60.0 >60.0     All pertinent labs within the past 24 hours have been reviewed.    Significant Imaging: None    Assessment/Plan:     * S/P cervical spinal fusion  Collar when OOB  Dexamethasone 10mg IV now  Stable and may be DC'd home today - no HH needed          JP MEMBRENO  Orthopedics  Formerly [facility]

## 2022-02-26 NOTE — PLAN OF CARE
02/26/22 0859   Final Note   Assessment Type Final Discharge Note   Anticipated Discharge Disposition Home   Orders reviewed - pt discharged home with no case management needs noted.

## 2022-02-26 NOTE — PROGRESS NOTES
[facility] Medicine  Progress Note    Patient Name: Josh Coulter  MRN: [id number]  Patient Class: OP- Observation   Admission Date: 2/24/2022  Length of Stay: 0 days  Attending Physician: No att. providers found  Primary Care Provider: JORGE Ellsworth        Subjective:     Principal Problem:S/P cervical spinal fusion    Interval History:    Seem earlier in am   He was having  gurgling sound when speaking and he can stay only in extended position of his  neck despite he was not with collar and asked RN to inform orthopedic team   Not bleeding noted     Review of Systems  Objective:     Vital Signs (Most Recent):  Temp:  (patient refused all vitals) (02/25/22 1616)  Pulse: 92 (02/25/22 1221)  Resp: 18 (02/25/22 1553)  BP: (!) 146/95 (02/25/22 1221)  SpO2: 99 % (02/25/22 1616) Vital Signs (24h Range):  Temp:  [97.4 °F (36.3 °C)-97.9 °F (36.6 °C)] 97.9 °F (36.6 °C)  Pulse:  [88-98] 92  Resp:  [18] 18  SpO2:  [96 %-100 %] 99 %  BP: (140-160)/() 146/95     Weight: 75 kg (165 lb 3.9 oz)  Body mass index is 20.11 kg/m².    Intake/Output Summary (Last 24 hours) at 2/25/2022 1913  Last data filed at 2/25/2022 0208  Gross per 24 hour   Intake 240 ml   Output 75 ml   Net 165 ml      Physical Exam  General: Patient resting comfortably in no acute distress. Appears as stated age. Calm  Eyes: EOM intact. No conjunctivae injection. No scleral icterus.  ENT: Hearing grossly intact. No discharge from ears. No nasal discharge.   CVS: RRR. No LE edema BL.  Lungs:  No accessory muscle use. No acute respiratory distress  Neuro: non focal , Follows commands. Responds appropriately    Significant Labs:   All pertinent labs within the past 24 hours have been reviewed.  BMP:   Recent Labs   Lab 02/25/22  0705   *   *   K 4.2   CL 90*   CO2 27   BUN 5*   CREATININE 0.7   CALCIUM 8.4*     CBC:   Recent Labs   Lab 02/24/22  0939 02/25/22  0705   WBC 3.82* 7.00   HGB 12.8* 10.8*   HCT 35.9*  31.3*   * 115*     CMP:   Recent Labs   Lab 02/24/22  0538 02/24/22  0939 02/25/22  0705   * 126* 126*   K 3.7 3.7 4.2   CL 91* 91* 90*   CO2 24 24 27   GLU 87 84 129*   BUN <5* <5* 5*   CREATININE 0.6 0.6 0.7   CALCIUM 8.5* 8.2* 8.4*   ANIONGAP 12 11 9   EGFRNONAA >60.0 >60.0 >60.0       Significant Imaging: I have reviewed all pertinent imaging results/findings within the past 24 hours.    Assessment/Plan:      Active Diagnoses:    Diagnosis Date Noted POA    PRINCIPAL PROBLEM:  S/P cervical spinal fusion [Z98.1] 02/25/2022 Not Applicable    Cervical dysphagia [R13.19] 02/25/2022 No      Problems Resolved During this Admission:        #s/p Arthropathy of cervical facet joint spine   #Chronic hyponatremia  #Arthritis   #Oozing from op site currently    #Post op ACDFretropharyngeal   hematoma as large as  10.9 cm      PLAN   Chronic hyponatremia unsure cause secondary to psych med: improving    Advised pt to follow up with PCP who prescribed. Currently held .  Repeat CT neck findings d/w dr Mena and he said stable and  cleared for discharge .  orthopedic team discharged the patient .  Will sign off .       VTE Risk Mitigation (From admission, onward)         Ordered     IP VTE LOW RISK PATIENT  Once         02/24/22 0538     Place ZACARIAS hose  Until discontinued         02/24/22 0538     Place sequential compression device  Until discontinued         02/24/22 0538                   Yannick Fajardo MD  Department of Hospital Medicine   [facility]

## 2022-03-04 ENCOUNTER — OFFICE VISIT (OUTPATIENT)
Dept: ORTHOPEDICS | Facility: CLINIC | Age: 52
End: 2022-03-04
Payer: COMMERCIAL

## 2022-03-04 VITALS — WEIGHT: 165 LBS | HEIGHT: 76 IN | BODY MASS INDEX: 20.09 KG/M2 | HEART RATE: 80 BPM

## 2022-03-04 DIAGNOSIS — Z98.1 S/P CERVICAL SPINAL FUSION: Primary | ICD-10-CM

## 2022-03-04 PROBLEM — M51.26 DISPLACEMENT OF LUMBAR INTERVERTEBRAL DISC WITHOUT MYELOPATHY: Status: ACTIVE | Noted: 2022-03-04

## 2022-03-04 PROBLEM — M50.20 PROLAPSED CERVICAL INTERVERTEBRAL DISC: Status: ACTIVE | Noted: 2022-03-04

## 2022-03-04 PROBLEM — M47.812 CERVICAL SPONDYLOSIS WITHOUT MYELOPATHY: Status: ACTIVE | Noted: 2022-03-04

## 2022-03-04 PROBLEM — M47.816 LUMBAR FACET JOINT SYNDROME: Status: ACTIVE | Noted: 2022-03-04

## 2022-03-04 PROCEDURE — 1160F PR REVIEW ALL MEDS BY PRESCRIBER/CLIN PHARMACIST DOCUMENTED: ICD-10-PCS | Mod: S$GLB,,, | Performed by: PHYSICIAN ASSISTANT

## 2022-03-04 PROCEDURE — 3008F PR BODY MASS INDEX (BMI) DOCUMENTED: ICD-10-PCS | Mod: S$GLB,,, | Performed by: PHYSICIAN ASSISTANT

## 2022-03-04 PROCEDURE — 99024 POSTOP FOLLOW-UP VISIT: CPT | Mod: S$GLB,,, | Performed by: PHYSICIAN ASSISTANT

## 2022-03-04 PROCEDURE — 99024 PR POST-OP FOLLOW-UP VISIT: ICD-10-PCS | Mod: S$GLB,,, | Performed by: PHYSICIAN ASSISTANT

## 2022-03-04 PROCEDURE — 1160F RVW MEDS BY RX/DR IN RCRD: CPT | Mod: S$GLB,,, | Performed by: PHYSICIAN ASSISTANT

## 2022-03-04 PROCEDURE — 3008F BODY MASS INDEX DOCD: CPT | Mod: S$GLB,,, | Performed by: PHYSICIAN ASSISTANT

## 2022-03-04 RX ORDER — PREGABALIN 50 MG/1
CAPSULE ORAL
COMMUNITY
Start: 2022-03-02

## 2022-03-04 RX ORDER — OXYCODONE AND ACETAMINOPHEN 10; 325 MG/1; MG/1
1 TABLET ORAL EVERY 4 HOURS PRN
Qty: 40 TABLET | Refills: 0 | Status: SHIPPED | OUTPATIENT
Start: 2022-03-04 | End: 2022-03-10 | Stop reason: SDUPTHER

## 2022-03-04 NOTE — PROGRESS NOTES
Subjective:    Patient ID: Josh Coulter is a 51 y.o. male.    Chief Complaint: Post-op Evaluation of the Neck (S/p C5-6, C6-7 ACDF 2/24, he states he was told to come in for a wound check, he is still having pain, he needs a RF on Oxycodone 10mg)      History of Present Illness    Prior to meeting with the patient I reviewed the medical chart in [facility]. This included reviewing the previous progress notes from our office, review of the patient's last appointment with their primary care provider, review of any visits to the emergency room, and review of any pain management appointments or procedures.  Patient is here about 1 week postop status post C5-6 and C6-7 ACDF.  There was some postoperative complications with him gurgling.  However, the patient went home postop day 1 and has done great.  States that his pain is improved and he is very pleased with his outcome thus far.  However he also states that he had a significant amount of anterior cervical and chest ecchymosis since the surgery that is resolving.  He denies any type of liver failure or hematological problems or the use of blood thinners.    Current Medications  Current Outpatient Medications   Medication Sig Dispense Refill    cetirizine (ZYRTEC) 10 MG tablet TAKE ONE TABLET (10MG) BY MOUTH EVERY EVENING 30 tablet 3    cyclobenzaprine (FLEXERIL) 10 MG tablet Take 1 tablet (10 mg total) by mouth 3 (three) times daily. 90 tablet 2    DULoxetine (CYMBALTA) 60 MG capsule TAKE ONE CAPSULE (60MG) BY MOUTH ONCE DAILY 30 capsule 5    meloxicam (MOBIC) 15 MG tablet Take 1 tablet (15 mg total) by mouth once daily. 30 tablet 2    oxyCODONE-acetaminophen (PERCOCET)  mg per tablet Take 1 tablet by mouth every 4 (four) hours as needed for Pain. 40 tablet 0    pregabalin (LYRICA) 50 MG capsule       tamsulosin (FLOMAX) 0.4 mg Cap TAKE TWO CAPSULES (0.8 MG TOTAL) BY MOUTH ONCE DAILY 60 capsule 3     No current facility-administered medications for this  visit.       Allergies  Review of patient's allergies indicates:   Allergen Reactions    Contrast media Other (See Comments)     syncopy       Past Medical History  Past Medical History:   Diagnosis Date    Arthritis     Digestive disorder     Hyponatremia     Syncope     Wears glasses        Surgical History  Past Surgical History:   Procedure Laterality Date    ANTERIOR CERVICAL DISCECTOMY W/ FUSION N/A 2/24/2022    Procedure: DISCECTOMY, SPINE, CERVICAL, ANTERIOR APPROACH, WITH FUSION;  Surgeon: Cholo Mena MD;  Location: [facility] OR;  Service: Orthopedics;  Laterality: N/A;  [employer], [employer]- Segun and Shamar notified on 2/23, [facility] attachment head piece, head halter chin strap, posterior cervical support, and christophe posterior cervical spine support    ANTERIOR LUMBAR INTERBODY FUSION (ALIF) N/A 3/31/2020    Procedure: FUSION, SPINE, LUMBAR, ALIF L4-5;  Surgeon: Cholo Mena MD;  Location: [facility] OR;  Service: Orthopedics;  Laterality: N/A;    BONE GRAFT N/A 3/31/2020    Procedure: BONE GRAFT;  Surgeon: Cholo Mena MD;  Location: [facility] [place] OR;  Service: Orthopedics;  Laterality: N/A;    BONE GRAFT N/A 2/24/2022    Procedure: BONE GRAFT;  Surgeon: Cholo Mena MD;  Location: [facility] OR;  Service: Orthopedics;  Laterality: N/A;    FUSION OF SPINE WITH INSTRUMENTATION N/A 3/31/2020    Procedure: FUSION, SPINE, WITH INSTRUMENTATION L4-5;  Surgeon: Cholo Mena MD;  Location: [facility] [place] OR;  Service: Orthopedics;  Laterality: N/A;  [employer], [employer], DR DAMON, CO-SURGEON    FUSION OF SPINE WITH INSTRUMENTATION N/A 2/24/2022    Procedure: FUSION, SPINE, WITH INSTRUMENTATION;  Surgeon: Chool Mena MD;  Location: [facility] OR;  Service: Orthopedics;  Laterality: N/A;    HERNIA REPAIR      REMOVAL OF HARDWARE FROM SPINE N/A 2/11/2021    Procedure: REMOVAL, HARDWARE, SPINE L4-5;  Surgeon: Cholo Mena MD;  Location: [facility] [place] OR;  Service: Orthopedics;  Laterality: N/A;  [employer]    SPINE  SURGERY  2009       Family History:   Family History   Problem Relation Age of Onset    Hyperlipidemia Mother     Hypertension Mother     Hyperlipidemia Father     Hypertension Father        Social History:   Social History     Socioeconomic History    Marital status: Legally     Number of children: 3   Occupational History    Occupation: unemployed   Tobacco Use    Smoking status: Current Every Day Smoker     Packs/day: 1.00     Years: 19.00     Pack years: 19.00     Types: Cigarettes    Smokeless tobacco: Never Used   Substance and Sexual Activity    Alcohol use: Yes     Alcohol/week: 14.0 standard drinks     Types: 14 Cans of beer per week     Comment: 2 beers per night    Drug use: Yes     Types: Marijuana    Sexual activity: Yes     Partners: Female       Date of surgery:  02/24/2022    Review of Systems     General/Constitutional: Chills denies. Fatigue denies. Fever denies. Weight gain denies. Weight loss denies.    Musculoskeletal: Comments: See HPI for details    Skin: Rash denies.    Objective:   Vital Signs:   Vitals:    03/04/22 1003   Pulse: 80        Physical Exam    This a well-developed, well nourished patient in no acute distress.  They are alert and oriented and cooperative to examination.  Pt. walks without an antalgic gait.      General Examination:     Constitutional: The patient is alert and oriented to lace person and time. Mood is pleasant.     Head/Face: Normal facial features normal eyebrows    Eyes: Normal extraocular motion bilaterally    Lungs: Respirations are equal and unlabored    Gait is coordinated.    Cardiovascular: There are no swelling or varicosities present.    Lymphatic: Negative for adenopathy    Skin: Normal    Neurological: Level of consciousness normal. Oriented to place person and time and situation    Psychiatric: Oriented to time place person and situation    Cervical exam demonstrates well-healed anterior incision.  He continues to have a forward  head posture.  There is a significant amount of resolving ecchymosis throughout his anterior thoracic cavity superficially.  He does have an intention tremor of his upper extremities.  Cervical range of motion remains markedly diminished in all planes mobility.  Bilateral upper extremities are distal neurovascular intact.  There is no gurgling and his voice is not hoarse.    XRAY Report/ Interpretation:  Postop cervical AP and lateral x-rays taken in the office today reviewed the patient demonstrate status post C5-6 and C6-7 ACDF with anterior plate.      Assessment:       1. S/P cervical spinal fusion        Plan:       Josh was seen today for post-op evaluation.    Diagnoses and all orders for this visit:    S/P cervical spinal fusion  -     X-Ray Cervical Spine AP And Lateral         No follow-ups on file.    I am really not sure what caused this amount of ecchymosis; I have never seen this before status post ACDF.  I am not sure because the postoperative gurgling.  But when I put both of these together I can not imagine that he had a postoperative hematoma.  This is not unusual with this type of surgery.  I am glad that it has resolved and that he has no residual symptoms.  I am glad that his symptoms are improved postoperatively.  At this time he can continue to increase his activity but not lift anything greater than 10 or 15 lb.  Follow-up in 4 weeks or sooner if needed.    Treatment options were discussed with regards to the nature of the medical condition. Conservative pain intervention and surgical options were discussed in detail. The probability of success of each separate treatment option was discussed. The patient expressed a clear understanding of the treatment options. With regards to surgery, the procedure risk, benefits, complications, and outcomes were discussed. No guarantees were given with regards to surgical outcome.   The risk of complications, morbidity, and mortality of patient management  decisions have been made at the time of this visit. These are associated with the patient's problems, diagnostic procedures and treatment options. This includes the possible management options selected and those considered but not selected by the patient after shared medical decision making we discussed with the patient.     This note was created using Dragon voice recognition software that occasionally misinterpreted phrases or words.

## 2022-03-10 ENCOUNTER — TELEPHONE (OUTPATIENT)
Dept: ORTHOPEDICS | Facility: HOSPITAL | Age: 52
End: 2022-03-10
Payer: MEDICAID

## 2022-03-10 DIAGNOSIS — Z98.1 S/P CERVICAL SPINAL FUSION: ICD-10-CM

## 2022-03-10 RX ORDER — OXYCODONE AND ACETAMINOPHEN 10; 325 MG/1; MG/1
1 TABLET ORAL EVERY 4 HOURS PRN
Qty: 40 TABLET | Refills: 0 | Status: SHIPPED | OUTPATIENT
Start: 2022-03-10 | End: 2022-03-17

## 2022-03-10 NOTE — TELEPHONE ENCOUNTER
----- Message from Kezia Giang sent at 3/10/2022  7:36 AM CST -----  Phone #: [phone]  Patient is requesting a refill of Oxycodone-Dr Mena          Pharmacy:   [facility] [place]  [id number]  [id number]  [place] [address]  Phone: [phone] Fax: [phone]

## 2022-03-17 ENCOUNTER — TELEPHONE (OUTPATIENT)
Dept: ORTHOPEDICS | Facility: CLINIC | Age: 52
End: 2022-03-17
Payer: MEDICAID

## 2022-03-17 DIAGNOSIS — M50.30 DDD (DEGENERATIVE DISC DISEASE), CERVICAL: Primary | ICD-10-CM

## 2022-03-17 RX ORDER — HYDROCODONE BITARTRATE AND ACETAMINOPHEN 10; 325 MG/1; MG/1
1 TABLET ORAL EVERY 6 HOURS PRN
Qty: 28 TABLET | Refills: 0 | Status: SHIPPED | OUTPATIENT
Start: 2022-03-17 | End: 2022-04-20 | Stop reason: SDUPTHER

## 2022-03-17 NOTE — TELEPHONE ENCOUNTER
----- Message from Radha Mario sent at 3/17/2022 12:48 PM CDT -----  Regarding: RX Request  Phone #: [phone]  Patient is requesting a refill of [place] 10 MG  Pharmacy:   [facility] [place]  [address]  [id number]  [place] [address]  Phone: [phone] Fax: [phone]           That's fine

## 2022-03-17 NOTE — TELEPHONE ENCOUNTER
----- Message from Kacy Ruiz LPN sent at 3/17/2022  1:04 PM CDT -----  Regarding: FW: RX Request  Heriberto, his last fill was percocet 10... Radha said he asked for norco 10. He is 1 month post-op do you want to decrease down to norco 10 or lower the percocet dose? Just wanted to be sure.  ----- Message -----  From: Radha Mario  Sent: 3/17/2022  12:49 PM CDT  To: Kacy Ruiz LPN  Subject: RX Request                                       Phone #: [phone]  Patient is requesting a refill of [place] 10 MG  Pharmacy:   [facility] [place]  [id number]  [id number]  [place] [address]  Phone: [phone] Fax: [phone]

## 2022-03-25 ENCOUNTER — TELEPHONE (OUTPATIENT)
Dept: ORTHOPEDICS | Facility: CLINIC | Age: 52
End: 2022-03-25
Payer: MEDICAID

## 2022-03-25 DIAGNOSIS — Z98.1 S/P CERVICAL SPINAL FUSION: Primary | ICD-10-CM

## 2022-03-25 RX ORDER — OXYCODONE AND ACETAMINOPHEN 5; 325 MG/1; MG/1
1 TABLET ORAL EVERY 6 HOURS PRN
Qty: 28 TABLET | Refills: 0 | Status: SHIPPED | OUTPATIENT
Start: 2022-03-25 | End: 2022-03-28

## 2022-03-25 NOTE — TELEPHONE ENCOUNTER
----- Message from JP Monteiro sent at 3/25/2022  8:23 AM CDT -----  Patient needs to follow up next week.    I will send Rx Percocet 5    ----- Message -----  From: Zaria Gonsalez  Sent: 3/24/2022  12:23 PM CDT  To: JP Monteiro    Phone #: [phone]  Patient is requesting a refill of  Oxy 10mg  [place]   [facility] [address]  [address]  [place] [facility] [address]  Phone: [phone] Fax: [phone]    Patient states Norco is not working.  He is requesting to go back to Oxy.  He is in a lot of pain.  Pain scale 10.  Throat hurts so bad he cant really eat.  Patients phone number is [phone]

## 2022-03-25 NOTE — TELEPHONE ENCOUNTER
----- Message from Zaria Gonsalez sent at 3/24/2022 12:19 PM CDT -----  Phone #: [phone]  Patient is requesting a refill of  Oxy 10mg  Pharmacy:   [facility] [place]  [address]  [address] [email or web address]  [place] [address]  Phone: [phone] Fax: [phone]    Patient states Norco is not working.  He is requesting to go back to Oxy.  He is in a lot of pain.  Pain scale 10.  Throat hurts so bad he cant really eat.  Patients phone number is [phone]

## 2022-03-28 ENCOUNTER — OFFICE VISIT (OUTPATIENT)
Dept: ORTHOPEDICS | Facility: CLINIC | Age: 52
End: 2022-03-28
Payer: COMMERCIAL

## 2022-03-28 VITALS — HEIGHT: 76 IN | WEIGHT: 165 LBS | BODY MASS INDEX: 20.09 KG/M2

## 2022-03-28 DIAGNOSIS — R13.10 DYSPHAGIA, UNSPECIFIED TYPE: ICD-10-CM

## 2022-03-28 DIAGNOSIS — Z98.1 S/P CERVICAL SPINAL FUSION: Primary | ICD-10-CM

## 2022-03-28 PROCEDURE — 99024 POSTOP FOLLOW-UP VISIT: CPT | Mod: S$GLB,,, | Performed by: ORTHOPAEDIC SURGERY

## 2022-03-28 PROCEDURE — 3008F PR BODY MASS INDEX (BMI) DOCUMENTED: ICD-10-PCS | Mod: S$GLB,,, | Performed by: ORTHOPAEDIC SURGERY

## 2022-03-28 PROCEDURE — 3008F BODY MASS INDEX DOCD: CPT | Mod: S$GLB,,, | Performed by: ORTHOPAEDIC SURGERY

## 2022-03-28 PROCEDURE — 1160F PR REVIEW ALL MEDS BY PRESCRIBER/CLIN PHARMACIST DOCUMENTED: ICD-10-PCS | Mod: S$GLB,,, | Performed by: ORTHOPAEDIC SURGERY

## 2022-03-28 PROCEDURE — 1160F RVW MEDS BY RX/DR IN RCRD: CPT | Mod: S$GLB,,, | Performed by: ORTHOPAEDIC SURGERY

## 2022-03-28 PROCEDURE — 99024 PR POST-OP FOLLOW-UP VISIT: ICD-10-PCS | Mod: S$GLB,,, | Performed by: ORTHOPAEDIC SURGERY

## 2022-03-28 RX ORDER — OXYCODONE AND ACETAMINOPHEN 10; 325 MG/1; MG/1
1 TABLET ORAL EVERY 6 HOURS PRN
Qty: 28 TABLET | Refills: 0 | Status: SHIPPED | OUTPATIENT
Start: 2022-03-28 | End: 2022-04-04

## 2022-03-28 NOTE — PROGRESS NOTES
Subjective:    Patient ID: Josh Coulter is a 51 y.o. male.    Chief Complaint: Post-op Evaluation of the Neck ( C5-6, C6-7 ACDF 02/24/2022, patient isn't doing too well, he is in severe pain, pain is on the right side, trouble eating.)      History of Present Illness    Prior to meeting with the patient I reviewed the medical chart in [facility]. This included reviewing the previous progress notes from our office, review of the patient's last appointment with their primary care provider, review of any visits to the emergency room, and review of any pain management appointments or procedures.  Patient is here about a month status post C5-6 and C6-7 ACDF.    Patient plainly says in when he was taken Percocet 10/325 he was having minimal symptomatology.  Since we have weaned the dose down to Percocet 5/325 he now complains of some increased pain right side of his neck occipital, sternocleidomastoid, and upper trapezius.  Also complains of some increased difficulty swallowing and some type of transient obstruction in his airway that leads to snoring when awake.    The anterior chest ecchymosis has resolved over time.    Current Medications  Current Outpatient Medications   Medication Sig Dispense Refill    cyclobenzaprine (FLEXERIL) 10 MG tablet Take 1 tablet (10 mg total) by mouth 3 (three) times daily. 90 tablet 2    DULoxetine (CYMBALTA) 60 MG capsule TAKE ONE CAPSULE (60MG) BY MOUTH ONCE DAILY 30 capsule 5    meloxicam (MOBIC) 15 MG tablet Take 1 tablet (15 mg total) by mouth once daily. 30 tablet 2    MOVANTIK 25 mg tablet TAKE ONE TABLET (25MG) BY MOUTH ONCE DAILY 30 tablet 0    oxyCODONE-acetaminophen (PERCOCET) 5-325 mg per tablet Take 1 tablet by mouth every 6 (six) hours as needed for Pain. 28 tablet 0    tamsulosin (FLOMAX) 0.4 mg Cap TAKE TWO CAPSULES (0.8 MG TOTAL) BY MOUTH ONCE DAILY 60 capsule 3    cetirizine (ZYRTEC) 10 MG tablet TAKE ONE TABLET (10MG) BY MOUTH EVERY EVENING (Patient not taking:  Reported on 3/28/2022) 30 tablet 3    HYDROcodone-acetaminophen (NORCO)  mg per tablet Take 1 tablet by mouth every 6 (six) hours as needed for Pain. (Patient not taking: Reported on 3/28/2022) 28 tablet 0    pregabalin (LYRICA) 50 MG capsule        No current facility-administered medications for this visit.       Allergies  Review of patient's allergies indicates:   Allergen Reactions    Lyrica [pregabalin] Other (See Comments)     Patient complains of suicidal ideation with this medication    Contrast media Other (See Comments)     syncopy       Past Medical History  Past Medical History:   Diagnosis Date    Arthritis     Digestive disorder     Hyponatremia     Syncope     Wears glasses        Surgical History  Past Surgical History:   Procedure Laterality Date    ANTERIOR CERVICAL DISCECTOMY W/ FUSION N/A 2/24/2022    Procedure: DISCECTOMY, SPINE, CERVICAL, ANTERIOR APPROACH, WITH FUSION;  Surgeon: Cholo Mnea MD;  Location: [facility] OR;  Service: Orthopedics;  Laterality: N/A;  IOM, MEDTRONICTobin Cárdenas notified on 2/23, [facility] attachment head piece, head halter chin strap, posterior cervical support, and christophe posterior cervical spine support    ANTERIOR LUMBAR INTERBODY FUSION (ALIF) N/A 3/31/2020    Procedure: FUSION, SPINE, LUMBAR, ALIF L4-5;  Surgeon: Cholo Mena MD;  Location: [facility] OR;  Service: Orthopedics;  Laterality: N/A;    BONE GRAFT N/A 3/31/2020    Procedure: BONE GRAFT;  Surgeon: Cholo Mena MD;  Location: [facility] OR;  Service: Orthopedics;  Laterality: N/A;    BONE GRAFT N/A 2/24/2022    Procedure: BONE GRAFT;  Surgeon: Cholo Mena MD;  Location: [facility] OR;  Service: Orthopedics;  Laterality: N/A;    FUSION OF SPINE WITH INSTRUMENTATION N/A 3/31/2020    Procedure: FUSION, SPINE, WITH INSTRUMENTATION L4-5;  Surgeon: Cholo Mena MD;  Location: [facility] OR;  Service: Orthopedics;  Laterality: N/A;  NTI, [employer], DR DAMON, CO-SURGEON     FUSION OF SPINE WITH INSTRUMENTATION N/A 2/24/2022    Procedure: FUSION, SPINE, WITH INSTRUMENTATION;  Surgeon: Cholo Mena MD;  Location: [facility] OR;  Service: Orthopedics;  Laterality: N/A;    HERNIA REPAIR      REMOVAL OF HARDWARE FROM SPINE N/A 2/11/2021    Procedure: REMOVAL, HARDWARE, SPINE L4-5;  Surgeon: Cholo Mena MD;  Location: [facility] OR;  Service: Orthopedics;  Laterality: N/A;  [employer]    SPINE SURGERY  2009       Family History:   Family History   Problem Relation Age of Onset    Hyperlipidemia Mother     Hypertension Mother     Hyperlipidemia Father     Hypertension Father        Social History:   Social History     Socioeconomic History    Marital status: Legally     Number of children: 3   Occupational History    Occupation: unemployed   Tobacco Use    Smoking status: Current Every Day Smoker     Packs/day: 1.00     Years: 19.00     Pack years: 19.00     Types: Cigarettes    Smokeless tobacco: Never Used   Substance and Sexual Activity    Alcohol use: Yes     Alcohol/week: 14.0 standard drinks     Types: 14 Cans of beer per week     Comment: 2 beers per night    Drug use: Yes     Types: Marijuana    Sexual activity: Yes     Partners: Female       Date of surgery:  02/24/2022    Review of Systems     General/Constitutional: Chills denies. Fatigue denies. Fever denies. Weight gain denies. Weight loss denies.    Musculoskeletal: Comments: See HPI for details    Skin: Rash denies.    Objective:   Vital Signs: There were no vitals filed for this visit.     Physical Exam    This a well-developed, well nourished patient in no acute distress.  They are alert and oriented and cooperative to examination.  Pt. walks without an antalgic gait.      General Examination:     Constitutional: The patient is alert and oriented to lace person and time. Mood is pleasant.     Head/Face: Normal facial features normal eyebrows    Eyes: Normal extraocular motion bilaterally    Lungs:  "Respirations are equal and unlabored    Gait is coordinated.    Cardiovascular: There are no swelling or varicosities present.    Lymphatic: Negative for adenopathy    Skin: Normal    Neurological: Level of consciousness normal. Oriented to place person and time and situation    Psychiatric: Oriented to time place person and situation    Cervical exam demonstrates well-healed anterior incision.  Mild scar tissue.  He does have a forward head posture.  He presents wearing a rigid collar.  Cervical range of motion diminished in all planes secondary to guarding.  Bilateral upper extremities are distal neurovascular intact with some mild give-way weakness secondary to guarding and pain.    XRAY Report/ Interpretation:  Postop cervical AP and lateral x-rays taken in the office today reviewed the patient demonstrate excellent placement of interbody cages and anterior plate at C5-6 and C6-7.  He does have some hyperflexion and anterolisthesis at the adjacent level C4-5 which causes his forward head posture.  Also demonstrates some moderate prevertebral swelling.      Assessment:       1. S/P cervical spinal fusion    2. Dysphagia, unspecified type        Plan:       Josh was seen today for post-op evaluation.    Diagnoses and all orders for this visit:    S/P cervical spinal fusion  -     X-Ray Cervical Spine AP And Lateral    Dysphagia, unspecified type         Follow up for  PO //'Xray// C5-6, C6-7 ACDF 02/24/2022.   A physician's assistant Pete Guzmán served in the capacity as a "scribe" for this patient encounter  A "face to face" encounter occurred with Dr. Mena on this date  The treatment plan and medical decision making is outlined below:  And light of his current dysphagia and the fact that he had and identified postoperative hematoma and a lot of anterior chest and cervical ecchymosis 2 weeks postop we feel like this is all related to a postoperative hematoma.  Dr. Mena recommends an MRI of the cervical " spine to assess this.  Follow-up in 2 weeks.  If symptoms persist consider ENT evaluation and/or referral for cervical medial branch blocks.  May discontinue cervical collar.    Treatment options were discussed with regards to the nature of the medical condition. Conservative pain intervention and surgical options were discussed in detail. The probability of success of each separate treatment option was discussed. The patient expressed a clear understanding of the treatment options. With regards to surgery, the procedure risk, benefits, complications, and outcomes were discussed. No guarantees were given with regards to surgical outcome.   The risk of complications, morbidity, and mortality of patient management decisions have been made at the time of this visit. These are associated with the patient's problems, diagnostic procedures and treatment options. This includes the possible management options selected and those considered but not selected by the patient after shared medical decision making we discussed with the patient.     This note was created using Dragon voice recognition software that occasionally misinterpreted phrases or words.

## 2022-04-04 ENCOUNTER — TELEPHONE (OUTPATIENT)
Dept: ORTHOPEDICS | Facility: CLINIC | Age: 52
End: 2022-04-04
Payer: MEDICAID

## 2022-04-04 DIAGNOSIS — Z98.1 S/P CERVICAL SPINAL FUSION: Primary | ICD-10-CM

## 2022-04-04 RX ORDER — OXYCODONE AND ACETAMINOPHEN 7.5; 325 MG/1; MG/1
1 TABLET ORAL EVERY 6 HOURS PRN
Qty: 28 TABLET | Refills: 0 | Status: SHIPPED | OUTPATIENT
Start: 2022-04-04 | End: 2022-04-11

## 2022-04-04 NOTE — TELEPHONE ENCOUNTER
----- Message from Kezia Giang sent at 4/4/2022 10:39 AM CDT -----  Phone #: [phone]  Patient is requesting a refill of Oxycodone        Pharmacy:   [facility] [place]  [address]  [address]  [place] [address]  Phone: [phone] Fax: [phone]

## 2022-04-11 ENCOUNTER — TELEPHONE (OUTPATIENT)
Dept: ORTHOPEDICS | Facility: CLINIC | Age: 52
End: 2022-04-11

## 2022-04-11 DIAGNOSIS — Z98.1 S/P CERVICAL SPINAL FUSION: Primary | ICD-10-CM

## 2022-04-11 RX ORDER — OXYCODONE AND ACETAMINOPHEN 5; 325 MG/1; MG/1
1 TABLET ORAL EVERY 6 HOURS PRN
Qty: 28 TABLET | Refills: 0 | Status: SHIPPED | OUTPATIENT
Start: 2022-04-11 | End: 2022-04-18 | Stop reason: SDUPTHER

## 2022-04-11 NOTE — TELEPHONE ENCOUNTER
----- Message from Cherelle Davies sent at 4/11/2022 11:04 AM CDT -----  Phone #: [phone]  Patient is requesting a refill of Percocet 7-5-325mg              [facility]   [facility] [place]  [address]  [address]  [place] [address]  Phone: [phone] Fax: [phone]

## 2022-04-18 ENCOUNTER — TELEPHONE (OUTPATIENT)
Dept: ORTHOPEDICS | Facility: CLINIC | Age: 52
End: 2022-04-18

## 2022-04-18 ENCOUNTER — HOSPITAL ENCOUNTER (OUTPATIENT)
Dept: RADIOLOGY | Facility: HOSPITAL | Age: 52
Discharge: HOME | End: 2022-04-18
Attending: ORTHOPAEDIC SURGERY
Payer: COMMERCIAL

## 2022-04-18 DIAGNOSIS — Z98.1 S/P CERVICAL SPINAL FUSION: ICD-10-CM

## 2022-04-18 DIAGNOSIS — R13.10 DYSPHAGIA, UNSPECIFIED TYPE: ICD-10-CM

## 2022-04-18 PROCEDURE — 72141 MRI NECK SPINE W/O DYE: CPT | Mod: TC,PO

## 2022-04-18 RX ORDER — OXYCODONE AND ACETAMINOPHEN 5; 325 MG/1; MG/1
1 TABLET ORAL EVERY 8 HOURS PRN
Qty: 21 TABLET | Refills: 0 | Status: SHIPPED | OUTPATIENT
Start: 2022-04-18 | End: 2022-04-27

## 2022-04-18 NOTE — TELEPHONE ENCOUNTER
Will start to decrease to Percocet 5-325 t.i.d. p.r.n.    ----- Message from Kezia Giang sent at 4/18/2022 10:05 AM CDT -----  Phone #: [phone]  Patient is requesting a refill of Oxycodone          Pharmacy:   [facility] [place]  [address]  [address]  [place] [id number]  Phone: [phone] Fax: [phone]

## 2022-04-20 ENCOUNTER — OFFICE VISIT (OUTPATIENT)
Dept: ORTHOPEDICS | Facility: CLINIC | Age: 52
End: 2022-04-20
Payer: COMMERCIAL

## 2022-04-20 VITALS — HEIGHT: 76 IN | BODY MASS INDEX: 20.09 KG/M2 | WEIGHT: 165 LBS

## 2022-04-20 DIAGNOSIS — R49.0 DYSPHONIA: Primary | ICD-10-CM

## 2022-04-20 DIAGNOSIS — Z98.1 S/P CERVICAL SPINAL FUSION: ICD-10-CM

## 2022-04-20 DIAGNOSIS — R13.10 DYSPHAGIA, UNSPECIFIED TYPE: ICD-10-CM

## 2022-04-20 DIAGNOSIS — M50.30 DDD (DEGENERATIVE DISC DISEASE), CERVICAL: ICD-10-CM

## 2022-04-20 PROCEDURE — 3008F BODY MASS INDEX DOCD: CPT | Mod: S$GLB,,, | Performed by: ORTHOPAEDIC SURGERY

## 2022-04-20 PROCEDURE — 99024 PR POST-OP FOLLOW-UP VISIT: ICD-10-PCS | Mod: S$GLB,,, | Performed by: ORTHOPAEDIC SURGERY

## 2022-04-20 PROCEDURE — 3008F PR BODY MASS INDEX (BMI) DOCUMENTED: ICD-10-PCS | Mod: S$GLB,,, | Performed by: ORTHOPAEDIC SURGERY

## 2022-04-20 PROCEDURE — 1160F RVW MEDS BY RX/DR IN RCRD: CPT | Mod: S$GLB,,, | Performed by: ORTHOPAEDIC SURGERY

## 2022-04-20 PROCEDURE — 1160F PR REVIEW ALL MEDS BY PRESCRIBER/CLIN PHARMACIST DOCUMENTED: ICD-10-PCS | Mod: S$GLB,,, | Performed by: ORTHOPAEDIC SURGERY

## 2022-04-20 PROCEDURE — 99024 POSTOP FOLLOW-UP VISIT: CPT | Mod: S$GLB,,, | Performed by: ORTHOPAEDIC SURGERY

## 2022-04-20 RX ORDER — HYDROCODONE BITARTRATE AND ACETAMINOPHEN 10; 325 MG/1; MG/1
1 TABLET ORAL EVERY 6 HOURS PRN
Qty: 28 TABLET | Refills: 0 | Status: SHIPPED | OUTPATIENT
Start: 2022-04-20 | End: 2022-04-27

## 2022-04-20 RX ORDER — TIZANIDINE 4 MG/1
4 TABLET ORAL EVERY 6 HOURS PRN
Qty: 90 TABLET | Refills: 2 | Status: SHIPPED | OUTPATIENT
Start: 2022-04-20 | End: 2022-07-19

## 2022-04-20 NOTE — PROGRESS NOTES
Subjective:    Patient ID: Josh Coulter is a 51 y.o. male.    Chief Complaint: Post-op Evaluation of the Neck (C5-6, C6-7 ACDF 02/24/2022, Cervical MRI 04/18/2022, patient is feeling horrible, throat is hurting, he is here for results. )      History of Present Illness    Prior to meeting with the patient I reviewed the medical chart in [facility]. This included reviewing the previous progress notes from our office, review of the patient's last appointment with their primary care provider, review of any visits to the emergency room, and review of any pain management appointments or procedures.  Has an updated cervical MRI to review.  He is essentially 8 weeks postop.  He continues to complain of some significant postoperative muscle soreness and pain on the right side of his neck.  He also continues to have some unusual postoperative symptoms such as drooling and some dysphonia that is not typical of a I recurrent laryngeal nerve injury.  He demonstrates a deep and raspy voice rather than a weak voice; a weak voice would be more demonstrable of a recurrent laryngeal nerve injury.  He had some of these unusual symptoms postoperatively and in fact the hospital wanted to keep him an extra day or 2 beyond his extended postoperative stay for further testing; the patient refused and we discharged him to go home.  We have previously discussed referral back to speech therapy and to an ENT.  We were waiting to get the results of this updated cervical MRI.    Current Medications  Current Outpatient Medications   Medication Sig Dispense Refill    cetirizine (ZYRTEC) 10 MG tablet TAKE ONE TABLET (10MG) BY MOUTH EVERY EVENING 30 tablet 3    cyclobenzaprine (FLEXERIL) 10 MG tablet Take 1 tablet (10 mg total) by mouth 3 (three) times daily. 90 tablet 2    DULoxetine (CYMBALTA) 60 MG capsule TAKE ONE CAPSULE (60MG) BY MOUTH ONCE DAILY 30 capsule 5    meloxicam (MOBIC) 15 MG tablet Take 1 tablet (15 mg total) by mouth once daily.  30 tablet 2    MOVANTIK 25 mg tablet TAKE ONE TABLET (25MG) BY MOUTH ONCE DAILY 30 tablet 0    oxyCODONE-acetaminophen (PERCOCET) 5-325 mg per tablet Take 1 tablet by mouth every 8 (eight) hours as needed for Pain. 21 tablet 0    pregabalin (LYRICA) 50 MG capsule       tamsulosin (FLOMAX) 0.4 mg Cap TAKE TWO CAPSULES (0.8 MG TOTAL) BY MOUTH ONCE DAILY 60 capsule 3    HYDROcodone-acetaminophen (NORCO)  mg per tablet Take 1 tablet by mouth every 6 (six) hours as needed for Pain. (Patient not taking: No sig reported) 28 tablet 0     No current facility-administered medications for this visit.       Allergies  Review of patient's allergies indicates:   Allergen Reactions    Lyrica [pregabalin] Other (See Comments)     Patient complains of suicidal ideation with this medication    Contrast media Other (See Comments)     syncopy       Past Medical History  Past Medical History:   Diagnosis Date    Arthritis     Digestive disorder     Hyponatremia     Syncope     Wears glasses        Surgical History  Past Surgical History:   Procedure Laterality Date    ANTERIOR CERVICAL DISCECTOMY W/ FUSION N/A 2/24/2022    Procedure: DISCECTOMY, SPINE, CERVICAL, ANTERIOR APPROACH, WITH FUSION;  Surgeon: Cholo Mena MD;  Location: [facility];  Service: Orthopedics;  Laterality: N/A;  [facility], [employer]- Segun and Shamar notified on 2/23, [facility] attachment head piece, head halter chin strap, posterior cervical support, and christophe posterior cervical spine support    ANTERIOR LUMBAR INTERBODY FUSION (ALIF) N/A 3/31/2020    Procedure: FUSION, SPINE, LUMBAR, ALIF L4-5;  Surgeon: Cholo Mena MD;  Location: [facility] OR;  Service: Orthopedics;  Laterality: N/A;    BONE GRAFT N/A 3/31/2020    Procedure: BONE GRAFT;  Surgeon: Cholo Mena MD;  Location: [facility] OR;  Service: Orthopedics;  Laterality: N/A;    BONE GRAFT N/A 2/24/2022    Procedure: BONE GRAFT;  Surgeon: Cholo Mena MD;  Location: [facility] OR;   Service: Orthopedics;  Laterality: N/A;    FUSION OF SPINE WITH INSTRUMENTATION N/A 3/31/2020    Procedure: FUSION, SPINE, WITH INSTRUMENTATION L4-5;  Surgeon: Cholo Mena MD;  Location: [facility] OR;  Service: Orthopedics;  Laterality: N/A;  NTI, [employer], DR DAMON, CO-SURGEON    FUSION OF SPINE WITH INSTRUMENTATION N/A 2/24/2022    Procedure: FUSION, SPINE, WITH INSTRUMENTATION;  Surgeon: Cholo Mena MD;  Location[facility];  Service: Orthopedics;  Laterality: N/A;    HERNIA REPAIR      REMOVAL OF HARDWARE FROM SPINE N/A 2/11/2021    Procedure: REMOVAL, HARDWARE, SPINE L4-5;  Surgeon: Cholo Mena MD;  Location: [facility] OR;  Service: Orthopedics;  Laterality: N/A;  [employer]    SPINE SURGERY  2009       Family History:   Family History   Problem Relation Age of Onset    Hyperlipidemia Mother     Hypertension Mother     Hyperlipidemia Father     Hypertension Father        Social History:   Social History     Socioeconomic History    Marital status: Legally     Number of children: 3   Occupational History    Occupation: unemployed   Tobacco Use    Smoking status: Current Every Day Smoker     Packs/day: 1.00     Years: 19.00     Pack years: 19.00     Types: Cigarettes    Smokeless tobacco: Never Used   Substance and Sexual Activity    Alcohol use: Yes     Alcohol/week: 14.0 standard drinks     Types: 14 Cans of beer per week     Comment: 2 beers per night    Drug use: Yes     Types: Marijuana    Sexual activity: Yes     Partners: Female       Date of surgery:  02/24/2022    Review of Systems     General/Constitutional: Chills denies. Fatigue denies. Fever denies. Weight gain denies. Weight loss denies.    Musculoskeletal: Comments: See HPI for details    Skin: Rash denies.    Objective:   Vital Signs: There were no vitals filed for this visit.     Physical Exam    This a well-developed, well nourished patient in no acute distress.  They are alert and oriented and cooperative to  examination.  Pt. walks without an antalgic gait.      General Examination:     Constitutional: The patient is alert and oriented to lace person and time. Mood is pleasant.     Head/Face: Normal facial features normal eyebrows    Eyes: Normal extraocular motion bilaterally    Lungs: Respirations are equal and unlabored    Gait is coordinated.    Cardiovascular: There are no swelling or varicosities present.    Lymphatic: Negative for adenopathy    Skin: Normal    Neurological: Level of consciousness normal. Oriented to place person and time and situation    Psychiatric: Oriented to time place person and situation       Cervical exam demonstrates well-healed anterior incision.  Mild scar tissue.  He does have a forward head posture.  He presents wearing a rigid collar.  Cervical range of motion diminished in all planes secondary to guarding.  Bilateral upper extremities are distal neurovascular intact with some mild give-way weakness secondary to guarding and pain.    XRAY Report/ Interpretation:  Postop cervical AP and lateral x-rays taken in the office today reviewed the patient demonstrate stable C5-6 and C6-7 ACDF.  He does have a kyphotic deformity above his fusion at C4-5.    Cervical MRI reviewed with the patient and Dr. Mena in office today demonstrates previous fusion from C5 through C7.  Reversal of the normal cervical lordosis.  Multilevel facet arthropathy and a right posterolateral disc herniation at C6-7 causing some foraminal stenosis when combined with the facet arthropathy at that level.      Assessment:       1. Dysphonia    2. S/P cervical spinal fusion    3. DDD (degenerative disc disease), cervical    4. Dysphagia, unspecified type        Plan:       Josh was seen today for post-op evaluation.    Diagnoses and all orders for this visit:    Dysphonia  The following orders have not been finalized:  -     tiZANidine (ZANAFLEX) 4 MG tablet    S/P cervical spinal fusion  -     X-Ray Cervical Spine  AP And Lateral  The following orders have not been finalized:  -     tiZANidine (ZANAFLEX) 4 MG tablet    DDD (degenerative disc disease), cervical  The following orders have not been finalized:  -     tiZANidine (ZANAFLEX) 4 MG tablet  -     HYDROcodone-acetaminophen (NORCO)  mg per tablet    Dysphagia, unspecified type  The following orders have not been finalized:  -     tiZANidine (ZANAFLEX) 4 MG tablet         Follow up for C5-6, C6-7 ACDF 02/24/2022, .  This is to attest that the physician's assistant Pete Guzmán served in the capacity as a scribe for this patient encounter.  This is also to verify that I have reviewed the patient's history and helped formulate the treatment plan and discussed it with the physician's assistant .  I have actively participated and evaluation and treatment plan for this patient visit.  The treatment plan and medical decision-making is outlined below:  We changes medications up a little bit.  We discontinued the Percocet instead switch him to [place] 5/325 q.6 hours p.r.n..  We also refilled a prescription of Zanaflex for the patient.  We feel like he should continue increasing activity as tolerated but that he definitely needs to follow-up as an outpatient for speech therapy and to be evaluated by ENT.  Follow-up in 4 weeks to reassess.  Treatment options were discussed with regards to the nature of the medical condition. Conservative pain intervention and surgical options were discussed in detail. The probability of success of each separate treatment option was discussed. The patient expressed a clear understanding of the treatment options. With regards to surgery, the procedure risk, benefits, complications, and outcomes were discussed. No guarantees were given with regards to surgical outcome.   The risk of complications, morbidity, and mortality of patient management decisions have been made at the time of this visit. These are associated with the patient's  problems, diagnostic procedures and treatment options. This includes the possible management options selected and those considered but not selected by the patient after shared medical decision making we discussed with the patient.     This note was created using Dragon voice recognition software that occasionally misinterpreted phrases or words.

## 2022-04-27 ENCOUNTER — TELEPHONE (OUTPATIENT)
Dept: ORTHOPEDICS | Facility: CLINIC | Age: 52
End: 2022-04-27

## 2022-04-27 DIAGNOSIS — M50.30 DDD (DEGENERATIVE DISC DISEASE), CERVICAL: ICD-10-CM

## 2022-04-27 RX ORDER — HYDROCODONE BITARTRATE AND ACETAMINOPHEN 7.5; 325 MG/1; MG/1
1 TABLET ORAL EVERY 6 HOURS PRN
Qty: 28 TABLET | Refills: 0 | Status: SHIPPED | OUTPATIENT
Start: 2022-04-27 | End: 2022-05-04 | Stop reason: SDUPTHER

## 2022-04-27 NOTE — TELEPHONE ENCOUNTER
----- Message from Gaby Preston sent at 4/27/2022 10:34 AM CDT -----  Regarding: RX Refill  Phone #: [phone]  Patient is requesting a refill of [place] 10 - 325 mg   Pharmacy:   [facility] [place]  [address]  [address]  [place] [address]  Phone: [phone] Fax: [phone]

## 2022-05-03 ENCOUNTER — TELEPHONE (OUTPATIENT)
Dept: ORTHOPEDICS | Facility: CLINIC | Age: 52
End: 2022-05-03

## 2022-05-03 NOTE — TELEPHONE ENCOUNTER
----- Message from Radha Mario sent at 5/2/2022 11:04 AM CDT -----  Regarding: ENT Referral  Can we call the patient with the referral information regarding the ENT we are referring to? Call him back at [phone].     [facility]    Hematology/Oncology Established Patient Note      Today's Date: 08/10/2018    Reason for Follow-up: BRCA 2 mutation and DCIS      HISTORY OF PRESENT ILLNESS: Kimberly Miller is a 52 year old female with history of HTN, who presents with DCIS.  She has significant family history of breast and ovarian cancer.  She is BRCA2 positive, and has been followed closely previously by Dr. Reed for high-risk.  On a surveillance MRI breast on 12/8/15, there was abnormal finding in the right breast, and ultimately was found to have DCIS.  On 1/28/16, she underwent bilateral mastectomy by Dr. Alexander at [facility], with pathology showing DCIS of the right breast, grade 2 with occasional nuclear grade 3 cells, size 1.0 x 0.5 cm, negative for invasive carcinoma, 0/1 lymph node with carcinoma, ER positive at 75%, and AL positive at 20%.  She has undergone reconstruction.      She has history of SUMAYA-BSO in April 2008.  She notes that she still gets hot flashes occasionally.        INTERIM HISTORY: Kimberly comes in for follow-up today.  She has been feeling well.  She notes that she has been very busy with work and with her family life.  She and her  are moving one of her sons to college at [employer] next weekend.  Her daughter is getting  the weekend after that.  Her other son is in graduate school studying physical therapy.  She denies any new lumps/bumps or new pains.  She felt a tiny lump on her implant, and saw Dr. Valente, as she had revision and fat grafting surgery in November 2017.  It was felt that it was an expected changed from the surgery and reassured.      REVIEW OF SYSTEMS:   14 point ROS was reviewed and is negative other than as noted above in HPI.       HOME MEDICATIONS:  Current Outpatient Prescriptions   Medication Sig Dispense Refill     LISINOPRIL PO Take 10 mg by mouth daily       VITAMIN D, CHOLECALCIFEROL, PO Take 2,000 Units by mouth daily            ALLERGIES:  Allergies   Allergen Reactions     Penicillin [Penicillins] Rash         PAST MEDICAL HISTORY:  Past Medical History:   Diagnosis Date     Family history of breast cancer      Family history of ovarian cancer      Hypertension          PAST SURGICAL HISTORY:  Past Surgical History:   Procedure Laterality Date     BIOPSY BREAST Right 12/18/2015    MRI Biopsy     COLONOSCOPY  10/1/2012    Procedure: COLONOSCOPY;  Colonoscopy;  Surgeon: Karma Otaes MD;  Location: [facility]     HC BIOPSY BREAST, PERC NEEDLE CORE, NO IMAGING Right 1/9/2008     - Benign     HC REMOVAL GALLBLADDER  1993         SOCIAL HISTORY:  Social History     Social History     Marital status:      Spouse name: N/A     Number of children: N/A     Years of education: N/A     Occupational History     Not on file.     Social History Main Topics     Smoking status: Never Smoker     Smokeless tobacco: Never Used     Alcohol use Yes      Comment: rarely     Drug use: No     Sexual activity: Yes     Partners: Male     Other Topics Concern     Parent/Sibling W/ Cabg, Mi Or Angioplasty Before 65f 55m? No     Social History Narrative         FAMILY HISTORY:  Family History   Problem Relation Age of Onset     C.A.D. Mother      long QT syndrome     Genetic Disorder Father      + BRCA 2; no cancer     Cancer Father 81     CMML     Cancer Sister      breast- + BRCA 2     Cancer Brother      lung diagnosed age 36     Cancer Maternal Grandmother      ovarian     Cancer Maternal Grandfather      lung     Cancer Other      distant relative with breast cancer; male     Cancer Other      distant relative with breast cancer; female     Cancer Other      distant relative with breast cancer; female         PHYSICAL EXAM:  Vital signs:  /78 (BP Location: Left arm, Patient Position: Sitting, Cuff Size: Adult Large)  Pulse (!) 46  Temp 98.2  F (36.8  C) (Oral)  Resp 16  Wt 99.5 kg (219 lb 6.4 oz)  LMP 04/14/2008  SpO2 99%  BMI 36.12  kg/m2   ECO  GENERAL/CONSTITUTIONAL: No acute distress.  EYES: No scleral icterus.  LYMPH: No anterior cervical, posterior cervical, supraclavicular, or axillary adenopathy.   RESPIRATORY: Clear to auscultation bilaterally. No crackles or wheezing.   CARDIOVASCULAR: Regular rate and rhythm without murmurs, gallops, or rubs.  GASTROINTESTINAL: No tenderness. The patient has normal bowel sounds. No guarding.  No distention.  BREAST: s/p bilateral mastectomies with reconstruction.  MUSCULOSKELETAL: Warm and well-perfused.  NEUROLOGIC: Alert, oriented, answers questions appropriately.  INTEGUMENTARY: No rashes or jaundice.  GAIT: Steady, does not use assistive device      LABS:  CBC RESULTS:   Recent Labs   Lab Test  18   0815   WBC  8.0   RBC  4.58   HGB  14.6   HCT  41.8   MCV  91   MCH  31.9   MCHC  34.9   RDW  12.2   PLT  273     Recent Labs   Lab Test  18   0815  18   0820   NA  140  140   POTASSIUM  4.9  3.9   CHLORIDE  104  105   CO2  34*  29   ANIONGAP  2*  6   GLC  79  93   BUN  18  14   CR  0.72  0.79   MILY  9.0  8.7     Lab Results   Component Value Date    AST 9 2018     Lab Results   Component Value Date    ALT 21 2018     No results found for: BILICONJ   Lab Results   Component Value Date    BILITOTAL 0.7 2018     Lab Results   Component Value Date    ALBUMIN 3.6 2018     Lab Results   Component Value Date    PROTTOTAL 7.4 2018      Lab Results   Component Value Date    ALKPHOS 97 2018     Component      Latest Ref Rng & Units 2013   CA 27-29      0 - 39 U/mL 13 14 9 10     Component      Latest Ref Rng & Units 2018   CA 27-29      0 - 39 U/mL 13         ASSESSMENT/PLAN:  Kimberly Miller is a 52 year old post-menopausal female with:    1) DCIS of the right breast: ER positive, KS positive.  +BRCA2 mutation.  She is s/p bilateral mastectomy with reconstruction.  She has also had SUMAYA-BSO in .  We  previously discussed chemoprevention with tamoxifen or AI, weighing the benefits and risks/toxicities.  Ultimately, as she has had a risk-reducing bilateral mastectomy, she decided not to start hormonal therapy, and opting for surveillance with labs and tumor markers.  Will obtain imaging only if symptoms arise.    She has had no evidence of recurrence on exam.  Recent labs reviewed - unremarkable.  She would like to continue routine labs and tumor marker checks.  -RTC in 6 months with labs: CBC, CMP, CA 27-29    2) Breast reconstruction: She has fat grafting surgery in November 2017 and now doing much better.  She continues to do stretching exercises for lymphedema/scarring.  -follows with Dr. Valente        I spent a total of 25 minutes with the patient, with over >50% of the time in counseling and/or coordination of care.      Becky Matta MD  Hematology/Oncology  [facility]

## 2022-05-04 ENCOUNTER — TELEPHONE (OUTPATIENT)
Dept: ORTHOPEDICS | Facility: CLINIC | Age: 52
End: 2022-05-04

## 2022-05-04 DIAGNOSIS — M50.30 DDD (DEGENERATIVE DISC DISEASE), CERVICAL: ICD-10-CM

## 2022-05-04 RX ORDER — HYDROCODONE BITARTRATE AND ACETAMINOPHEN 7.5; 325 MG/1; MG/1
1 TABLET ORAL EVERY 6 HOURS PRN
Qty: 28 TABLET | Refills: 0 | Status: SHIPPED | OUTPATIENT
Start: 2022-05-04 | End: 2022-05-11 | Stop reason: SDUPTHER

## 2022-05-04 NOTE — TELEPHONE ENCOUNTER
Refilled    ----- Message from Cherelle Davies sent at 5/4/2022  8:20 AM CDT -----  Phone #: [phone]  Patient is requesting a refill of Norco 7.5-325mg                      Pharmacy:   [facility] [place]  [address]  [address]  [place] [address]  Phone: [phone] Fax: [phone]

## 2022-05-11 ENCOUNTER — TELEPHONE (OUTPATIENT)
Dept: ORTHOPEDICS | Facility: CLINIC | Age: 52
End: 2022-05-11

## 2022-05-11 DIAGNOSIS — M50.30 DDD (DEGENERATIVE DISC DISEASE), CERVICAL: ICD-10-CM

## 2022-05-11 RX ORDER — HYDROCODONE BITARTRATE AND ACETAMINOPHEN 7.5; 325 MG/1; MG/1
1 TABLET ORAL EVERY 6 HOURS PRN
Qty: 28 TABLET | Refills: 0 | Status: SHIPPED | OUTPATIENT
Start: 2022-05-11 | End: 2022-05-18 | Stop reason: SDUPTHER

## 2022-05-11 NOTE — TELEPHONE ENCOUNTER
----- Message from Gaby Preston sent at 5/11/2022  9:53 AM CDT -----  Regarding: RX Refill  Phone #: [phone]  Patient is requesting a refill of [place] 7.5 - 325 mg  Pharmacy:   [facility] [place]  [address]  [address]  [place] [address]  Phone: [phone] Fax: [phone]

## 2022-05-18 ENCOUNTER — OFFICE VISIT (OUTPATIENT)
Dept: ORTHOPEDICS | Facility: CLINIC | Age: 52
End: 2022-05-18
Payer: COMMERCIAL

## 2022-05-18 VITALS — HEIGHT: 76 IN | BODY MASS INDEX: 20.09 KG/M2 | WEIGHT: 165 LBS

## 2022-05-18 DIAGNOSIS — R49.0 DYSPHONIA: ICD-10-CM

## 2022-05-18 DIAGNOSIS — M48.02 CERVICAL STENOSIS OF SPINE: ICD-10-CM

## 2022-05-18 DIAGNOSIS — Z98.1 S/P CERVICAL SPINAL FUSION: Primary | ICD-10-CM

## 2022-05-18 DIAGNOSIS — M50.30 DDD (DEGENERATIVE DISC DISEASE), CERVICAL: ICD-10-CM

## 2022-05-18 DIAGNOSIS — R13.19 OTHER DYSPHAGIA: ICD-10-CM

## 2022-05-18 DIAGNOSIS — M54.12 CERVICAL RADICULITIS: ICD-10-CM

## 2022-05-18 PROCEDURE — 3008F PR BODY MASS INDEX (BMI) DOCUMENTED: ICD-10-PCS | Mod: CPTII,S$GLB,, | Performed by: ORTHOPAEDIC SURGERY

## 2022-05-18 PROCEDURE — 1159F MED LIST DOCD IN RCRD: CPT | Mod: CPTII,S$GLB,, | Performed by: ORTHOPAEDIC SURGERY

## 2022-05-18 PROCEDURE — 1160F RVW MEDS BY RX/DR IN RCRD: CPT | Mod: CPTII,S$GLB,, | Performed by: ORTHOPAEDIC SURGERY

## 2022-05-18 PROCEDURE — 1159F PR MEDICATION LIST DOCUMENTED IN MEDICAL RECORD: ICD-10-PCS | Mod: CPTII,S$GLB,, | Performed by: ORTHOPAEDIC SURGERY

## 2022-05-18 PROCEDURE — 99024 POSTOP FOLLOW-UP VISIT: CPT | Mod: S$GLB,,, | Performed by: ORTHOPAEDIC SURGERY

## 2022-05-18 PROCEDURE — 3008F BODY MASS INDEX DOCD: CPT | Mod: CPTII,S$GLB,, | Performed by: ORTHOPAEDIC SURGERY

## 2022-05-18 PROCEDURE — 1160F PR REVIEW ALL MEDS BY PRESCRIBER/CLIN PHARMACIST DOCUMENTED: ICD-10-PCS | Mod: CPTII,S$GLB,, | Performed by: ORTHOPAEDIC SURGERY

## 2022-05-18 PROCEDURE — 99024 PR POST-OP FOLLOW-UP VISIT: ICD-10-PCS | Mod: S$GLB,,, | Performed by: ORTHOPAEDIC SURGERY

## 2022-05-18 RX ORDER — HYDROCODONE BITARTRATE AND ACETAMINOPHEN 7.5; 325 MG/1; MG/1
1 TABLET ORAL EVERY 6 HOURS PRN
Qty: 28 TABLET | Refills: 0 | Status: SHIPPED | OUTPATIENT
Start: 2022-05-18 | End: 2022-05-24 | Stop reason: SDUPTHER

## 2022-05-18 NOTE — PROGRESS NOTES
Subjective:       Patient ID: Josh Coulter is a 51 y.o. male.    Chief Complaint: Post-op Evaluation of the Neck (Post-op Evaluation of the Neck (C5-6, C6-7 ACDF 02/24/2022, Still having pain that radiates down the arm, Right hand is weak, unable to . Needs refills on his pain medication)      History of Present Illness    Prior to meeting with the patient I reviewed the medical chart in [facility]. This included reviewing the previous progress notes from our office, review of the patient's last appointment with their primary care provider, review of any visits to the emergency room, and review of any pain management appointments or procedures.   Patient is here follow-up status post C5-6 and C6-7 ACDF.    Unfortunately he is still having some complications to include dysphagia and dysphonia.  When he was last he a he was referred to an otolaryngologist; however this physician is no longer available in this area. We also recommended outpatient follow-up with speech therapy but this was not done either.  Also the right sided neck pain is now again radiating down his right upper extremity causing him to lose his grasp on objects.  The patient notes that overall he is better than he was prior to surgery he still has some significant symptomatology.    Current Medications  Current Outpatient Medications   Medication Sig Dispense Refill    cetirizine (ZYRTEC) 10 MG tablet TAKE ONE TABLET (10MG) BY MOUTH EVERY EVENING 30 tablet 3    cyclobenzaprine (FLEXERIL) 10 MG tablet Take 1 tablet (10 mg total) by mouth 3 (three) times daily. 90 tablet 2    DULoxetine (CYMBALTA) 60 MG capsule TAKE ONE CAPSULE (60MG) BY MOUTH ONCE DAILY 30 capsule 5    HYDROcodone-acetaminophen (NORCO) 7.5-325 mg per tablet Take 1 tablet by mouth every 6 (six) hours as needed for Pain. 28 tablet 0    meloxicam (MOBIC) 15 MG tablet Take 1 tablet (15 mg total) by mouth once daily. 30 tablet 2    pregabalin (LYRICA) 50 MG capsule       tamsulosin  (FLOMAX) 0.4 mg Cap TAKE TWO CAPSULES (0.8 MG TOTAL) BY MOUTH ONCE DAILY 60 capsule 3    tiZANidine (ZANAFLEX) 4 MG tablet Take 1 tablet (4 mg total) by mouth every 6 (six) hours as needed (muscle spasm). 90 tablet 2     No current facility-administered medications for this visit.       Allergies  Review of patient's allergies indicates:   Allergen Reactions    Lyrica [pregabalin] Other (See Comments)     Patient complains of suicidal ideation with this medication    Contrast media Other (See Comments)     syncopy       Past Medical History  Past Medical History:   Diagnosis Date    Arthritis     Digestive disorder     Hyponatremia     Syncope     Wears glasses        Surgical History  Past Surgical History:   Procedure Laterality Date    ANTERIOR CERVICAL DISCECTOMY W/ FUSION N/A 2/24/2022    Procedure: DISCECTOMY, SPINE, CERVICAL, ANTERIOR APPROACH, WITH FUSION;  Surgeon: Cholo Mena MD;  Location: [facility];  Service: Orthopedics;  Laterality: N/A;  [employer], [employer]- Segun and Shamar notified on 2/23, [facility] attachment head piece, head halter chin strap, posterior cervical support, and christophe posterior cervical spine support    ANTERIOR LUMBAR INTERBODY FUSION (ALIF) N/A 3/31/2020    Procedure: FUSION, SPINE, LUMBAR, ALIF L4-5;  Surgeon: Cholo Mena MD;  Location: [facility] OR;  Service: Orthopedics;  Laterality: N/A;    BONE GRAFT N/A 3/31/2020    Procedure: BONE GRAFT;  Surgeon: Cholo Mena MD;  Location: [facility] OR;  Service: Orthopedics;  Laterality: N/A;    BONE GRAFT N/A 2/24/2022    Procedure: BONE GRAFT;  Surgeon: Cholo Mena MD;  Location: [facility] OR;  Service: Orthopedics;  Laterality: N/A;    FUSION OF SPINE WITH INSTRUMENTATION N/A 3/31/2020    Procedure: FUSION, SPINE, WITH INSTRUMENTATION L4-5;  Surgeon: Cholo Mena MD;  Location: [facility] OR;  Service: Orthopedics;  Laterality: N/A;  [employer]I, [employer], DR DAMON, CO-SURGEON    FUSION OF SPINE WITH INSTRUMENTATION N/A  2/24/2022    Procedure: FUSION, SPINE, WITH INSTRUMENTATION;  Surgeon: Cholo Mena MD;  Location: [facility] OR;  Service: Orthopedics;  Laterality: N/A;    HERNIA REPAIR      REMOVAL OF HARDWARE FROM SPINE N/A 2/11/2021    Procedure: REMOVAL, HARDWARE, SPINE L4-5;  Surgeon: Cholo Mena MD;  Location: [facility] OR;  Service: Orthopedics;  Laterality: N/A;  [employer]    SPINE SURGERY  2009       Family History:   Family History   Problem Relation Age of Onset    Hyperlipidemia Mother     Hypertension Mother     Hyperlipidemia Father     Hypertension Father        Social History:   Social History     Socioeconomic History    Marital status: Legally     Number of children: 3   Occupational History    Occupation: unemployed   Tobacco Use    Smoking status: Current Every Day Smoker     Packs/day: 1.00     Years: 19.00     Pack years: 19.00     Types: Cigarettes    Smokeless tobacco: Never Used   Substance and Sexual Activity    Alcohol use: Yes     Alcohol/week: 14.0 standard drinks     Types: 14 Cans of beer per week     Comment: 2 beers per night    Drug use: Yes     Types: Marijuana    Sexual activity: Yes     Partners: Female       Hospitalization/Major Diagnostic Procedure:     Review of Systems     General/Constitutional:  Chills denies. Fatigue denies. Fever denies. Weight gain denies. Weight loss denies.    Respiratory:  Shortness of breath denies.    Cardiovascular:  Chest pain denies.    Gastrointestinal:  Constipation denies. Diarrhea denies. Nausea denies. Vomiting denies.     Hematology:  Easy bruising denies. Prolonged bleeding denies.     Genitourinary:  Frequent urination denies. Pain in lower back denies. Painful urination denies.     Musculoskeletal:  See HPI for details    Skin:  Rash denies.    Neurologic:  Dizziness denies. Gait abnormalities denies. Seizures denies. Tingling/Numbess denies.    Psychiatric:  Anxiety denies. Depressed mood denies.     Objective:   Vital Signs:  There were no vitals filed for this visit.     Physical Exam      General Examination:     Constitutional: The patient is alert and oriented to lace person and time. Mood is pleasant.     Head/Face: Normal facial features normal eyebrows    Eyes: Normal extraocular motion bilaterally    Lungs: Respirations are equal and unlabored    Gait is coordinated.    Cardiovascular: There are no swelling or varicosities present.    Lymphatic: Negative for adenopathy    Skin: Normal    Neurological: Level of consciousness normal. Oriented to place person and time and situation    Psychiatric: Oriented to time place person and situation    Cervical exam demonstrates well-healed anterior incision.  Mild scar tissue.  He does have a forward head posture.  No collar today.  Cervical range of motion remains slightly diminished in all planes secondary to guarding.  Bilateral upper extremities are distal neurovascular intact with some mild give-way weakness secondary to guarding and pain. No focal neurologic weakness noted on exam today.    XRAY Report/ Interpretation:  No new studies today because our x-ray machine is down.  However we reviewed updated studies of the cervical spine when he was just here a month ago.  There is an osteophyte on the right at C6-7 causing some foraminal stenosis and nerve impingement.      Assessment:       1. S/P cervical spinal fusion    2. Other dysphagia    3. Dysphonia    4. Cervical radiculitis    5. Cervical stenosis of spine        Plan:       Josh was seen today for post-op evaluation.    Diagnoses and all orders for this visit:    S/P cervical spinal fusion  -     Ambulatory referral/consult to ENT; Future  -     Ambulatory referral/consult to Pain Clinic; Future    Other dysphagia  -     Ambulatory referral/consult to ENT; Future    Dysphonia  -     Ambulatory referral/consult to ENT; Future    Cervical radiculitis  -     Ambulatory referral/consult to Pain Clinic; Future  -     Cancel:  Ambulatory referral/consult to Neurology; Future  -     Ambulatory referral/consult to Neurology; Future    Cervical stenosis of spine  -     Ambulatory referral/consult to Pain Clinic; Future         Follow up for after EMG/NCV B/L UE (Dr Clemente Morales), after Rt C7 n.r. TFESI (Dr Parker).  This is to attest that the physician's assistant Pete been out of served in the capacity as a scribe for this patient encounter.  This is also to verify that I have reviewed the patient's history and helped formulate the treatment plan and discussed it with the physician's assistant .  I have actively participated and evaluation and treatment plan for this patient visit.  The treatment plan and medical decision-making is outlined below:  He definitely still needs to see the otolaryngologist for evaluation.  Because of the upper extremity symptoms also recommend a bilateral upper extremity EMG and nerve conduction study and because of the MRI findings of some foraminal stenosis with nerve impingement I recommend a right C7 nerve root transforaminal ANDREW we will see him back in about 4 weeks to review everything and go from there.    Treatment options were discussed with regards to the nature of the medical condition. Conservative pain intervention and surgical options were discussed in detail. The probability of success of each separate treatment option was discussed. The patient expressed a clear understanding of the treatment options. With regards to surgery, the procedure risk, benefits, complications, and outcomes were discussed. No guarantees were given with regards to surgical outcome.   The risk of complications, morbidity, and mortality of patient management decisions have been made at the time of this visit. These are associated with the patient's problems, diagnostic procedures and treatment options. This includes the possible management options selected and those considered but not selected by the patient after shared  medical decision making we discussed with the patient.     This note was created using Dragon voice recognition software that occasionally misinterpreted phrases or words.

## 2022-05-24 DIAGNOSIS — M50.30 DDD (DEGENERATIVE DISC DISEASE), CERVICAL: ICD-10-CM

## 2022-05-24 RX ORDER — HYDROCODONE BITARTRATE AND ACETAMINOPHEN 7.5; 325 MG/1; MG/1
1 TABLET ORAL EVERY 6 HOURS PRN
Qty: 28 TABLET | Refills: 0 | Status: SHIPPED | OUTPATIENT
Start: 2022-05-24 | End: 2022-05-31

## 2022-05-31 DIAGNOSIS — Z98.1 S/P CERVICAL SPINAL FUSION: Primary | ICD-10-CM

## 2022-05-31 RX ORDER — HYDROCODONE BITARTRATE AND ACETAMINOPHEN 5; 325 MG/1; MG/1
1 TABLET ORAL EVERY 6 HOURS PRN
Qty: 28 TABLET | Refills: 0 | Status: SHIPPED | OUTPATIENT
Start: 2022-05-31 | End: 2022-06-07 | Stop reason: SDUPTHER

## 2022-05-31 NOTE — TELEPHONE ENCOUNTER
Electronically prescribed a prescription for  Norco 5mg with instructions to take  1 tablet by mouth every  6 hours as needed for pain.  I prescribed quantity  28.  Patient is approximately  13 weeks status post  spinal fusion.    In keeping with clinic policy, we are tapering from  some Norco 7.5mg to  Norco 5mg.    ----- Message from Zaria Gonsalez sent at 5/31/2022  4:01 PM CDT -----  Phone #: [phone]  Patient is requesting a refill of [place] 7.5    Pharmacy:   [facility] [place]  [address]  [address]  [place] [address]  Phone: [phone] Fax: [phone]

## 2022-06-07 ENCOUNTER — TELEPHONE (OUTPATIENT)
Dept: ORTHOPEDICS | Facility: CLINIC | Age: 52
End: 2022-06-07

## 2022-06-07 DIAGNOSIS — Z98.1 S/P CERVICAL SPINAL FUSION: ICD-10-CM

## 2022-06-07 RX ORDER — HYDROCODONE BITARTRATE AND ACETAMINOPHEN 5; 325 MG/1; MG/1
1 TABLET ORAL EVERY 8 HOURS PRN
Qty: 21 TABLET | Refills: 0 | Status: SHIPPED | OUTPATIENT
Start: 2022-06-07 | End: 2022-06-14 | Stop reason: SDUPTHER

## 2022-06-07 NOTE — TELEPHONE ENCOUNTER
----- Message from Cherelle Davies sent at 6/3/2022 10:50 AM CDT -----  Pt  said  please  send  referral again to  & Dr. MINE Romero. Pt  stated  they  haven't  received  them  yet.

## 2022-06-07 NOTE — TELEPHONE ENCOUNTER
Electronically prescribed a prescription for hydrocodone 5mg with instructions to take 1 tablet by mouth every 8 hours as needed for pain.  I prescribed quantity 21.  Patient is approximately 14 weeks status post cervical fusion.    In keeping with clinic policy, we are tapering from q.i.d. to t.i.d. dosing    Should transition patient to tramadol at his next refill.  With goal of discontinuing narcotic medications.    ----- Message from Radha Mario sent at 6/7/2022 12:52 PM CDT -----  Regarding: RX Refill  Phone #: [phone]  Patient is requesting a refill of pain medication  Pharmacy:   [facility] [place]  [address]  [address]  [place] [address]  Phone: [phone] Fax: [phone]

## 2022-06-14 ENCOUNTER — TELEPHONE (OUTPATIENT)
Dept: ORTHOPEDICS | Facility: CLINIC | Age: 52
End: 2022-06-14

## 2022-06-14 ENCOUNTER — TELEPHONE (OUTPATIENT)
Dept: ORTHOPEDICS | Facility: HOSPITAL | Age: 52
End: 2022-06-14
Payer: MEDICAID

## 2022-06-14 DIAGNOSIS — Z98.1 S/P CERVICAL SPINAL FUSION: ICD-10-CM

## 2022-06-14 RX ORDER — HYDROCODONE BITARTRATE AND ACETAMINOPHEN 5; 325 MG/1; MG/1
1 TABLET ORAL EVERY 12 HOURS PRN
Qty: 14 TABLET | Refills: 0 | Status: SHIPPED | OUTPATIENT
Start: 2022-06-14 | End: 2022-06-22 | Stop reason: SDUPTHER

## 2022-06-14 NOTE — TELEPHONE ENCOUNTER
----- Message from Kacy Ruiz LPN sent at 6/14/2022  2:35 PM CDT -----  Regarding: FW: RX Refill  Do you want to refill medication?    ----- Message -----  From: JP Malave  Sent: 6/14/2022   2:32 PM CDT  To: Kacy Ruiz LPN  Subject: FW: RX Refill                                      ----- Message -----  From: Gaby Preston  Sent: 6/14/2022   1:11 PM CDT  To: JP Malave  Subject: RX Refill                                        Phone #: [phone]  Patient is requesting a refill of NORCO 5 - 325 mg   [facility] [place] [facility] [address]  [address]  [place] [address]  Phone: [phone] Fax: [phone]

## 2022-06-14 NOTE — TELEPHONE ENCOUNTER
Patient is approximately 16 weeks status post cervical fusion.  I will refer this back to Mr. Guzmán, physician assistant if they wish to continue his pain management.    ----- Message from Gaby Preston sent at 6/14/2022  1:09 PM CDT -----  Regarding: RX Refill  Phone #: [phone]  Patient is requesting a refill of NORCO 5 - 325 mg   Pharmacy:   [facility] [place]  [address]  [address] [address]  [place] [address]  Phone: [phone] Fax: [phone]

## 2022-06-22 ENCOUNTER — TELEPHONE (OUTPATIENT)
Dept: ORTHOPEDICS | Facility: HOSPITAL | Age: 52
End: 2022-06-22
Payer: MEDICAID

## 2022-06-22 DIAGNOSIS — Z98.1 S/P CERVICAL SPINAL FUSION: ICD-10-CM

## 2022-06-22 RX ORDER — HYDROCODONE BITARTRATE AND ACETAMINOPHEN 5; 325 MG/1; MG/1
1 TABLET ORAL EVERY 12 HOURS PRN
Qty: 14 TABLET | Refills: 0 | Status: SHIPPED | OUTPATIENT
Start: 2022-06-22 | End: 2022-06-29 | Stop reason: SDUPTHER

## 2022-06-22 NOTE — TELEPHONE ENCOUNTER
----- Message from Kezia Giang sent at 6/21/2022  3:32 PM CDT -----  Phone #: [phone]  Patient is requesting a refill of Hydrocodone        Pharmacy:   [facility] [place] - [address]  [address]  [place] [address]  Phone: [phone] Fax: [phone]

## 2022-06-29 ENCOUNTER — TELEPHONE (OUTPATIENT)
Dept: ORTHOPEDICS | Facility: CLINIC | Age: 52
End: 2022-06-29

## 2022-06-29 DIAGNOSIS — Z98.1 S/P CERVICAL SPINAL FUSION: ICD-10-CM

## 2022-06-29 RX ORDER — HYDROCODONE BITARTRATE AND ACETAMINOPHEN 5; 325 MG/1; MG/1
1 TABLET ORAL EVERY 12 HOURS PRN
Qty: 14 TABLET | Refills: 0 | Status: SHIPPED | OUTPATIENT
Start: 2022-06-29 | End: 2022-07-06 | Stop reason: SDUPTHER

## 2022-06-29 NOTE — TELEPHONE ENCOUNTER
----- Message from Cherelle Davies sent at 6/28/2022 10:34 AM CDT -----  Phone #: [phone]  Patient is requesting a refill of Norco 5-325mg                  [facility]   [facility] [place]  [address]  [address]  [place] [address]  Phone: [phone] Fax: [phone]

## 2022-07-02 NOTE — ED NOTES
Assumed care of pt. Pt alert and oriented. Pt unable to remember what happened to him at the truck stop. Pt blames it on having a pinched nerve in his back that causes his legs to give way sometimes. He thinks maybe this happened and he hit his head. Family at bedside. Pt in no noted distress at this time.   
Pt is currently in xray  
10-Nov-2020 07:20
02-Jul-2022 02:44:39

## 2022-07-06 ENCOUNTER — TELEPHONE (OUTPATIENT)
Dept: ORTHOPEDICS | Facility: CLINIC | Age: 52
End: 2022-07-06

## 2022-07-06 DIAGNOSIS — Z98.1 S/P CERVICAL SPINAL FUSION: ICD-10-CM

## 2022-07-06 RX ORDER — HYDROCODONE BITARTRATE AND ACETAMINOPHEN 5; 325 MG/1; MG/1
1 TABLET ORAL EVERY 12 HOURS PRN
Qty: 14 TABLET | Refills: 0 | Status: SHIPPED | OUTPATIENT
Start: 2022-07-06 | End: 2022-07-12 | Stop reason: SDUPTHER

## 2022-07-06 NOTE — TELEPHONE ENCOUNTER
----- Message from Gaby Preston sent at 7/5/2022  3:18 PM CDT -----  Regarding: RX Refill  Phone #: [phone]  Patient is requesting a refill of NORCO 7.5 - 325 mg   Pharmacy:   [facility] [place]  [address]  [address]  [place] [address]  Phone: [phone] Fax: [phone]

## 2022-07-06 NOTE — TELEPHONE ENCOUNTER
Spoke with the patient. No one has called him from pain management. I don't think the fax number it was sent to is correct .... [facility]. Confirmed fax number as well.

## 2022-07-06 NOTE — TELEPHONE ENCOUNTER
----- Message from JP Monteiro sent at 7/6/2022  8:11 AM CDT -----  Regarding: FW: RX Refill  Can we call Josh and see what's going on?    ----- Message -----  From: Gaby Preston  Sent: 7/5/2022   3:19 PM CDT  To: JP Monteiro  Subject: RX Refill                                        Phone #: [phone]  Patient is requesting a refill of NORCO 7.5 - 325 mg   Pharmacy:   [facility] [place] - [address]  [address]  [place] [address]  Phone: [phone] Fax: [phone]

## 2022-07-12 ENCOUNTER — TELEPHONE (OUTPATIENT)
Dept: ORTHOPEDICS | Facility: CLINIC | Age: 52
End: 2022-07-12

## 2022-07-12 DIAGNOSIS — Z98.1 S/P CERVICAL SPINAL FUSION: ICD-10-CM

## 2022-07-12 RX ORDER — HYDROCODONE BITARTRATE AND ACETAMINOPHEN 5; 325 MG/1; MG/1
1 TABLET ORAL EVERY 12 HOURS PRN
Qty: 14 TABLET | Refills: 0 | Status: SHIPPED | OUTPATIENT
Start: 2022-07-12 | End: 2022-07-21

## 2022-07-12 NOTE — TELEPHONE ENCOUNTER
Electronically prescribed a prescription for hydrocodone 5mg with instructions to take 1 tablet by mouth every 12 hours as needed for pain.  I prescribed quantity 14.  Patient is approximately 18 weeks status post cervical fusion.    ----- Message from Radha aMrio sent at 7/12/2022 12:31 PM CDT -----  Regarding: Rx Request  Patient is not scheduled for his injections with pain management until 8/3.    Phone #: [phone]  Patient is requesting a refill of pain medication  Pharmacy:   [facility] [place]  [address]  [address]  [place] [address]  Phone: [phone] Fax: [phone]

## 2022-07-27 ENCOUNTER — TELEPHONE (OUTPATIENT)
Dept: ORTHOPEDICS | Facility: HOSPITAL | Age: 52
End: 2022-07-27
Payer: MEDICAID

## 2022-07-27 DIAGNOSIS — Z98.1 S/P CERVICAL SPINAL FUSION: ICD-10-CM

## 2022-07-27 RX ORDER — HYDROCODONE BITARTRATE AND ACETAMINOPHEN 5; 325 MG/1; MG/1
TABLET ORAL
Qty: 14 TABLET | Refills: 0 | Status: SHIPPED | OUTPATIENT
Start: 2022-07-27

## 2022-07-27 NOTE — TELEPHONE ENCOUNTER
----- Message from Kezia Giang sent at 7/27/2022 12:16 PM CDT -----  Phone #: [phone]  Patient is requesting a refill of Hydrocodone          Pharmacy:   [facility] - [place] - [address]  [address]  [address]  [place] [address]  Phone: [phone] Fax: [phone]

## 2022-08-01 ENCOUNTER — OFFICE VISIT (OUTPATIENT)
Dept: ORTHOPEDICS | Facility: CLINIC | Age: 52
End: 2022-08-01
Payer: MEDICARE

## 2022-08-01 VITALS — BODY MASS INDEX: 20.09 KG/M2 | HEIGHT: 76 IN | WEIGHT: 165 LBS

## 2022-08-01 DIAGNOSIS — R49.0 DYSPHONIA: ICD-10-CM

## 2022-08-01 DIAGNOSIS — M47.812 ARTHROPATHY OF CERVICAL FACET JOINT: ICD-10-CM

## 2022-08-01 DIAGNOSIS — R13.10 DYSPHAGIA, UNSPECIFIED TYPE: ICD-10-CM

## 2022-08-01 DIAGNOSIS — G56.01 CARPAL TUNNEL SYNDROME ON RIGHT: ICD-10-CM

## 2022-08-01 DIAGNOSIS — Z98.1 HISTORY OF FUSION OF CERVICAL SPINE: ICD-10-CM

## 2022-08-01 DIAGNOSIS — G56.21 CUBITAL TUNNEL SYNDROME ON RIGHT: ICD-10-CM

## 2022-08-01 DIAGNOSIS — M54.12 CERVICAL RADICULITIS: Primary | ICD-10-CM

## 2022-08-01 PROCEDURE — 99213 PR OFFICE/OUTPT VISIT, EST, LEVL III, 20-29 MIN: ICD-10-PCS | Mod: S$GLB,,, | Performed by: ORTHOPAEDIC SURGERY

## 2022-08-01 PROCEDURE — 1160F RVW MEDS BY RX/DR IN RCRD: CPT | Mod: CPTII,S$GLB,, | Performed by: ORTHOPAEDIC SURGERY

## 2022-08-01 PROCEDURE — 1159F PR MEDICATION LIST DOCUMENTED IN MEDICAL RECORD: ICD-10-PCS | Mod: CPTII,S$GLB,, | Performed by: ORTHOPAEDIC SURGERY

## 2022-08-01 PROCEDURE — 99213 OFFICE O/P EST LOW 20 MIN: CPT | Mod: S$GLB,,, | Performed by: ORTHOPAEDIC SURGERY

## 2022-08-01 PROCEDURE — 3008F PR BODY MASS INDEX (BMI) DOCUMENTED: ICD-10-PCS | Mod: CPTII,S$GLB,, | Performed by: ORTHOPAEDIC SURGERY

## 2022-08-01 PROCEDURE — 1160F PR REVIEW ALL MEDS BY PRESCRIBER/CLIN PHARMACIST DOCUMENTED: ICD-10-PCS | Mod: CPTII,S$GLB,, | Performed by: ORTHOPAEDIC SURGERY

## 2022-08-01 PROCEDURE — 1159F MED LIST DOCD IN RCRD: CPT | Mod: CPTII,S$GLB,, | Performed by: ORTHOPAEDIC SURGERY

## 2022-08-01 PROCEDURE — 3008F BODY MASS INDEX DOCD: CPT | Mod: CPTII,S$GLB,, | Performed by: ORTHOPAEDIC SURGERY

## 2022-08-01 RX ORDER — HYDROCODONE BITARTRATE AND ACETAMINOPHEN 10; 325 MG/1; MG/1
1 TABLET ORAL EVERY 6 HOURS PRN
Qty: 28 TABLET | Refills: 0 | Status: SHIPPED | OUTPATIENT
Start: 2022-08-01 | End: 2022-08-08

## 2022-08-01 NOTE — PROGRESS NOTES
Subjective:       Patient ID: Josh Coulter is a 51 y.o. male.    Chief Complaint: Pain of the Neck (Cervical pain follow up. Here for EMG results)      History of Present Illness    Prior to meeting with the patient I reviewed the medical chart in [facility]. This included reviewing the previous progress notes from our office, review of the patient's last appointment with their primary care provider, review of any visits to the emergency room, and review of any pain management appointments or procedures.   Status post anterior cervical fusion with some persistent posterior neck pain and dysphagia and dysphonia.  He is scheduled to see Dr. Ashley a pain management doctor for we had recommended in the past being some cervical medial branch blocks but for insurance reasons this was delayed also he saw an otolaryngologist who recommended a swallowing study at [facility] however this was not performed.  Lastly he claims that the hydrocodone 5 mg not strong enough to help his residual pain.    Current Medications  Current Outpatient Medications   Medication Sig Dispense Refill    cetirizine (ZYRTEC) 10 MG tablet TAKE ONE TABLET (10MG) BY MOUTH EVERY EVENING 30 tablet 3    cyclobenzaprine (FLEXERIL) 10 MG tablet Take 1 tablet (10 mg total) by mouth 3 (three) times daily. 90 tablet 2    HYDROcodone-acetaminophen (NORCO) 5-325 mg per tablet TAKE 1 TABLET BY MOUTH EVERY 12 HOURS AS NEEDED FOR PAIN 14 tablet 0    meloxicam (MOBIC) 15 MG tablet TAKE 1 TABLET (15MG) BY MOUTH ONCE DAILY 30 tablet 2    tamsulosin (FLOMAX) 0.4 mg Cap TAKE TWO CAPSULES (0.8 MG TOTAL) BY MOUTH ONCE DAILY 60 capsule 3    DULoxetine (CYMBALTA) 60 MG capsule TAKE ONE CAPSULE (60MG) BY MOUTH ONCE DAILY (Patient not taking: Reported on 8/1/2022) 30 capsule 5    HYDROcodone-acetaminophen (NORCO)  mg per tablet Take 1 tablet by mouth every 6 (six) hours as needed for Pain. 28 tablet 0    pregabalin (LYRICA) 50 MG capsule        No current  facility-administered medications for this visit.       Allergies  Review of patient's allergies indicates:   Allergen Reactions    Lyrica [pregabalin] Other (See Comments)     Patient complains of suicidal ideation with this medication    Contrast media Other (See Comments)     syncopy       Past Medical History  Past Medical History:   Diagnosis Date    Arthritis     Digestive disorder     Hyponatremia     Syncope     Wears glasses        Surgical History  Past Surgical History:   Procedure Laterality Date    ANTERIOR CERVICAL DISCECTOMY W/ FUSION N/A 2/24/2022    Procedure: DISCECTOMY, SPINE, CERVICAL, ANTERIOR APPROACH, WITH FUSION;  Surgeon: Cholo Mena MD;  Location: [facility] OR;  Service: Orthopedics;  Laterality: N/A;  [employer], [employer]- Segun and Shamar notified on 2/23, [facility] attachment head piece, head halter chin strap, posterior cervical support, and christophe posterior cervical spine support    ANTERIOR LUMBAR INTERBODY FUSION (ALIF) N/A 3/31/2020    Procedure: FUSION, SPINE, LUMBAR, ALIF L4-5;  Surgeon: Cholo Mena MD;  Location: [facility] OR;  Service: Orthopedics;  Laterality: N/A;    BONE GRAFT N/A 3/31/2020    Procedure: BONE GRAFT;  Surgeon: Cholo Mena MD;  Location: [facility] OR;  Service: Orthopedics;  Laterality: N/A;    BONE GRAFT N/A 2/24/2022    Procedure: BONE GRAFT;  Surgeon: Cholo Mena MD;  Location: [facility] OR;  Service: Orthopedics;  Laterality: N/A;    FUSION OF SPINE WITH INSTRUMENTATION N/A 3/31/2020    Procedure: FUSION, SPINE, WITH INSTRUMENTATION L4-5;  Surgeon: Cholo Mena MD;  Location: [facility] OR;  Service: Orthopedics;  Laterality: N/A;  [facility]I, [employer], DR DAMON, CO-SURGEON    FUSION OF SPINE WITH INSTRUMENTATION N/A 2/24/2022    Procedure: FUSION, SPINE, WITH INSTRUMENTATION;  Surgeon: Cholo Mena MD;  Location: [facility] OR;  Service: Orthopedics;  Laterality: N/A;    HERNIA REPAIR      REMOVAL OF HARDWARE FROM SPINE N/A 2/11/2021     Procedure: REMOVAL, HARDWARE, SPINE L4-5;  Surgeon: Cholo Mena MD;  Location: [facility];  Service: Orthopedics;  Laterality: N/A;  [employer]    SPINE SURGERY  2009       Family History:   Family History   Problem Relation Age of Onset    Hyperlipidemia Mother     Hypertension Mother     Hyperlipidemia Father     Hypertension Father        Social History:   Social History     Socioeconomic History    Marital status: Legally     Number of children: 3   Occupational History    Occupation: unemployed   Tobacco Use    Smoking status: Current Every Day Smoker     Packs/day: 1.00     Years: 19.00     Pack years: 19.00     Types: Cigarettes    Smokeless tobacco: Never Used   Substance and Sexual Activity    Alcohol use: Yes     Alcohol/week: 14.0 standard drinks     Types: 14 Cans of beer per week     Comment: 2 beers per night    Drug use: Yes     Types: Marijuana    Sexual activity: Yes     Partners: Female       Hospitalization/Major Diagnostic Procedure:     Review of Systems     General/Constitutional:  Chills denies. Fatigue denies. Fever denies. Weight gain denies. Weight loss denies.    Respiratory:  Shortness of breath denies.    Cardiovascular:  Chest pain denies.    Gastrointestinal:  Constipation denies. Diarrhea denies. Nausea denies. Vomiting denies.     Hematology:  Easy bruising denies. Prolonged bleeding denies.     Genitourinary:  Frequent urination denies. Pain in lower back denies. Painful urination denies.     Musculoskeletal:  See HPI for details    Skin:  Rash denies.    Neurologic:  Dizziness denies. Gait abnormalities denies. Seizures denies. Tingling/Numbess denies.    Psychiatric:  Anxiety denies. Depressed mood denies.     Objective:   Vital Signs: There were no vitals filed for this visit.     Physical Exam      General Examination:     Constitutional: The patient is alert and oriented to lace person and time. Mood is pleasant.     Head/Face: Normal facial features  normal eyebrows    Eyes: Normal extraocular motion bilaterally    Lungs: Respirations are equal and unlabored    Gait is coordinated.    Cardiovascular: There are no swelling or varicosities present.    Lymphatic: Negative for adenopathy    Skin: Normal    Neurological: Level of consciousness normal. Oriented to place person and time and situation    Psychiatric: Oriented to time place person and situation    Cervical incision well healed nontender moderate tenderness posterior paraspinous muscles bilaterally left greater than right without spasm Spurling's maneuver causes neck pain  XRAY Report/ Interpretation:  No recent x-rays today prior x-rays have shown some progressive consolidation of the interbody fusions however they are not complete particularly at the C5-6 level.  There has been some slight subsidence of the interbody device at C5-6 however there is early consolidation within the cage    EMG nerve conduction study consistent with right carpal tunnel syndrome, ulnar neuropathy right elbow, bilateral C6-7 radiculopathy  Assessment:       1. Cervical radiculitis    2. History of fusion of cervical spine    3. Cubital tunnel syndrome on right    4. Carpal tunnel syndrome on right    5. Dysphonia    6. Dysphagia, unspecified type    7. Constant low-grade dysphagia    8. Other disorders of nervous system    9. Other postprocedural complications and disorders of nervous system    10. Arthropathy of cervical facet joint        Plan:       Josh was seen today for pain.    Diagnoses and all orders for this visit:    Cervical radiculitis  -     Ambulatory referral/consult to Pain Clinic; Future    History of fusion of cervical spine  -     Ambulatory referral/consult to Pain Clinic; Future    Cubital tunnel syndrome on right    Carpal tunnel syndrome on right    Dysphonia    Dysphagia, unspecified type    Constant low-grade dysphagia    Other disorders of nervous system    Other postprocedural complications and  disorders of nervous system    Arthropathy of cervical facet joint  -     Ambulatory referral/consult to Pain Clinic; Future    Other orders  -     HYDROcodone-acetaminophen (NORCO)  mg per tablet; Take 1 tablet by mouth every 6 (six) hours as needed for Pain.         No follow-ups on file.    Several recommendations were made regards to ulnar neuropathy and carpal tunnel on the right demonstrated on EMG we advised observation at this time since he is not in favor proceeding with any surgery and I am agreement agree with that recommendation    I recommend that he see Dr. Ashley for medial branch blocks of the cervical spine and we will request Dr. Doug ugalde medication pain management since it is beyond the global period for surgery    We have attempted to schedule his barium swallow in study but were unable to do so therefore we will send a message to Dr. Nicholas the the otolaryngologist and asked them to try to get this study scheduled at a different facility since it is not available at [facility]  Treatment options were discussed with regards to the nature of the medical condition. Conservative pain intervention and surgical options were discussed in detail. The probability of success of each separate treatment option was discussed. The patient expressed a clear understanding of the treatment options. With regards to surgery, the procedure risk, benefits, complications, and outcomes were discussed. No guarantees were given with regards to surgical outcome.   The risk of complications, morbidity, and mortality of patient management decisions have been made at the time of this visit. These are associated with the patient's problems, diagnostic procedures and treatment options. This includes the possible management options selected and those considered but not selected by the patient after shared medical decision making we discussed with the patient.     This note was created using Dragon voice  recognition software that occasionally misinterpreted phrases or words.

## 2022-08-16 ENCOUNTER — TELEPHONE (OUTPATIENT)
Dept: ORTHOPEDICS | Facility: CLINIC | Age: 52
End: 2022-08-16

## 2022-08-16 NOTE — TELEPHONE ENCOUNTER
----- Message from Radha Mario sent at 8/16/2022  2:15 PM CDT -----  Regarding: Letter  He is requesting a letter be faxed to Dr Parker's office stating we released him as our patient and the documentation of what it was for and the medication we have been giving him and the okay to treat him further. Can we fax it to [phone] and call patient once sent, he has an appointment tomorrow.

## 2023-03-02 ENCOUNTER — HOSPITAL ENCOUNTER (EMERGENCY)
Facility: HOSPITAL | Age: 53
Discharge: HOME | End: 2023-03-02
Attending: EMERGENCY MEDICINE
Payer: COMMERCIAL

## 2023-03-02 VITALS
HEART RATE: 70 BPM | WEIGHT: 170 LBS | BODY MASS INDEX: 20.7 KG/M2 | HEIGHT: 76 IN | TEMPERATURE: 97 F | RESPIRATION RATE: 18 BRPM | DIASTOLIC BLOOD PRESSURE: 78 MMHG | SYSTOLIC BLOOD PRESSURE: 132 MMHG | OXYGEN SATURATION: 97 %

## 2023-03-02 DIAGNOSIS — S99.929A FOOT INJURY: ICD-10-CM

## 2023-03-02 DIAGNOSIS — S99.919A ANKLE INJURY: ICD-10-CM

## 2023-03-02 DIAGNOSIS — S90.32XA CONTUSION OF LEFT FOOT, INITIAL ENCOUNTER: Primary | ICD-10-CM
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
## 2023-03-02 PROCEDURE — 99284 EMERGENCY DEPT VISIT MOD MDM: CPT

## 2023-03-02 PROCEDURE — 96372 THER/PROPH/DIAG INJ SC/IM: CPT | Performed by: EMERGENCY MEDICINE

## 2023-03-02 PROCEDURE — 63600175 PHARM REV CODE 636 W HCPCS: Performed by: EMERGENCY MEDICINE

## 2023-03-02 RX ORDER — KETOROLAC TROMETHAMINE 30 MG/ML
15 INJECTION, SOLUTION INTRAMUSCULAR; INTRAVENOUS
Status: COMPLETED | OUTPATIENT
Start: 2023-03-02 | End: 2023-03-02

## 2023-03-02 RX ADMIN — KETOROLAC TROMETHAMINE 15 MG: 30 INJECTION, SOLUTION INTRAMUSCULAR; INTRAVENOUS at 08:03

## 2023-03-02 NOTE — FIRST PROVIDER EVALUATION
Emergency Department TeleTriage Encounter Note      CHIEF COMPLAINT    Chief Complaint   Patient presents with    Fall     Pt fell yesterday c/o neck pain, back pain and L foot pain and swelling. Pt denies LOC, pt not on blood thinners            VITAL SIGNS   Initial Vitals [03/02/23 1648]   BP Pulse Resp Temp SpO2   129/63 107 18 98.3 °F (36.8 °C) 98 %      MAP       --            ALLERGIES    Review of patient's allergies indicates:   Allergen Reactions    Lyrica [pregabalin] Other (See Comments)     Patient complains of suicidal ideation with this medication    Contrast media Other (See Comments)     syncopy       PROVIDER TRIAGE NOTE  This is a teletriage evaluation of a 52 y.o. male presenting to the ED with c/o pain following recent injures. Large piece of wood dropped on his left foot, causing him to fall backwards. Pain in the left foot with swelling, neck, and generalized back. Reports recent neck and back surgery . Limited physical exam via telehealth: The patient is awake, alert, answering questions appropriately and is not in respiratory distress. Initial orders will be placed and care will be transferred to an alternate provider when patient is roomed for a full evaluation. Any additional orders and the final disposition will be determined by that provider.         ORDERS  Labs Reviewed   HIV 1 / 2 ANTIBODY   HEPATITIS C ANTIBODY       ED Orders (720h ago, onward)      Start Ordered     Status Ordering Provider    03/02/23 1732 03/02/23 1731  X-Ray Foot Complete Left  1 time imaging         Ordered VICTOR HUGO GARCIA    03/02/23 1732 03/02/23 1731  X-Ray Cervical Spine AP And Lateral  1 time imaging         Ordered VICTOR HUGO GARCIA    03/02/23 1732 03/02/23 1731  X-Ray Lumbar Spine Ap And Lateral  1 time imaging         Ordered VICTOR HUGO GARCIA    03/02/23 1732 03/02/23 1731  X-Ray Thoracic Spine AP Lateral  1 time imaging         Ordered VICTOR HUGO GARCIA    03/02/23 1650 03/02/23 1649  HIV 1/2 Ag/Ab  (4th Gen)  STAT         Pending Collection GAY ANNA.    03/02/23 1650 03/02/23 1649  Hepatitis C Antibody  STAT         Pending Collection GAY ANNA.              Virtual Visit Note: The provider triage portion of this emergency department evaluation and documentation was performed via [employer], a HIPAA-compliant telemedicine application, in concert with a tele-presenter in the room. A face to face patient evaluation with one of my colleagues will occur once the patient is placed in an emergency department room.      DISCLAIMER: This note was prepared with "[employer] voice recognition transcription software. Garbled syntax, mangled pronouns, and other bizarre constructions may be attributed to that software system.

## 2023-08-07 ENCOUNTER — OFFICE VISIT (OUTPATIENT)
Dept: ORTHOPEDICS | Facility: CLINIC | Age: 53
End: 2023-08-07
Payer: COMMERCIAL

## 2023-08-07 VITALS — WEIGHT: 170 LBS | HEIGHT: 76 IN | BODY MASS INDEX: 20.7 KG/M2

## 2023-08-07 DIAGNOSIS — R29.898 BILATERAL LEG WEAKNESS: Primary | ICD-10-CM

## 2023-08-07 DIAGNOSIS — Z98.1 HISTORY OF LUMBAR SPINAL FUSION: ICD-10-CM

## 2023-08-07 DIAGNOSIS — M54.12 CERVICAL RADICULITIS: ICD-10-CM

## 2023-08-07 DIAGNOSIS — Z98.1 HISTORY OF FUSION OF CERVICAL SPINE: ICD-10-CM

## 2023-08-07 DIAGNOSIS — M54.16 LUMBAR RADICULOPATHY: ICD-10-CM

## 2023-08-07 DIAGNOSIS — M51.36 DISC DEGENERATION, LUMBAR: ICD-10-CM

## 2023-08-07 PROCEDURE — 3008F PR BODY MASS INDEX (BMI) DOCUMENTED: ICD-10-PCS | Mod: CPTII,S$GLB,, | Performed by: ORTHOPAEDIC SURGERY

## 2023-08-07 PROCEDURE — 1160F PR REVIEW ALL MEDS BY PRESCRIBER/CLIN PHARMACIST DOCUMENTED: ICD-10-PCS | Mod: CPTII,S$GLB,, | Performed by: ORTHOPAEDIC SURGERY

## 2023-08-07 PROCEDURE — 1159F MED LIST DOCD IN RCRD: CPT | Mod: CPTII,S$GLB,, | Performed by: ORTHOPAEDIC SURGERY

## 2023-08-07 PROCEDURE — 99213 OFFICE O/P EST LOW 20 MIN: CPT | Mod: S$GLB,,, | Performed by: ORTHOPAEDIC SURGERY

## 2023-08-07 PROCEDURE — 1159F PR MEDICATION LIST DOCUMENTED IN MEDICAL RECORD: ICD-10-PCS | Mod: CPTII,S$GLB,, | Performed by: ORTHOPAEDIC SURGERY

## 2023-08-07 PROCEDURE — 1160F RVW MEDS BY RX/DR IN RCRD: CPT | Mod: CPTII,S$GLB,, | Performed by: ORTHOPAEDIC SURGERY

## 2023-08-07 PROCEDURE — 3008F BODY MASS INDEX DOCD: CPT | Mod: CPTII,S$GLB,, | Performed by: ORTHOPAEDIC SURGERY

## 2023-08-07 PROCEDURE — 99213 PR OFFICE/OUTPT VISIT, EST, LEVL III, 20-29 MIN: ICD-10-PCS | Mod: S$GLB,,, | Performed by: ORTHOPAEDIC SURGERY

## 2023-08-07 NOTE — PROGRESS NOTES
Subjective:       Patient ID: Josh Coulter is a 52 y.o. male.    Chief Complaint: Pain of the Lumbar Spine (Lumbar pain follow up. Here today due to the increasing number of falls due to his legs giving out. States that he is experiencing, stabbing pain, numbness and tingling in his legs.) and Pain of the Neck (Cervical pain that has been increasing due to several falls he has experienced lately due to his legs giving out.States that his neck pain is getting worse)      History of Present Illness    Prior to meeting with the patient I reviewed the medical chart in [facility]. This included reviewing the previous progress notes from our office, review of the patient's last appointment with their primary care provider, review of any visits to the emergency room, and review of any pain management appointments or procedures.   Status post previous lumbar surgery x2 that was being L5-S1 fusion and then adjacent segment L4-5 done 3 years ago also previous anterior cervical fusion with some postoperative dysphagia patient claims that he has been falling over the past several months.  No bowel or bladder incontinence.  Does give a history of sustaining injuries to his legs while in the  and had chronic weakness in the right leg.    Current Medications  Current Outpatient Medications   Medication Sig Dispense Refill    cetirizine (ZYRTEC) 10 MG tablet TAKE ONE TABLET (10MG) BY MOUTH EVERY EVENING 30 tablet 3    cyclobenzaprine (FLEXERIL) 10 MG tablet Take 1 tablet (10 mg total) by mouth 3 (three) times daily. 90 tablet 2    meloxicam (MOBIC) 15 MG tablet TAKE 1 TABLET (15MG) BY MOUTH ONCE DAILY 30 tablet 2    pregabalin (LYRICA) 50 MG capsule       tamsulosin (FLOMAX) 0.4 mg Cap TAKE TWO CAPSULES (0.8 MG TOTAL) BY MOUTH ONCE DAILY 60 capsule 3    HYDROcodone-acetaminophen (NORCO) 5-325 mg per tablet TAKE 1 TABLET BY MOUTH EVERY 12 HOURS AS NEEDED FOR PAIN (Patient not taking: Reported on 8/7/2023) 14 tablet 0     No  current facility-administered medications for this visit.       Allergies  Review of patient's allergies indicates:   Allergen Reactions    Lyrica [pregabalin] Other (See Comments)     Patient complains of suicidal ideation with this medication    Contrast media Other (See Comments)     syncopy       Past Medical History  Past Medical History:   Diagnosis Date    Arthritis     Digestive disorder     Hyponatremia     Syncope     Wears glasses        Surgical History  Past Surgical History:   Procedure Laterality Date    ANTERIOR CERVICAL DISCECTOMY W/ FUSION N/A 2/24/2022    Procedure: DISCECTOMY, SPINE, CERVICAL, ANTERIOR APPROACH, WITH FUSION;  Surgeon: Cholo Mena MD;  Location: [facility] OR;  Service: Orthopedics;  Laterality: N/A;  [employer], [employer]- Segun and Shamar notified on 2/23, [facility] attachment head piece, head halter chin strap, posterior cervical support, and christophe posterior cervical spine support    ANTERIOR LUMBAR INTERBODY FUSION (ALIF) N/A 3/31/2020    Procedure: FUSION, SPINE, LUMBAR, ALIF L4-5;  Surgeon: Cholo Mena MD;  Location: [facility] OR;  Service: Orthopedics;  Laterality: N/A;    BONE GRAFT N/A 3/31/2020    Procedure: BONE GRAFT;  Surgeon: Cholo Mena MD;  Location: [facility] OR;  Service: Orthopedics;  Laterality: N/A;    BONE GRAFT N/A 2/24/2022    Procedure: BONE GRAFT;  Surgeon: Cholo Mena MD;  Location: [facility] OR;  Service: Orthopedics;  Laterality: N/A;    FUSION OF SPINE WITH INSTRUMENTATION N/A 3/31/2020    Procedure: FUSION, SPINE, WITH INSTRUMENTATION L4-5;  Surgeon: Cholo Mena MD;  Location: [facility] OR;  Service: Orthopedics;  Laterality: N/A;  [employer], [employer], DR DAMON, CO-SURGEON    FUSION OF SPINE WITH INSTRUMENTATION N/A 2/24/2022    Procedure: FUSION, SPINE, WITH INSTRUMENTATION;  Surgeon: Cholo Mena MD;  Location: [facility] OR;  Service: Orthopedics;  Laterality: N/A;    HERNIA REPAIR      REMOVAL OF HARDWARE FROM SPINE N/A 2/11/2021    Procedure:  REMOVAL, HARDWARE, SPINE L4-5;  Surgeon: Cholo Mena MD;  Location: [facility];  Service: Orthopedics;  Laterality: N/A;  [employer]    SPINE SURGERY  2009       Family History:   Family History   Problem Relation Age of Onset    Hyperlipidemia Mother     Hypertension Mother     Hyperlipidemia Father     Hypertension Father        Social History:   Social History     Socioeconomic History    Marital status: Legally     Number of children: 3   Occupational History    Occupation: unemployed   Tobacco Use    Smoking status: Every Day     Current packs/day: 1.00     Average packs/day: 1 pack/day for 19.0 years (19.0 ttl pk-yrs)     Types: Cigarettes    Smokeless tobacco: Never   Substance and Sexual Activity    Alcohol use: Yes     Alcohol/week: 14.0 standard drinks of alcohol     Types: 14 Cans of beer per week     Comment: 2 beers per night    Drug use: Yes     Types: Marijuana    Sexual activity: Yes     Partners: Female     Social Determinants of Health     Stress: Stress Concern Present (11/22/2019)    [facility] [employer] - Occupational Stress Questionnaire     Feeling of Stress : Very much       Hospitalization/Major Diagnostic Procedure:     Review of Systems     General/Constitutional:  Chills denies. Fatigue denies. Fever denies. Weight gain denies. Weight loss denies.    Respiratory:  Shortness of breath denies.    Cardiovascular:  Chest pain denies.    Gastrointestinal:  Constipation denies. Diarrhea denies. Nausea denies. Vomiting denies.     Hematology:  Easy bruising denies. Prolonged bleeding denies.     Genitourinary:  Frequent urination denies. Pain in lower back denies. Painful urination denies.     Musculoskeletal:  See HPI for details    Skin:  Rash denies.    Neurologic:  Dizziness denies. Gait abnormalities denies. Seizures denies. Tingling/Numbess denies.    Psychiatric:  Anxiety denies. Depressed mood denies.     Objective:   Vital Signs: There were no vitals filed  for this visit.     Physical Exam      General Examination:     Constitutional: The patient is alert and oriented to lace person and time. Mood is pleasant.     Head/Face: Normal facial features normal eyebrows    Eyes: Normal extraocular motion bilaterally    Lungs: Respirations are equal and unlabored    Gait is coordinated.    Cardiovascular: There are no swelling or varicosities present.    Lymphatic: Negative for adenopathy    Skin: Normal    Neurological: Level of consciousness normal. Oriented to place person and time and situation    Psychiatric: Oriented to time place person and situation    Well-healed cervical and lumbar incisions cervical range of motion moderate restricted Spurling's maneuver weakly positive on the right lumbar exam pain with standing erect healed lumbar incision bilateral paraspinous spasm range of motion limited straight leg raising weakly positive right side motor exam shows grade 4 5 weakness of both ankle dorsiflexors.  Motor exam upper extremities mild weakness right biceps.  Trent test negative no clonus mild dependent edema both ankles  XRAY Report/ Interpretation:  Cervical and lumbar x-rays performed March 2023 were personally reviewed there is a solid cervical fusion C5-C7 with slight anterior subluxation of C4 on C5 AP lateral x-rays of lumbar spine were taken today show a solid fusion at L4-5 and L5-S1.  Previous hardware has been removed      Assessment:       1. Bilateral leg weakness    2. Cervical radiculitis    3. History of fusion of cervical spine    4. History of lumbar spinal fusion    5. Disc degeneration, lumbar    6. Lumbar radiculopathy        Plan:       Josh was seen today for pain and pain.    Diagnoses and all orders for this visit:    Bilateral leg weakness    Cervical radiculitis  -     X-Ray Cervical Spine AP And Lateral  -     MRI Cervical Spine Without Contrast; Future    History of fusion of cervical spine  -     X-Ray Cervical Spine AP And  Lateral  -     MRI Cervical Spine Without Contrast; Future    History of lumbar spinal fusion  -     X-Ray Lumbar Spine Ap And Lateral  -     X-Ray Pelvis Routine AP  -     MRI Lumbar Spine Without Contrast; Future    Disc degeneration, lumbar  -     X-Ray Lumbar Spine Ap And Lateral  -     X-Ray Pelvis Routine AP  -     MRI Lumbar Spine Without Contrast; Future    Lumbar radiculopathy  -     MRI Lumbar Spine Without Contrast; Future         Follow up for MRI Cervical Results, MRI Lumbar Results.    I am concerned that he may have adjacent segment stenosis in either the cervical or lumbar regions.  MRI study of the cervical spine and MRI lumbar spine have been recommended.  He is allergic to contrast and therefore MRI will be done without contrast  Treatment options were discussed with regards to the nature of the medical condition. Conservative pain intervention and surgical options were discussed in detail. The probability of success of each separate treatment option was discussed. The patient expressed a clear understanding of the treatment options. With regards to surgery, the procedure risk, benefits, complications, and outcomes were discussed. No guarantees were given with regards to surgical outcome.   The risk of complications, morbidity, and mortality of patient management decisions have been made at the time of this visit. These are associated with the patient's problems, diagnostic procedures and treatment options. This includes the possible management options selected and those considered but not selected by the patient after shared medical decision making we discussed with the patient.     This note was created using Dragon voice recognition software that occasionally misinterpreted phrases or words.

## 2023-08-08 ENCOUNTER — TELEPHONE (OUTPATIENT)
Dept: PODIATRY | Facility: CLINIC | Age: 53
End: 2023-08-08
Payer: COMMERCIAL

## 2023-08-08 NOTE — TELEPHONE ENCOUNTER
----- Message from Kirstin Noble sent at 8/8/2023  8:53 AM CDT -----  Regarding: Broken feet  Type:  Sooner Apoointment Request    Caller is requesting a sooner appointment.  Caller declined first available appointment listed below.  Caller will not accept being placed on the waitlist and is requesting a message be sent to doctor.    Name of Caller:Pt    When is the first available appointment?none    Symptoms:Broken feet    Would the patient rather a call back or a response via [employer]? Callback    Best Call Back Number:[phone] or [phone]    Additional Information: Pt sts he broke his feet while in the  [place] and from them they keep braking and he would like to see someone about this. Please advise  ---------------------- Thank you.

## 2023-08-21 ENCOUNTER — HOSPITAL ENCOUNTER (OUTPATIENT)
Dept: RADIOLOGY | Facility: HOSPITAL | Age: 53
Discharge: HOME | End: 2023-08-21
Attending: ORTHOPAEDIC SURGERY
Payer: COMMERCIAL

## 2023-08-21 DIAGNOSIS — M54.12 CERVICAL RADICULITIS: ICD-10-CM

## 2023-08-21 DIAGNOSIS — Z98.1 HISTORY OF FUSION OF CERVICAL SPINE: ICD-10-CM

## 2023-08-21 DIAGNOSIS — M54.16 LUMBAR RADICULOPATHY: ICD-10-CM

## 2023-08-21 DIAGNOSIS — Z98.1 HISTORY OF LUMBAR SPINAL FUSION: ICD-10-CM

## 2023-08-21 DIAGNOSIS — M51.36 DISC DEGENERATION, LUMBAR: ICD-10-CM

## 2023-08-21 PROCEDURE — 72148 MRI LUMBAR SPINE W/O DYE: CPT | Mod: TC

## 2023-08-21 PROCEDURE — 72148 MRI LUMBAR SPINE W/O DYE: CPT | Mod: 26,,, | Performed by: RADIOLOGY

## 2023-08-21 PROCEDURE — 72141 MRI CERVICAL SPINE WITHOUT CONTRAST: ICD-10-PCS | Mod: 26,,, | Performed by: RADIOLOGY

## 2023-08-21 PROCEDURE — 72141 MRI NECK SPINE W/O DYE: CPT | Mod: 26,,, | Performed by: RADIOLOGY

## 2023-08-21 PROCEDURE — 72141 MRI NECK SPINE W/O DYE: CPT | Mod: TC

## 2023-08-21 PROCEDURE — 72148 MRI LUMBAR SPINE WITHOUT CONTRAST: ICD-10-PCS | Mod: 26,,, | Performed by: RADIOLOGY

## 2023-10-11 ENCOUNTER — OFFICE VISIT (OUTPATIENT)
Dept: ORTHOPEDICS | Facility: CLINIC | Age: 53
End: 2023-10-11
Payer: COMMERCIAL

## 2023-10-11 VITALS — WEIGHT: 165 LBS | HEIGHT: 76 IN | BODY MASS INDEX: 20.09 KG/M2

## 2023-10-11 DIAGNOSIS — M40.292 OTHER KYPHOSIS OF CERVICAL REGION: ICD-10-CM

## 2023-10-11 DIAGNOSIS — M48.02 CERVICAL SPINAL STENOSIS DUE TO ADJACENT SEGMENT DISEASE AFTER FUSION PROCEDURE: Primary | ICD-10-CM

## 2023-10-11 DIAGNOSIS — M51.36 DISC DEGENERATION, LUMBAR: ICD-10-CM

## 2023-10-11 DIAGNOSIS — M50.30 CERVICAL SPINAL STENOSIS DUE TO ADJACENT SEGMENT DISEASE AFTER FUSION PROCEDURE: Primary | ICD-10-CM

## 2023-10-11 DIAGNOSIS — I87.2 VENOUS STASIS DERMATITIS OF BOTH LOWER EXTREMITIES: ICD-10-CM

## 2023-10-11 DIAGNOSIS — Z98.1 HISTORY OF FUSION OF CERVICAL SPINE: ICD-10-CM

## 2023-10-11 DIAGNOSIS — Z98.1 S/P CERVICAL SPINAL FUSION: ICD-10-CM

## 2023-10-11 PROCEDURE — 1159F PR MEDICATION LIST DOCUMENTED IN MEDICAL RECORD: ICD-10-PCS | Mod: CPTII,S$GLB,, | Performed by: ORTHOPAEDIC SURGERY

## 2023-10-11 PROCEDURE — 1159F MED LIST DOCD IN RCRD: CPT | Mod: CPTII,S$GLB,, | Performed by: ORTHOPAEDIC SURGERY

## 2023-10-11 PROCEDURE — 3008F PR BODY MASS INDEX (BMI) DOCUMENTED: ICD-10-PCS | Mod: CPTII,S$GLB,, | Performed by: ORTHOPAEDIC SURGERY

## 2023-10-11 PROCEDURE — 1160F RVW MEDS BY RX/DR IN RCRD: CPT | Mod: CPTII,S$GLB,, | Performed by: ORTHOPAEDIC SURGERY

## 2023-10-11 PROCEDURE — 1160F PR REVIEW ALL MEDS BY PRESCRIBER/CLIN PHARMACIST DOCUMENTED: ICD-10-PCS | Mod: CPTII,S$GLB,, | Performed by: ORTHOPAEDIC SURGERY

## 2023-10-11 PROCEDURE — 99213 OFFICE O/P EST LOW 20 MIN: CPT | Mod: S$GLB,,, | Performed by: ORTHOPAEDIC SURGERY

## 2023-10-11 PROCEDURE — 3008F BODY MASS INDEX DOCD: CPT | Mod: CPTII,S$GLB,, | Performed by: ORTHOPAEDIC SURGERY

## 2023-10-11 PROCEDURE — 99213 PR OFFICE/OUTPT VISIT, EST, LEVL III, 20-29 MIN: ICD-10-PCS | Mod: S$GLB,,, | Performed by: ORTHOPAEDIC SURGERY

## 2023-10-11 RX ORDER — HYDROCODONE BITARTRATE AND ACETAMINOPHEN 10; 325 MG/1; MG/1
1 TABLET ORAL EVERY 8 HOURS PRN
COMMUNITY
Start: 2023-10-05

## 2023-10-11 NOTE — PROGRESS NOTES
Subjective:       Patient ID: Josh Coulter is a 53 y.o. male.    Chief Complaint: Pain of the Neck (Neck pain is same, no arm pain, spasms to neck, no numbness to arms)      History of Present Illness    Prior to meeting with the patient I reviewed the medical chart in [facility]. This included reviewing the previous progress notes from our office, review of the patient's last appointment with their primary care provider, review of any visits to the emergency room, and review of any pain management appointments or procedures.    Previous history of cervical fusion and lumbar fusion progressively increasing complaints of neck pain radiating into his arms and back pain into his legs no bowel or bladder dysfunction here to  discuss results of MRI studies.  Patient has been having some problems with swelling and discoloration in both lower extremities.  He saw a vascular surgery physician who told him that he had cellulitis in his legs and placed him on antibiotics    Following his last visit we made the following disposition:    I advised the patient to complete his treatment with vascular surgeon and resolve the cellulitis in his lower extremities he is a candidate to have adjacent segment fusion at C4-5 obvious kyphotic deformity.  I think his medical issues though need to be stabilized.  Regards to the lumbar spine he probably has some adjacent segment stenosis above his previous fusion but his neck symptoms are more severe.     Should we perform surgery on the cervical spine I were removed the anterior plate from C5-C7 and checked the C5-6 level just to make sure that it is absolutely solid.  There has been some slight subsidence of the interbody device though I think it is stable but if we going to do an adjacent level at C4-5 I think we are explored at fusion at C5-6 otherwise there is no harm in leaving it as is    Patient feels that neck pain is tolerable is improved since last visit he is presently undergoing  treatment with wound care and the vascular surgeon for an also on his right lower leg  Current Medications  Current Outpatient Medications   Medication Sig Dispense Refill    cetirizine (ZYRTEC) 10 MG tablet TAKE ONE TABLET (10MG) BY MOUTH EVERY EVENING 30 tablet 3    cyclobenzaprine (FLEXERIL) 10 MG tablet Take 1 tablet (10 mg total) by mouth 3 (three) times daily. 90 tablet 2    HYDROcodone-acetaminophen (NORCO)  mg per tablet Take by mouth.      meloxicam (MOBIC) 15 MG tablet TAKE 1 TABLET (15MG) BY MOUTH ONCE DAILY 30 tablet 2    pregabalin (LYRICA) 50 MG capsule       tamsulosin (FLOMAX) 0.4 mg Cap TAKE TWO CAPSULES (0.8 MG TOTAL) BY MOUTH ONCE DAILY 60 capsule 3    HYDROcodone-acetaminophen (NORCO) 5-325 mg per tablet TAKE 1 TABLET BY MOUTH EVERY 12 HOURS AS NEEDED FOR PAIN (Patient not taking: Reported on 10/11/2023) 14 tablet 0     No current facility-administered medications for this visit.       Allergies  Review of patient's allergies indicates:   Allergen Reactions    Lyrica [pregabalin] Other (See Comments)     Patient complains of suicidal ideation with this medication    Contrast media Other (See Comments)     syncopy       Past Medical History  Past Medical History:   Diagnosis Date    Arthritis     Digestive disorder     Hyponatremia     Syncope     Wears glasses        Surgical History  Past Surgical History:   Procedure Laterality Date    ANTERIOR CERVICAL DISCECTOMY W/ FUSION N/A 2/24/2022    Procedure: DISCECTOMY, SPINE, CERVICAL, ANTERIOR APPROACH, WITH FUSION;  Surgeon: Cholo Mena MD;  Location: [facility];  Service: Orthopedics;  Laterality: N/A;  IO, [employer]- Avi notified on 2/23, [facility] attachment head piece, head halter chin strap, posterior cervical support, and christophe posterior cervical spine support    ANTERIOR LUMBAR INTERBODY FUSION (ALIF) N/A 3/31/2020    Procedure: FUSION, SPINE, LUMBAR, ALIF L4-5;  Surgeon: Cholo Mena MD;  Location: [facility]  OR;  Service: Orthopedics;  Laterality: N/A;    BONE GRAFT N/A 3/31/2020    Procedure: BONE GRAFT;  Surgeon: Cholo Mena MD;  Location: [facility] OR;  Service: Orthopedics;  Laterality: N/A;    BONE GRAFT N/A 2/24/2022    Procedure: BONE GRAFT;  Surgeon: Cholo Mena MD;  Location: [facility] OR;  Service: Orthopedics;  Laterality: N/A;    FUSION OF SPINE WITH INSTRUMENTATION N/A 3/31/2020    Procedure: FUSION, SPINE, WITH INSTRUMENTATION L4-5;  Surgeon: Cholo Mena MD;  Location: [facility] OR;  Service: Orthopedics;  Laterality: N/A;  NTI, [employer], DR DAMON, CO-SURGEON    FUSION OF SPINE WITH INSTRUMENTATION N/A 2/24/2022    Procedure: FUSION, SPINE, WITH INSTRUMENTATION;  Surgeon: Cholo Mena MD;  Location: [facility] OR;  Service: Orthopedics;  Laterality: N/A;    HERNIA REPAIR      REMOVAL OF HARDWARE FROM SPINE N/A 2/11/2021    Procedure: REMOVAL, HARDWARE, SPINE L4-5;  Surgeon: Cholo Mena MD;  Location: [facility] OR;  Service: Orthopedics;  Laterality: N/A;  [employer]    SPINE SURGERY  2009       Family History:   Family History   Problem Relation Age of Onset    Hyperlipidemia Mother     Hypertension Mother     Hyperlipidemia Father     Hypertension Father        Social History:   Social History     Socioeconomic History    Marital status: Legally     Number of children: 3   Occupational History    Occupation: unemployed   Tobacco Use    Smoking status: Every Day     Current packs/day: 1.00     Average packs/day: 1 pack/day for 19.0 years (19.0 ttl pk-yrs)     Types: Cigarettes    Smokeless tobacco: Never   Substance and Sexual Activity    Alcohol use: Yes     Alcohol/week: 14.0 standard drinks of alcohol     Types: 14 Cans of beer per week     Comment: 2 beers per night    Drug use: Yes     Types: Marijuana    Sexual activity: Yes     Partners: Female     Social Determinants of Health     Stress: Stress Concern Present (11/22/2019)    [employer] - Occupational Stress  Questionnaire     Feeling of Stress : Very much       Hospitalization/Major Diagnostic Procedure:     Review of Systems     General/Constitutional:  Chills denies. Fatigue denies. Fever denies. Weight gain denies. Weight loss denies.    Respiratory:  Shortness of breath denies.    Cardiovascular:  Chest pain denies.    Gastrointestinal:  Constipation denies. Diarrhea denies. Nausea denies. Vomiting denies.     Hematology:  Easy bruising denies. Prolonged bleeding denies.     Genitourinary:  Frequent urination denies. Pain in lower back denies. Painful urination denies.     Musculoskeletal:  See HPI for details    Skin:  Rash denies.    Neurologic:  Dizziness denies. Gait abnormalities denies. Seizures denies. Tingling/Numbess denies.    Psychiatric:  Anxiety denies. Depressed mood denies.     Objective:   Vital Signs: There were no vitals filed for this visit.     Physical Exam      General Examination:     Constitutional: The patient is alert and oriented to lace person and time. Mood is pleasant.     Head/Face: Normal facial features normal eyebrows    Eyes: Normal extraocular motion bilaterally    Lungs: Respirations are equal and unlabored    Gait is coordinated.    Cardiovascular: There are no swelling or varicosities present.    Lymphatic: Negative for adenopathy    Skin: Normal    Neurological: Level of consciousness normal. Oriented to place person and time and situation    Psychiatric: Oriented to time place person and situation    Cervical range of motion mildly restricted there is no spasm tenderness without negative Spurling maneuver.  XRAY Report/ Interpretation:  No new x-rays today      Assessment:       1. Cervical spinal stenosis due to adjacent segment disease after fusion procedure    2. History of fusion of cervical spine    3. Disc degeneration, lumbar    4. Other kyphosis of cervical region    5. Venous stasis dermatitis of both lower extremities    6. S/P cervical spinal fusion        Plan:        Josh was seen today for pain.    Diagnoses and all orders for this visit:    Cervical spinal stenosis due to adjacent segment disease after fusion procedure    History of fusion of cervical spine    Disc degeneration, lumbar    Other kyphosis of cervical region    Venous stasis dermatitis of both lower extremities    S/P cervical spinal fusion         No follow-ups on file.    Patient would prefer to be treated conservatively and and managed by his pain management physician.  We will monitor him periodically thus far he does not have any intention wanting any more surgery to his neck or lower back.  Treatment options were discussed with regards to the nature of the medical condition. Conservative pain intervention and surgical options were discussed in detail. The probability of success of each separate treatment option was discussed. The patient expressed a clear understanding of the treatment options. With regards to surgery, the procedure risk, benefits, complications, and outcomes were discussed. No guarantees were given with regards to surgical outcome.   The risk of complications, morbidity, and mortality of patient management decisions have been made at the time of this visit. These are associated with the patient's problems, diagnostic procedures and treatment options. This includes the possible management options selected and those considered but not selected by the patient after shared medical decision making we discussed with the patient.     This note was created using Dragon voice recognition software that occasionally misinterpreted phrases or words.

## 2023-10-17 NOTE — ED PROVIDER NOTES
Encounter Date: 3/2/2023       History     Chief Complaint   Patient presents with    Fall     Pt fell yesterday c/o neck pain, back pain and L foot pain and swelling. Pt denies LOC, pt not on blood thinners          52-year-old male with a past medical history of arthritis presents with a chief complaint of left foot and ankle pain.  The patient reports that he accidentally had a 4 x 6 piece of wood dropped on his foot and ankle yesterday from a  height of approximately 3 ft.  The patient then fell backwards after this, injuring his back and neck.  The patient denies any head injury or loss of consciousness.  The patient reports his pain is worse with walking and moving.  There are no alleviating factors.  The patient reports associated swelling and bruising to the foot and ankle on the left side as well.    Review of patient's allergies indicates:   Allergen Reactions    Lyrica [pregabalin] Other (See Comments)     Patient complains of suicidal ideation with this medication    Contrast media Other (See Comments)     syncopy     Past Medical History:   Diagnosis Date    Arthritis     Digestive disorder     Hyponatremia     Syncope     Wears glasses      Past Surgical History:   Procedure Laterality Date    ANTERIOR CERVICAL DISCECTOMY W/ FUSION N/A 2/24/2022    Procedure: DISCECTOMY, SPINE, CERVICAL, ANTERIOR APPROACH, WITH FUSION;  Surgeon: Cholo Mena MD;  Location: [facility] OR;  Service: Orthopedics;  Laterality: N/A;  IO, [employer]- Avi notified on 2/23, [facility] attachment head piece, head halter chin strap, posterior cervical support, and christophe posterior cervical spine support    ANTERIOR LUMBAR INTERBODY FUSION (ALIF) N/A 3/31/2020    Procedure: FUSION, SPINE, LUMBAR, ALIF L4-5;  Surgeon: Cholo Mena MD;  Location: [facility] OR;  Service: Orthopedics;  Laterality: N/A;    BONE GRAFT N/A 3/31/2020    Procedure: BONE GRAFT;  Surgeon: Cholo Mena MD;  Location: [facility] OR;  Service:  Orthopedics;  Laterality: N/A;    BONE GRAFT N/A 2/24/2022    Procedure: BONE GRAFT;  Surgeon: Cholo Mena MD;  Location: [facility] OR;  Service: Orthopedics;  Laterality: N/A;    FUSION OF SPINE WITH INSTRUMENTATION N/A 3/31/2020    Procedure: FUSION, SPINE, WITH INSTRUMENTATION L4-5;  Surgeon: Cholo Mena MD;  Location: [facility] OR;  Service: Orthopedics;  Laterality: N/A;  NTI, [employer], DR DAMON, CO-SURGEON    FUSION OF SPINE WITH INSTRUMENTATION N/A 2/24/2022    Procedure: FUSION, SPINE, WITH INSTRUMENTATION;  Surgeon: Cholo Mena MD;  Location: [facility] OR;  Service: Orthopedics;  Laterality: N/A;    HERNIA REPAIR      REMOVAL OF HARDWARE FROM SPINE N/A 2/11/2021    Procedure: REMOVAL, HARDWARE, SPINE L4-5;  Surgeon: Cholo Mena MD;  Location: [facility];  Service: Orthopedics;  Laterality: N/A;  [employer]    SPINE SURGERY  2009     Family History   Problem Relation Age of Onset    Hyperlipidemia Mother     Hypertension Mother     Hyperlipidemia Father     Hypertension Father      Social History     Tobacco Use    Smoking status: Every Day     Packs/day: 1.00     Years: 19.00     Pack years: 19.00     Types: Cigarettes    Smokeless tobacco: Never   Substance Use Topics    Alcohol use: Yes     Alcohol/week: 14.0 standard drinks     Types: 14 Cans of beer per week     Comment: 2 beers per night    Drug use: Yes     Types: Marijuana     Review of Systems   Constitutional:  Negative for chills and fever.   HENT:  Negative for congestion.    Respiratory:  Negative for cough and shortness of breath.    Cardiovascular:  Negative for chest pain.   Gastrointestinal:  Negative for abdominal pain, nausea and vomiting.   Genitourinary:  Negative for dysuria and testicular pain.   Musculoskeletal:  Positive for arthralgias and joint swelling. Negative for back pain.   Skin:  Negative for color change.     Physical Exam     Initial Vitals [03/02/23 1648]   BP Pulse Resp Temp SpO2   129/63 107 18 98.3 °F (36.8 °C) 98  %      MAP       --         Physical Exam    Nursing note and vitals reviewed.  Constitutional: He appears well-developed and well-nourished.   HENT:   Head: Normocephalic and atraumatic.   Eyes: EOM are normal. Pupils are equal, round, and reactive to light.   Neck: Neck supple.   Cardiovascular:  Normal rate and regular rhythm.           Pulmonary/Chest: Breath sounds normal.   Abdominal: Abdomen is soft. Bowel sounds are normal. He exhibits no distension. There is no abdominal tenderness. There is no rebound and no guarding.   Musculoskeletal:         General: Normal range of motion.      Cervical back: Neck supple.      Comments: No tenderness or spinal step-offs noted to the cervical, thoracic, or lumbar regions.  Left foot and ankle with ecchymosis, edema, and tenderness noted.     Neurological: He is alert and oriented to person, place, and time.   Skin: Skin is warm and dry.   Psychiatric: He has a normal mood and affect.       ED Course   Procedures  Labs Reviewed - No data to display       Imaging Results              X-Ray Ankle Complete Left (Final result)  Result time 03/02/23 21:06:29      Final result by Segun Mojica MD (03/02/23 21:06:29)                   Impression:      The findings evident within the left ankle or foot.      Electronically signed by: Segun Mojica  Date:    03/02/2023  Time:    21:06               Narrative:    EXAMINATION:  XR ANKLE COMPLETE 3 VIEW LEFT; XR FOOT COMPLETE 3 VIEW LEFT    CLINICAL HISTORY:  Unspecified injury of unspecified ankle, initial encounter; Unspecified injury of unspecified foot, initial encounter    TECHNIQUE:  AP, lateral and oblique views of the left ankle were performed.    COMPARISON:  None    FINDINGS:  Bones joint spaces and soft tissues appear intact without fracture dislocation or evidence of effusion.    Atypical tarsal metatarsal joints with ankylosis is suggested especially at the 1st cuneiform and 1st metatarsal likely developmental.   No fracture or foreign body is evident.                                       X-Ray Foot Complete Left (Final result)  Result time 03/02/23 21:06:29      Final result by Segun Mojica MD (03/02/23 21:06:29)                   Impression:      The findings evident within the left ankle or foot.      Electronically signed by: Segun Mojica  Date:    03/02/2023  Time:    21:06               Narrative:    EXAMINATION:  XR ANKLE COMPLETE 3 VIEW LEFT; XR FOOT COMPLETE 3 VIEW LEFT    CLINICAL HISTORY:  Unspecified injury of unspecified ankle, initial encounter; Unspecified injury of unspecified foot, initial encounter    TECHNIQUE:  AP, lateral and oblique views of the left ankle were performed.    COMPARISON:  None    FINDINGS:  Bones joint spaces and soft tissues appear intact without fracture dislocation or evidence of effusion.    Atypical tarsal metatarsal joints with ankylosis is suggested especially at the 1st cuneiform and 1st metatarsal likely developmental.  No fracture or foreign body is evident.                                       X-Ray Lumbar Spine Ap And Lateral (Final result)  Result time 03/02/23 21:09:28      Final result by Segun Mojica MD (03/02/23 21:09:28)                   Impression:      Mild thoracic spondylosis with no acute finding.    Previous lumbar fusion changes at L4-5 and L5-S1 with posterior decompression and no acute findings in the adjacent levels.      Electronically signed by: Segun Mojica  Date:    03/02/2023  Time:    21:09               Narrative:    EXAMINATION:  XR LUMBAR SPINE AP AND LATERAL; XR THORACIC SPINE AP LATERAL    CLINICAL HISTORY:  fall;Unspecified fall, initial encounter    TECHNIQUE:  AP, lateral and spot images were performed of the thoracic and lumbar spine.    COMPARISON:  None    FINDINGS:  Mild spondylosis in the thoracic spine.  No acute fracture or pedicle destruction.  Discs are maintained.    In the lumbar spine there fusion changes with  intervertebral spacers at L4-5 and L5-S1 with bony bridging between the endplates.  No acute fracture is evident.  Anterolisthesis of L4 on L5 is noted.  Facet arthropathy at L4-5 and L5-S1 with posterior decompression changes.  The other lumbar vertebral bodies demonstrate mild spondylosis especially at the adjacent non fused L3-4.  No fracture or pedicle destruction.  SI joints appear intact.                                       X-Ray Thoracic Spine AP Lateral (Final result)  Result time 03/02/23 21:09:28      Final result by Segun Mojica MD (03/02/23 21:09:28)                   Impression:      Mild thoracic spondylosis with no acute finding.    Previous lumbar fusion changes at L4-5 and L5-S1 with posterior decompression and no acute findings in the adjacent levels.      Electronically signed by: Segun Mojica  Date:    03/02/2023  Time:    21:09               Narrative:    EXAMINATION:  XR LUMBAR SPINE AP AND LATERAL; XR THORACIC SPINE AP LATERAL    CLINICAL HISTORY:  fall;Unspecified fall, initial encounter    TECHNIQUE:  AP, lateral and spot images were performed of the thoracic and lumbar spine.    COMPARISON:  None    FINDINGS:  Mild spondylosis in the thoracic spine.  No acute fracture or pedicle destruction.  Discs are maintained.    In the lumbar spine there fusion changes with intervertebral spacers at L4-5 and L5-S1 with bony bridging between the endplates.  No acute fracture is evident.  Anterolisthesis of L4 on L5 is noted.  Facet arthropathy at L4-5 and L5-S1 with posterior decompression changes.  The other lumbar vertebral bodies demonstrate mild spondylosis especially at the adjacent non fused L3-4.  No fracture or pedicle destruction.  SI joints appear intact.                                       X-Ray Cervical Spine AP And Lateral (Final result)  Result time 03/02/23 21:49:34      Final result by Segun Mojica MD (03/02/23 21:49:34)                   Impression:      ACDF C5-6-7  with no acute findings here or elsewhere in the cervical spine.      Electronically signed by: Segun Mojica  Date:    03/02/2023  Time:    21:49               Narrative:    EXAMINATION:  XR CERVICAL SPINE AP LATERAL    CLINICAL HISTORY:  Unspecified fall, initial encounter    TECHNIQUE:  AP, lateral and open mouth views of the cervical spine were performed.    COMPARISON:  None.    FINDINGS:  Anterior cervical fusion at C5-6 and 7 with plate screws and spacers appear intact.  Physiologic type anterolisthesis of C4 on C5 is noted.  There is no evidence of fracture or subluxation.  No prevertebral soft tissue swelling or posterior elements distraction is evident.  Anterior soft tissues appear unremarkable.  The cervicothoracic and cervical cranial junctions are maintained.  The upper chest appears unremarkable.                                       Medications   ketorolac injection 15 mg (15 mg Intramuscular Given 3/2/23 2045)     Medical Decision Making:   Initial Assessment:   52-year-old male presented for evaluation of multiple injuries.  Differential Diagnosis:   Initial differential diagnosis included but not limited to fracture, contusion, and dislocation.  Clinical Tests:   Radiological Study: Ordered and Reviewed  ED Management:  The patient was emergently evaluated in the emergency department, his evaluation was significant for middle-age male with swelling, tenderness, and ecchymosis noted to the foot.  The patient's x-ray showed no acute bony abnormalities.  The patient likely has a foot contusion.  The patient had an Ace wrap placed for support and comfort here in the emergency department by the nursing staff.  He was also treated with a dose of parental Toradol for pain relief.  He is stable for discharge to home.  He can take over-the-counter pain medication as needed for pain relief.  He is referred to primary care for follow-up.                        Clinical Impression:   Final  diagnoses:  [S99.929A] Foot injury  [W19.XXXA] Fall  [S99.919A] Ankle injury  [S90.32XA] Contusion of left foot, initial encounter (Primary)        ED Disposition Condition    Discharge Stable          ED Prescriptions    None       Follow-up Information       Follow up With Specialties Details Why Contact Info    [facility] Schedule an appointment as soon as possible for a visit   [address]  [address]  [place] [address]  [phone]               Octavio Winn MD  03/02/23 2789     Enbrel Counseling:  I discussed with the patient the risks of etanercept including but not limited to myelosuppression, immunosuppression, autoimmune hepatitis, demyelinating diseases, lymphoma, and infections.  The patient understands that monitoring is required including a PPD at baseline and must alert us or the primary physician if symptoms of infection or other concerning signs are noted.

## 2024-02-22 ENCOUNTER — TELEPHONE (OUTPATIENT)
Dept: ORTHOPEDICS | Facility: CLINIC | Age: 54
End: 2024-02-22

## 2024-02-27 ENCOUNTER — TELEPHONE (OUTPATIENT)
Dept: ORTHOPEDICS | Facility: CLINIC | Age: 54
End: 2024-02-27

## 2024-02-27 NOTE — TELEPHONE ENCOUNTER
Unable to schedule appointment as he has insurance that we no longer accept. Message given to Dr. Mena

## 2025-06-10 DIAGNOSIS — Z01.818 PRE-OP EVALUATION: Primary | ICD-10-CM

## (undated) DEVICE — COVER SURG LIGHT HANDLE

## (undated) DEVICE — PAD BOVIE ADULT

## (undated) DEVICE — SEE MEDLINE ITEM 157131

## (undated) DEVICE — SEE MEDLINE ITEM 146292

## (undated) DEVICE — SUTURE VICRYL 0 CT-1 27 VCP260H

## (undated) DEVICE — SET AT1 AUTOTRANSFUSION

## (undated) DEVICE — SYRINGE 0.9% NACL 10MIL PREFIL

## (undated) DEVICE — PROBE 100MM PEDICLE SCREW

## (undated) DEVICE — DRAPE 3/4

## (undated) DEVICE — NEEDLE SPINAL 18GA 3 1/2 333350

## (undated) DEVICE — SUT SILK BLK. BR. 3-0 10

## (undated) DEVICE — CONTAINER SPECIMEN STRL 4OZ

## (undated) DEVICE — NDL SPINAL 18GX3.5 SPINOCAN

## (undated) DEVICE — Device

## (undated) DEVICE — MARKER SKIN W/ RULER 77734

## (undated) DEVICE — DRESSING N ADH OIL EMUL 3X8 3S

## (undated) DEVICE — POUCH INSTRUMENT 1018

## (undated) DEVICE — BLADE SCALPEL #10 371110

## (undated) DEVICE — TUBING ANTICOAGULANT

## (undated) DEVICE — SPONGE LAP 18X18

## (undated) DEVICE — UNDERGLOVES BIOGEL PI SIZE 8

## (undated) DEVICE — SPONGE SURGIFOAM 8X12.5 1974

## (undated) DEVICE — TUBE SUCTION YANKAUER HI CAP

## (undated) DEVICE — SLEEVE SCD EXPRESS KNEE MEDIUM

## (undated) DEVICE — BULB DRAIN 100CC 70740

## (undated) DEVICE — GOWN B1 X-LG X-LONG

## (undated) DEVICE — SUT CTD VICRYL CT-1 27

## (undated) DEVICE — PROTECTOR ULNAR NERVE PURPLE FOAM HOOK LOOP STRAP ANATOMICAL

## (undated) DEVICE — SPONGE LAP 4X18 PREWASHED

## (undated) DEVICE — GOWN X-LARGE 044674

## (undated) DEVICE — CORD BIPOLAR 12 FOOT

## (undated) DEVICE — DRAPE STERI-DRAPE 1000 17X11IN

## (undated) DEVICE — GAUZE SPONGE PEANUT STRL

## (undated) DEVICE — CRADLE LAMINECTOMY ARM

## (undated) DEVICE — DRAPE C-ARMOR EQUIPMENT COVER

## (undated) DEVICE — SUT CTD VICRYL 2-0 UND BR O

## (undated) DEVICE — SOL 9P NACL IRR PIC IL

## (undated) DEVICE — FORCEP BAYO INSU SGL USE STRGT

## (undated) DEVICE — SUTURE ETHILON 2-0 PS 18 585H

## (undated) DEVICE — UNDERGLOVES BIOGEL PI SIZE 8.5

## (undated) DEVICE — DRAPE IOBAN 23X17 6650EZ

## (undated) DEVICE — DRAPE C ARM 42 X 120 10/BX

## (undated) DEVICE — ELECTRODE REM PLYHSV RETURN 9

## (undated) DEVICE — DRAPE STERI INSTRUMENT 1018

## (undated) DEVICE — STAPLER SKIN ROTATING HEAD

## (undated) DEVICE — TAPE CLOTH SOFT MEDIPORE 4IN

## (undated) DEVICE — PAD ABD 8X10 STERILE

## (undated) DEVICE — DRAPE O-ARM STERILE

## (undated) DEVICE — LINER SUCTION 3000CC

## (undated) DEVICE — SPONGE KERLIX SUPER   NON25854

## (undated) DEVICE — DERMABOND HVD MINI  DHVM12

## (undated) DEVICE — SPHERE NDI PASSIVE

## (undated) DEVICE — NDL BOX COUNTER

## (undated) DEVICE — DRAPE INCISE IOBAN 2 23X17IN

## (undated) DEVICE — TAPE MEDIPORE 3 X 10YD

## (undated) DEVICE — SLEEVE SCD EXPRESS CALF MEDIUM

## (undated) DEVICE — SYS LABEL CORRECT MED

## (undated) DEVICE — DRAPE LAP TIBURON 77X122IN

## (undated) DEVICE — FIXATION PIN

## (undated) DEVICE — SPONGE GAUZE 10S 4X4  442214

## (undated) DEVICE — SUT CTD VICRYL 1 UND OS-8

## (undated) DEVICE — APPLICATOR CHLORAPREP ORN 26ML

## (undated) DEVICE — APPLIER LIGACLIP SM 9.38IN

## (undated) DEVICE — PAD OR EGGCRATE BLUE 2X20X72

## (undated) DEVICE — PACK CUSTOM LUMBAR LAM SLI

## (undated) DEVICE — SUTURE VICRYL 2-0 27 SH VCP417H

## (undated) DEVICE — STRAP OR TABLE 5IN X 72IN

## (undated) DEVICE — GLOVE SURG ULTRA TOUCH 8

## (undated) DEVICE — CLIPPER BLADE MOD 4406 (CAREF)

## (undated) DEVICE — SOLUTION IRRI NS BOTTLE 1000ML R5200-01

## (undated) DEVICE — DRAPE CLEAR SMALL 1000

## (undated) DEVICE — SUT CTD VICRYL 1 VIL BR CR/

## (undated) DEVICE — SEE MEDLINE ITEM 157117

## (undated) DEVICE — PREP CHLORA 26ML

## (undated) DEVICE — DRAIN FLAT SILICONE 7MM 70360

## (undated) DEVICE — TOWEL OR BLUE      MDT2168284

## (undated) DEVICE — SOL NS 1000CC

## (undated) DEVICE — PACK BASIC

## (undated) DEVICE — DRESSING ADAPTIC N ADH 3X8IN

## (undated) DEVICE — ELECTRODE BLD EXT 6.50 ST DISP

## (undated) DEVICE — TAPE CLOTH 4 MEDIPORE 2864

## (undated) DEVICE — SPONGE SUPER KERLIX 6X6.75IN

## (undated) DEVICE — BLADE SURG CARBON STEEL #10

## (undated) DEVICE — TAPE MEDIPORE 4IN X 2YDS

## (undated) DEVICE — GLOVE SURG ULTRA TOUCH 7.5

## (undated) DEVICE — GLOVE BIOGEL PI ULTRA TOUCH GRAY SZ 8.5

## (undated) DEVICE — RESERVOIR (FOR C A T S)

## (undated) DEVICE — ALCOHOL 70% ISOP RUBBING 4OZ

## (undated) DEVICE — STAPLER SKIN ROTATING HEAD WIDE PRW35

## (undated) DEVICE — DRESSING ADAPTIC 3X8 2015

## (undated) DEVICE — DRAIN EVACUATOR 400CC 1/4IN

## (undated) DEVICE — SEE MEDLINE ITEM 107746

## (undated) DEVICE — PACK CUSTOM UNIV BASIN SLI

## (undated) DEVICE — SEE MEDLINE ITEM 152622

## (undated) DEVICE — SPONGE LAP 18X18 PREWASHED

## (undated) DEVICE — DRAPE C-ARM (FITS NEW C-ARM) 07-CA108

## (undated) DEVICE — TAPE SILK 3IN

## (undated) DEVICE — APPLIER LIGACLIP MED 11IN

## (undated) DEVICE — ALCOHOL 16OZ

## (undated) DEVICE — SUCTION YANKAUER BULB TIP W/O VENT

## (undated) DEVICE — TRAY ACF SLIDELL MEMORIAL HOSPITAL

## (undated) DEVICE — SUT 2-0 VICRYL / CT-1

## (undated) DEVICE — GLOVE BIOGEL PI ULTRA TOUCH GRAY SZ8.0